# Patient Record
Sex: FEMALE | Race: WHITE | NOT HISPANIC OR LATINO | Employment: UNEMPLOYED | URBAN - METROPOLITAN AREA
[De-identification: names, ages, dates, MRNs, and addresses within clinical notes are randomized per-mention and may not be internally consistent; named-entity substitution may affect disease eponyms.]

---

## 2018-01-10 ENCOUNTER — OFFICE VISIT CONVERTED (OUTPATIENT)
Dept: FAMILY MEDICINE CLINIC | Facility: CLINIC | Age: 33
End: 2018-01-10
Attending: NURSE PRACTITIONER

## 2018-05-09 ENCOUNTER — OFFICE VISIT CONVERTED (OUTPATIENT)
Dept: FAMILY MEDICINE CLINIC | Facility: CLINIC | Age: 33
End: 2018-05-09
Attending: NURSE PRACTITIONER

## 2018-06-05 ENCOUNTER — CONVERSION ENCOUNTER (OUTPATIENT)
Dept: FAMILY MEDICINE CLINIC | Facility: CLINIC | Age: 33
End: 2018-06-05

## 2018-06-05 ENCOUNTER — OFFICE VISIT CONVERTED (OUTPATIENT)
Dept: FAMILY MEDICINE CLINIC | Facility: CLINIC | Age: 33
End: 2018-06-05
Attending: NURSE PRACTITIONER

## 2018-08-23 ENCOUNTER — OFFICE VISIT CONVERTED (OUTPATIENT)
Dept: PODIATRY | Facility: CLINIC | Age: 33
End: 2018-08-23
Attending: PODIATRIST

## 2018-09-19 ENCOUNTER — OFFICE VISIT CONVERTED (OUTPATIENT)
Dept: PODIATRY | Facility: CLINIC | Age: 33
End: 2018-09-19
Attending: PODIATRIST

## 2018-12-04 ENCOUNTER — OFFICE VISIT CONVERTED (OUTPATIENT)
Dept: FAMILY MEDICINE CLINIC | Facility: CLINIC | Age: 33
End: 2018-12-04
Attending: NURSE PRACTITIONER

## 2018-12-21 ENCOUNTER — OFFICE VISIT CONVERTED (OUTPATIENT)
Dept: NEUROSURGERY | Facility: CLINIC | Age: 33
End: 2018-12-21
Attending: PHYSICIAN ASSISTANT

## 2019-01-07 ENCOUNTER — HOSPITAL ENCOUNTER (OUTPATIENT)
Dept: OTHER | Facility: HOSPITAL | Age: 34
Discharge: HOME | End: 2019-01-07
Attending: INTERNAL MEDICINE

## 2019-01-10 ENCOUNTER — OFFICE VISIT CONVERTED (OUTPATIENT)
Dept: FAMILY MEDICINE CLINIC | Facility: CLINIC | Age: 34
End: 2019-01-10
Attending: FAMILY MEDICINE

## 2019-01-12 LAB
CONV HLA-B27 GENOTYPING (PCR): NEGATIVE
CONV HLA-B27 PCR SPECIMEN: NORMAL

## 2019-01-29 ENCOUNTER — OFFICE VISIT CONVERTED (OUTPATIENT)
Dept: NEUROSURGERY | Facility: CLINIC | Age: 34
End: 2019-01-29
Attending: PHYSICIAN ASSISTANT

## 2019-07-16 ENCOUNTER — OFFICE VISIT CONVERTED (OUTPATIENT)
Dept: FAMILY MEDICINE CLINIC | Facility: CLINIC | Age: 34
End: 2019-07-16
Attending: NURSE PRACTITIONER

## 2019-08-09 ENCOUNTER — OFFICE VISIT CONVERTED (OUTPATIENT)
Dept: FAMILY MEDICINE CLINIC | Facility: CLINIC | Age: 34
End: 2019-08-09
Attending: NURSE PRACTITIONER

## 2019-09-13 ENCOUNTER — HOSPITAL ENCOUNTER (OUTPATIENT)
Dept: OTHER | Facility: HOSPITAL | Age: 34
Discharge: HOME | End: 2019-09-13
Attending: INTERNAL MEDICINE

## 2019-09-13 LAB
ALBUMIN SERPL-MCNC: 4.3 G/DL (ref 3.5–5)
ALBUMIN/GLOB SERPL: 1.4 {RATIO} (ref 1.4–2.6)
ALP SERPL-CCNC: 98 U/L (ref 42–98)
ALT SERPL-CCNC: 15 U/L (ref 10–40)
ANION GAP SERPL CALC-SCNC: 24 MMOL/L (ref 8–19)
AST SERPL-CCNC: 16 U/L (ref 15–50)
BASOPHILS # BLD AUTO: 0.06 10*3/UL (ref 0–0.2)
BASOPHILS NFR BLD AUTO: 0.7 % (ref 0–3)
BILIRUB SERPL-MCNC: 0.41 MG/DL (ref 0.2–1.3)
BUN SERPL-MCNC: 8 MG/DL (ref 5–25)
BUN/CREAT SERPL: 12 {RATIO} (ref 6–20)
CALCIUM SERPL-MCNC: 9.5 MG/DL (ref 8.7–10.4)
CHLORIDE SERPL-SCNC: 101 MMOL/L (ref 99–111)
CONV ABS IMM GRAN: 0.01 10*3/UL (ref 0–0.2)
CONV CO2: 19 MMOL/L (ref 22–32)
CONV IMMATURE GRAN: 0.1 % (ref 0–1.8)
CONV TOTAL PROTEIN: 7.3 G/DL (ref 6.3–8.2)
CREAT UR-MCNC: 0.69 MG/DL (ref 0.5–0.9)
DEPRECATED RDW RBC AUTO: 47.1 FL (ref 36.4–46.3)
EOSINOPHIL # BLD AUTO: 0.22 10*3/UL (ref 0–0.7)
EOSINOPHIL # BLD AUTO: 2.4 % (ref 0–7)
ERYTHROCYTE [DISTWIDTH] IN BLOOD BY AUTOMATED COUNT: 14.1 % (ref 11.7–14.4)
ERYTHROCYTE [SEDIMENTATION RATE] IN BLOOD: 13 MM/H (ref 0–20)
GFR SERPLBLD BASED ON 1.73 SQ M-ARVRAT: >60 ML/MIN/{1.73_M2}
GLOBULIN UR ELPH-MCNC: 3 G/DL (ref 2–3.5)
GLUCOSE SERPL-MCNC: 98 MG/DL (ref 65–99)
HCT VFR BLD AUTO: 45.3 % (ref 37–47)
HGB BLD-MCNC: 14.8 G/DL (ref 12–16)
LYMPHOCYTES # BLD AUTO: 2.56 10*3/UL (ref 1–5)
LYMPHOCYTES NFR BLD AUTO: 27.8 % (ref 20–45)
MCH RBC QN AUTO: 29.8 PG (ref 27–31)
MCHC RBC AUTO-ENTMCNC: 32.7 G/DL (ref 33–37)
MCV RBC AUTO: 91.3 FL (ref 81–99)
MONOCYTES # BLD AUTO: 0.7 10*3/UL (ref 0.2–1.2)
MONOCYTES NFR BLD AUTO: 7.6 % (ref 3–10)
NEUTROPHILS # BLD AUTO: 5.67 10*3/UL (ref 2–8)
NEUTROPHILS NFR BLD AUTO: 61.4 % (ref 30–85)
NRBC CBCN: 0 % (ref 0–0.7)
OSMOLALITY SERPL CALC.SUM OF ELEC: 288 MOSM/KG (ref 273–304)
PLATELET # BLD AUTO: 285 10*3/UL (ref 130–400)
PMV BLD AUTO: 10.9 FL (ref 9.4–12.3)
POTASSIUM SERPL-SCNC: 4 MMOL/L (ref 3.5–5.3)
RBC # BLD AUTO: 4.96 10*6/UL (ref 4.2–5.4)
SODIUM SERPL-SCNC: 140 MMOL/L (ref 135–147)
WBC # BLD AUTO: 9.22 10*3/UL (ref 4.8–10.8)

## 2019-11-27 ENCOUNTER — OFFICE VISIT CONVERTED (OUTPATIENT)
Dept: FAMILY MEDICINE CLINIC | Facility: CLINIC | Age: 34
End: 2019-11-27
Attending: NURSE PRACTITIONER

## 2020-02-17 ENCOUNTER — OFFICE VISIT CONVERTED (OUTPATIENT)
Dept: FAMILY MEDICINE CLINIC | Facility: CLINIC | Age: 35
End: 2020-02-17
Attending: NURSE PRACTITIONER

## 2020-03-13 ENCOUNTER — HOSPITAL ENCOUNTER (OUTPATIENT)
Dept: OTHER | Facility: HOSPITAL | Age: 35
Discharge: HOME | End: 2020-03-13

## 2020-11-25 ENCOUNTER — CONVERSION ENCOUNTER (OUTPATIENT)
Dept: FAMILY MEDICINE CLINIC | Facility: CLINIC | Age: 35
End: 2020-11-25

## 2020-11-25 ENCOUNTER — OFFICE VISIT CONVERTED (OUTPATIENT)
Dept: FAMILY MEDICINE CLINIC | Facility: CLINIC | Age: 35
End: 2020-11-25
Attending: NURSE PRACTITIONER

## 2020-11-25 ENCOUNTER — HOSPITAL ENCOUNTER (OUTPATIENT)
Dept: FAMILY MEDICINE CLINIC | Facility: CLINIC | Age: 35
Discharge: HOME | End: 2020-11-25
Attending: NURSE PRACTITIONER

## 2020-11-29 LAB — SARS-COV-2 RNA SPEC QL NAA+PROBE: NOT DETECTED

## 2021-03-02 ENCOUNTER — OFFICE VISIT CONVERTED (OUTPATIENT)
Dept: FAMILY MEDICINE CLINIC | Facility: CLINIC | Age: 36
End: 2021-03-02
Attending: NURSE PRACTITIONER

## 2021-05-07 NOTE — PROGRESS NOTES
Progress Note      Patient Name: Crystal Mast   Patient ID: [id number]   Sex: Female   YOB: 1985        Visit Date: May 9, 2018    Provider: PHYLLIS Rodrigez   Location: [facility]   Location Address: [address] [place] [address]  [id number]   Location Phone: [phone]          Chief Complaint     frequent headaches, pain all over and progressivly getting worse       History Of Present Illness  Crystal Mast is a 33 year old /White female who presents for evaluation and treatment of:      frequent headaches last week with neck pain, shoulder pain, knee pain - pt states she hurts daily and the more active she is the more she hurts - has been taking ibuprofen with mild relief of some of the pains -   Headaches for the last few months - pain is worse to the back of the head and neck and will use a heating pad and will prop her neck up.  Some days feels like she cannot move.   Pt states hx of 18 surgeries in the past 10 years.  Hx of chronic back pain.   Breast surgery 10 years ago.   Swelling of the right ankle.     Denies hx of autoimmune disorder    states she does not sleep well - tosses and turns frequently because she is so achy    Surgeon has started her on Meloxicam 7.5mg once daily - thinks rotator problem and is starting therapy soon         Past Medical History  Disease Name Date Onset Notes   **No Past Medical History --  --          Past Surgical History  Procedure Name Date Notes   Breast reconstruction with latissimus dorsi myocutaneous flap --  --    Breast Reduction --  --    Ceasarian section --  3 or more   Tubal ligation --  --          Medication List  Name Date Started Instructions   sucralfate 1 gram oral tablet  take tablet 4 times daily         Allergy List  Allergen Name Date Reaction Notes   NO KNOWN DRUG ALLERGIES --  --  --          Family Medical History  Disease Name Relative/Age Notes   *No Significant Family Medical History /  "--    Hypertension / --          Social History  Finding Status Start/Stop Quantity Notes   Alcohol Never --/-- --  never drinks alcohol   Exercises regularly --  --/-- --  occasionally   Recreational Drug Use Never --/-- --  never used   Second hand smoke exposure Unknown --/-- --  no   Tobacco Current every day --/-- 0.5 PPD current every day smoker, smokes less than 1 pack per day, for 15 years, never uses other tobacco products   Uses seatbelts --  --/-- --  yes         Review of Systems  · Constitutional  o Admits  o : headache, body aches  o Denies  o : fever, chills  · Cardiovascular  o Admits  o : chest discomfort - hx of breast surgeries and reduction and right breast infection - will flare with frequent use of the right arm  o Denies  o : chest pain, palpitations  · Neurologic  o Admits  o : headaches  o Denies  o : dizziness  · Musculoskeletal  o * See HPI      Vitals  Date Time BP Position Site L\R Cuff Size HR RR TEMP(F) WT  HT  BMI kg/m2 BSA m2 O2 Sat        05/09/2018 09:34 /80 Sitting    77 - R  96.8 174lbs 0oz 5'  4\" 29.87 1.89 96 %           Physical Examination  · Constitutional  o Appearance  o : well developed, well-nourished, in no acute distress  · Eyes  o Conjunctivae  o : conjunctivae normal  o Pupils and Irises  o : pupils equal and round, pupils reactive to light bilaterally  · Neck  o Inspection/Palpation  o : supple  o Thyroid  o : no thyromegaly  · Respiratory  o Respiratory Effort  o : breathing unlabored  o Auscultation of Lungs  o : clear to ascultation  · Cardiovascular  o Heart  o :   § Auscultation of Heart  § : regular rate and rhythm  o Peripheral Vascular System  o :   § Extremities  § : no edema  · Lymphatic  o Neck  o : no lymphadenopathy present  · Musculoskeletal  o General  o :   § General Musculoskeletal  § : Muscle tone, strength, and development grossly normal. No joint swelling noted. increased muscle tension of bilateral trapezius muscles.  · Skin and " Subcutaneous Tissue  o General Inspection  o : NL tone  · Neurologic  o Gait and Station  o :   § Gait Screening  § : normal gait  · Psychiatric  o Mood and Affect  o : mood normal, affect appropriate              Assessment  · Headache     784.0/R51  · Polyarthralgia     719.49/M25.50      Plan  · Orders  o Sed rate (19419) - 719.49/M25.50 - 05/09/2018  o CRP (75001) - 719.49/M25.50 - 05/09/2018  o CBC with Auto Diff H (24098) - 719.49/M25.50 - 05/09/2018  o CMP [facility] (23715) - 719.49/M25.50 - 05/09/2018  o ACO-39: Current medications updated and reviewed () - - 05/09/2018  o Arthritis Panel (81422, 85215, 28310, 97356, 79617, 04364) - 719.49/M25.50 - 05/09/2018  o RHEUMATOLOGY CONSULTATION (RHEUM) - 719.49/M25.50 - 05/09/2018  · Medications  o metaxalone 800 mg oral tablet   SIG: take 1 tablet (800 mg) by oral route 3 times per day as needed for 15 days   DISP: (45) tablets with 0 refills  Prescribed on 05/09/2018     o meloxicam 15 mg oral tablet   SIG: take 1 tablet (15 mg) by oral route once daily for 30 days   DISP: (30) tablets with 2 refills  Prescribed on 05/09/2018     · Instructions  o Take all medications as prescribed/directed.  o Patient was educated/instructed on their diagnosis, treatment and medications prior to discharge from the clinic today.  o Increase Mobic to 15mg daily - start muscle relaxer tid prn - use caution, may cause drowsiness  o Follow up in 2 weeks with no improvement - will consider starting Cymbalta with no improvement in 2 weeks for the diffuse pain.  o Symptoms concerning for fibromyalgia - will r/o other causes.   · Disposition  o Call or Return if symptoms worsen or persist.            Electronically Signed by: PHYLLIS Rodrigez -Author on May 9, 2018 10:17:18 AM

## 2021-05-07 NOTE — PROGRESS NOTES
Progress Note      Patient Name: Crystal Mast   Patient ID: [id number]   Sex: Female   YOB: 1985    Referring Provider: Karin FERGUSON    Visit Date: November 27, 2019    Provider: PHYLLIS Rodrigez   Location: [facility]   Location Address: [address] [place] [address]   [place] Boss  [id number]   Location Phone: [phone]          Chief Complaint     wants to increase or change Prozac       History Of Present Illness  Crystal Mast is a 34 year old /White female who presents for evaluation and treatment of:      Depression: Prozac is not longer working as well - she has been crying regularly again and states she has no desire to get out and do anything over the past 1-2 months - getting easily agitated   Denies SI       Past Medical History  Disease Name Date Onset Notes   Anxiety and depression --  --    Breast infection --  --    Insomnia --  --          Past Surgical History  Procedure Name Date Notes   Breast reconstruction with latissimus dorsi myocutaneous flap --  --    Breast Reduction --  --    Ceasarian section --  3 or more   Tubal ligation --  --          Medication List  Name Date Started Instructions   amitriptyline 25 mg oral tablet 10/04/2019 TAKE ONE TABLET BY MOUTH EVERY NIGHT AT BEDTIME   Cymbalta 60 mg oral capsule,delayed release(/EC)  take 1 capsule (60 mg) by oral route once daily   fluoxetine 40 mg oral capsule 08/19/2019 take 1 capsule (40 mg) by oral route once daily in the evening for 30 days   meloxicam 7.5 mg oral tablet  take 1 tablet (7.5 mg) by oral route once daily         Allergy List  Allergen Name Date Reaction Notes   NO KNOWN DRUG ALLERGIES --  --  --          Family Medical History  Disease Name Relative/Age Notes   Hypertension  --    *No Significant Family Medical History  --          Social History  Finding Status Start/Stop Quantity Notes   Alcohol Never --/-- --  never drinks alcohol   Exercises regularly --  --/-- --   occasionally   Recreational Drug Use Never --/-- --  never used   Second hand smoke exposure Unknown --/-- --  no   Tobacco Current every day --/-- 0.5 PPD current every day smoker, smokes less than 1 pack per day, for 15 years, never uses other tobacco products   Uses seatbelts --  --/-- --  yes         Immunizations  NameDate Admin Mfg Trade Name Lot Number Route Inj VIS Given VIS Publication   Tdap09/01/2015 NE Not Entered  NE NE 07/29/2019    Comments:          Review of Systems  · Constitutional  o Denies  o : fever, chills  · Cardiovascular  o Denies  o : chest pain, palpitations  · Respiratory  o Denies  o : shortness of breath, wheezing, cough  · Psychiatric  o * See HPI      Vitals  Date Time BP Position Site L\R Cuff Size HR RR TEMP (F) WT  HT  BMI kg/m2 BSA m2 O2 Sat HC       11/27/2019 02:54 /80 Sitting    91 - R   173lbs 0oz    94 %          Physical Examination  · Constitutional  o Appearance  o : well developed, well-nourished, in no acute distress  · Eyes  o Conjunctivae  o : conjunctivae normal  o Pupils and Irises  o : pupils equal and round, pupils reactive to light bilaterally  · Neck  o Inspection/Palpation  o : supple  o Thyroid  o : no thyromegaly  · Respiratory  o Respiratory Effort  o : breathing unlabored  o Auscultation of Lungs  o : clear to ascultation  · Cardiovascular  o Heart  o :   § Auscultation of Heart  § : regular rate and rhythm  o Peripheral Vascular System  o :   § Extremities  § : no edema  · Lymphatic  o Neck  o : no lymphadenopathy present  · Musculoskeletal  o General  o :   § General Musculoskeletal  § : No joint swelling or deformity. Muscle tone, strength, and development grossly normal.  · Skin and Subcutaneous Tissue  o General Inspection  o : NL tone  · Neurologic  o Gait and Station  o :   § Gait Screening  § : normal gait  · Psychiatric  o Mood and Affect  o : tearful, mood normal, affect appropriate              Assessment  · Anxiety and  depression       Anxiety disorder, unspecified     300.00/F41.9  Major depressive disorder, single episode, unspecified     300.00/F32.9  · Insomnia     780.52/G47.00    Problems Reconciled  Plan  · Orders  o ACO-39: Current medications updated and reviewed () - - 11/27/2019  · Medications  o fluoxetine 60 mg oral tablet   SIG: take 1 tablet by oral route once a day (at bedtime) for 30 days   DISP: (30) tablets with 5 refills  Adjusted on 11/27/2019     o Medications have been Reconciled  o Transition of Care or Provider Policy  · Instructions  o Patient was educated/instructed on their diagnosis, treatment and medications prior to discharge from the clinic today.  o Will increase Prozac - pt to follow up in 2 weeks and if no improvement will change medication for depression  · Disposition  o Return Visit Request in/on 2 weeks +/- 2 days (5426).            Electronically Signed by: PHYLLIS Rodrigez -Author on November 27, 2019 03:16:06 PM
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
## 2021-05-07 NOTE — PROGRESS NOTES
Progress Note      Patient Name: Crystal Mast   Patient ID: [id number]   Sex: Female   YOB: 1985        Visit Date: January 10, 2018    Provider: PHYLLIS Rodrigez   Location: [facility]   Location Address: [address] [place] [address]  [id number]   Location Phone: [phone]          Chief Complaint     Patient here to discuss smoking issues.       History Of Present Illness  Crystal Mast is a 32 year old /White female who presents for evaluation and treatment of:      smoking cessation - pt has been smoking for about 13 years - she has not quit in the past other than a few days at a time - pt states her daughters have asked her to quit - she has tried to quit with patches and cold turkey - where places the patches will have aching -   Pt would like to try Chantix because she did well on it in the past but states she didn't have the right mindset at that time - states she now is ready and wants to quit. Denies any SE with the Chantix in the past. Pt states she did the flexible quit date in the past.       Past Medical History  Disease Name Date Onset Notes   **No Past Medical History --  --          Past Surgical History  Procedure Name Date Notes   Breast reconstruction with latissimus dorsi myocutaneous flap --  --    Breast Reduction --  --    Ceasarian section --  3 or more   Tubal ligation --  --          Medication List  Name Date Started Instructions   omeprazole 40 mg oral capsule,delayed release(DR/EC)  take 1 capsule daily   orphenadrine citrate 100 mg oral tablet extended release  take 1 tablet twice daily   sucralfate 1 gram oral tablet  take tablet 4 times daily         Allergy List  Allergen Name Date Reaction Notes   NO KNOWN DRUG ALLERGIES --  --  --          Family Medical History  Disease Name Relative/Age Notes   *No Significant Family Medical History / --    Hypertension / --          Social History  Finding Status Start/Stop Quantity  "Notes   Alcohol Never --/-- --  never drinks alcohol   Exercises regularly --  --/-- --  occasionally   Recreational Drug Use Never --/-- --  never used   Second hand smoke exposure Unknown --/-- --  no   Tobacco Current every day --/-- 0.5 PPD current every day smoker, smokes less than 1 pack per day, for 15 years, never uses other tobacco products   Uses seatbelts --  --/-- --  yes         Review of Systems  · Constitutional  o Denies  o : fatigue, fever, chills  · Cardiovascular  o Denies  o : chest pain, palpitations  · Gastrointestinal  o Denies  o : nausea, vomiting, diarrhea, constipation  · Neurologic  o Denies  o : seizures  · Psychiatric  o Denies  o : anxiety, depression      Vitals  Date Time BP Position Site L\R Cuff Size HR RR TEMP(F) WT  HT  BMI kg/m2 BSA m2 O2 Sat HC       01/10/2018 10:49 /72 Sitting    104 - R  97.2 169lbs 0oz 5'  6\" 27.28 1.89 98 %           Physical Examination  · Constitutional  o Appearance  o : well developed, well-nourished, in no acute distress  · Eyes  o Conjunctivae  o : conjunctivae normal  · Respiratory  o Respiratory Effort  o : breathing unlabored  o Auscultation of Lungs  o : clear to ascultation  · Cardiovascular  o Heart  o :   § Auscultation of Heart  § : regular rate and rhythm  · Neurologic  o Gait and Station  o :   § Gait Screening  § : normal gait  · Psychiatric  o Mood and Affect  o : mood normal, affect appropriate              Assessment  · Nicotine dependence     305.1/F17.200  · Tobacco abuse counseling       Tobacco abuse counseling     V65.42/Z71.6      Plan  · Orders  o Smoking cessation counseling, 3-10 minutes [facility] ([address]) - 305.1/F17.200 - 01/10/2018  o Smoking cessation counseling, 3-10 minutes [facility] ([address]) - V65.42/Z71.6 - 01/10/2018  o ACO-17: Screened for tobacco use AND received tobacco cessation intervention (4004F) - V65.42/Z71.6 - 01/10/2018  o ACO-39: Current medications updated and reviewed () - - " 01/10/2018  · Medications  o Chantix Starting Month Box 0.5 mg (11)- 1 mg (42) oral tablets,dose pack   SIG: take as directed   DISP: (1) 53 ct dose-pack with 0 refills  Prescribed on 01/10/2018     o Chantix Continuing Month Box 1 mg oral tablet   SIG: take 1 tablet (1 mg) with glass of water by oral route 2 times per day after meals   DISP: (1) Box with 1 refills  Prescribed on 01/10/2018     · Instructions  o *Form of nicotine being used: cigarettes  o Patient was strongly encouraged to discontinue use of any nicotine containing product or minimize the use of the product.  o Discussed smoking cessation and counseling with patient for over 3 minutes.  o Tobacco and smoking cessation counseling for more than 3 minutes was completed.  o Take all medications as prescribed/directed.  o Patient was educated/instructed on their diagnosis, treatment and medications prior to discharge from the clinic today.  o Start Chantix - pt given handout on proper use.   · Disposition  o Return Visit Request in/on 1 month +/- 2 days (7169).            Electronically Signed by: PHYLLIS Rodrigez -Author on January 10, 2018 11:19:16 AM
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
## 2021-05-07 NOTE — PROGRESS NOTES
Progress Note      Patient Name: Crystal Mast   Patient ID: [id number]   Sex: Female   YOB: 1985    Referring Provider: Karin FERGUSON    Visit Date: March 2, 2021    Provider: PHYLLIS Rodrigez   Location: [facility]   Location Address: [address] [place] [address]   [place]  [id number]   Location Phone: [phone]          Chief Complaint     wants to start Cymbalta       History Of Present Illness  Crystal Mast is a 35 year old /White female who presents for evaluation and treatment of:      here with depression and anxiety - pt states she was previously on Cymbalta and Prozac but was not following up with appointments during the pandemic as her children were home with her and hard for her to make appointments and when she went to Rheumatology and he would not restart Cymbalta - she was on Cymbalta for depression, anxiety, and pain related to Rheumatoid Arthritis - she has had a recent death of her sister and still not over the death of her mother and her  has been having a lot of anxiety so he recently started medication - her dog of 10 years passed away - her nephew is in the hospital - her dad is having problems with his diabetes - her mother's dog passed away also - she has been staying at home schooling her children and states she has been crying a lot recently - she has spoken to a counselor 3x, but they never called her - she feels like she is being unfair to her children and , secludes herself from her children but will snap at her  - denies SI       Past Medical History  Disease Name Date Onset Notes   Anxiety and depression --  --    Breast infection --  --    Insomnia --  --          Past Surgical History  Procedure Name Date Notes   Breast reconstruction with latissimus dorsi myocutaneous flap --  --    Breast Reduction --  --    Ceasarian section --  3 or more   Tubal ligation --  --          Medication List  Name Date Started  "Instructions   amitriptyline 25 mg oral tablet 02/17/2020 TAKE ONE TABLET BY MOUTH EVERY NIGHT AT BEDTIME   fluoxetine 20 mg oral capsule 03/02/2021 take 3 capsules (60 mg) by oral route once daily in the morning for 30 days   meloxicam 7.5 mg oral tablet  take 1 tablet (7.5 mg) by oral route once daily         Allergy List  Allergen Name Date Reaction Notes   NO KNOWN DRUG ALLERGIES --  --  --          Family Medical History  Disease Name Relative/Age Notes   Hypertension  --    *No Significant Family Medical History  --          Social History  Finding Status Start/Stop Quantity Notes   Alcohol Never --/-- --  never drinks alcohol   Exercises regularly --  --/-- --  occasionally   Recreational Drug Use Never --/-- --  never used   Second hand smoke exposure Unknown --/-- --  no   Tobacco Current every day --/-- 0.5 PPD current every day smoker, smokes less than 1 pack per day, for 15 years, never uses other tobacco products   Uses seatbelts --  --/-- --  yes         Immunizations  NameDate Admin Mfg Trade Name Lot Number Route Inj VIS Given VIS Publication   Tdap09/01/2015 NE Not Entered  NE NE 07/29/2019    Comments:          Review of Systems  · Musculoskeletal  o Admits  o : joint pain      Vitals  Date Time BP Position Site L\R Cuff Size HR RR TEMP (F) WT  HT  BMI kg/m2 BSA m2 O2 Sat FR L/min FiO2 HC       03/02/2021 09:53 /88 Sitting    79 - R  96.9 165lbs 6oz 5'  4\" 28.39 1.84 98 %  21%          Physical Examination  · Constitutional  o Appearance  o : well developed, well-nourished, in no acute distress  · Eyes  o Conjunctivae  o : conjunctivae normal  · Neck  o Inspection/Palpation  o : supple  o Thyroid  o : no thyromegaly  · Respiratory  o Respiratory Effort  o : breathing unlabored  o Auscultation of Lungs  o : clear to ascultation  · Cardiovascular  o Heart  o :   § Auscultation of Heart  § : regular rate and rhythm  o Peripheral Vascular System  o :   § Extremities  § : no " edema  · Lymphatic  o Neck  o : no lymphadenopathy present  · Musculoskeletal  o General  o :   § General Musculoskeletal  § : No joint swelling or deformity. Muscle tone, strength, and development grossly normal.  · Skin and Subcutaneous Tissue  o General Inspection  o : no lesions present, no areas of discoloration, skin turgor normal, texture normal  · Neurologic  o Gait and Station  o :   § Gait Screening  § : normal gait  · Psychiatric  o Mood and Affect  o : mood normal, affect appropriate          Assessment  · Anxiety disorder     300.00/F41.9  · Anxiety and depression       Anxiety disorder, unspecified     300.00/F41.9  Major depressive disorder, single episode, unspecified     300.00/F32.9  · Rheumatoid arthritis     714.0/M06.9      Plan  · Orders  o ACO-17: Screened for tobacco use AND received tobacco cessation intervention (4004F) - - 03/02/2021  o ACO-39: Current medications updated and reviewed (1159F, ) - - 03/02/2021  · Medications  o Cymbalta 30 mg oral capsule,delayed release(DR/EC)   SIG: take 1 capsule (30 mg) by oral route once daily for 30 days   DISP: (30) Capsule with 0 refills  Prescribed on 03/02/2021     o buspirone 5 mg oral tablet   SIG: take 1 tablet by oral route 3 times a day as needed for 30 days   DISP: (90) Tablet with 0 refills  Prescribed on 03/02/2021     o fluoxetine 20 mg oral capsule   SIG: take 3 capsules (60 mg) by oral route once daily in the morning for 30 days   DISP: (90) Capsule with 2 refills  Adjusted on 03/02/2021     o Medications have been Reconciled  o Transition of Care or Provider Policy  · Instructions  o Discussed the need for therapy, either with a certified counselor, psychologist, and/or family . If no improvement is noted or worsening of their condition, return to office or ER. But also discussed with patient that if they are non-responsive to the type of medication they may need to see a psychaitrist for further evaluation and management.    o Patient was given an SSRI/SSNRI medication and warned of possible side effects of the medication including potential for increase risk of sucicidal thoughts and feelings. Patient was instructed that if they begin to exhibit any of these effects they will discontinue the medication immediately and contact our office or the ER ASAP.   o Handouts were given to patient: Psychiatry numbers  o Take all medications as prescribed/directed.  o Patient was educated/instructed on their diagnosis, treatment and medications prior to discharge from the clinic today.  o Restart on Cymbalta - pt to follow up in 2-4 weeks - pt to call and schedule with Psychiatry.  o Electronically Identified Patient Education Materials Provided Electronically  · Disposition  o Return Visit Request in/on 2 weeks +/- 2 weeks (8225).            Electronically Signed by: PHYLLIS Rodrigez -Author on March 2, 2021 12:54:20 PM

## 2021-05-07 NOTE — PROGRESS NOTES
Progress Note      Patient Name: Crystal Mast   Patient ID: [id number]   Sex: Female   YOB: 1985    Referring Provider: Karin FERGUSON    Visit Date: December 4, 2018    Provider: PHYLLIS Rodrigez   Location: [facility]   Location Address: [address] [place] [address]  [id number]   Location Phone: [phone]          Chief Complaint     sore throat, nausea, headaches, and low grade fever, all 3 children have strep       History Of Present Illness  Crystal Mast is a 33 year old /White female who presents for evaluation and treatment of:      sore throat, nausea, headaches with low grade fever, upset stomach, SOB, chest congestion and upper back discomfort x 3 days ago - chills    her 3 children are positive for strep last week    Pt went to Rheumatology and her Cymbalta was increased in 60mg daily - he recommended that she be referred to pain management and to a back specialist, chronic low back pain with upper scoliosis - hx of breast reduction due to pain - uses a tens unit - hx of D&C and ablation due to bleeding and pain - prolonged sitting or standing exacerbates pain - pt states hx of x-ray in [facility] system       Past Medical History  Disease Name Date Onset Notes   **No Past Medical History --  --          Past Surgical History  Procedure Name Date Notes   Breast reconstruction with latissimus dorsi myocutaneous flap --  --    Breast Reduction --  --    Ceasarian section --  3 or more   Tubal ligation --  --          Medication List  Name Date Started Instructions   Cymbalta 60 mg oral capsule,delayed release(DR/EC)  take 1 capsule (60 mg) by oral route once daily   sucralfate 1 gram oral tablet  take tablet 4 times daily         Allergy List  Allergen Name Date Reaction Notes   NO KNOWN DRUG ALLERGIES --  --  --          Family Medical History  Disease Name Relative/Age Notes   *No Significant Family Medical History / --    Hypertension / --   "        Social History  Finding Status Start/Stop Quantity Notes   Alcohol Never --/-- --  never drinks alcohol   Exercises regularly --  --/-- --  occasionally   Recreational Drug Use Never --/-- --  never used   Second hand smoke exposure Unknown --/-- --  no   Tobacco Current every day --/-- 0.5 PPD current every day smoker, smokes less than 1 pack per day, for 15 years, never uses other tobacco products   Uses seatbelts --  --/-- --  yes         Review of Systems  · Constitutional  o Admits  o : fever, headache, chills, body aches  · HENT  o Denies  o : nasal congestion, nasal discharge  · Respiratory  o Admits  o : wheezing, cough  · Gastrointestinal  o Admits  o : nausea, abdominal pain  · Musculoskeletal  o * See HPI      Vitals  Date Time BP Position Site L\R Cuff Size HR RR TEMP(F) WT  HT  BMI kg/m2 BSA m2 O2 Sat HC       12/04/2018 05:10 /70 Sitting    76 - R  97 177lbs 8oz 5'  4\" 30.47 1.91 98 %           Physical Examination  · Constitutional  o Appearance  o : well developed, well-nourished, in no acute distress  · Eyes  o Conjunctivae  o : conjunctivae normal  o Pupils and Irises  o : pupils equal and round, pupils reactive to light bilaterally  · Neck  o Inspection/Palpation  o : supple  o Thyroid  o : no thyromegaly  · Respiratory  o Respiratory Effort  o : breathing unlabored  o Auscultation of Lungs  o : clear to ascultation  · Cardiovascular  o Heart  o :   § Auscultation of Heart  § : regular rate and rhythm  o Peripheral Vascular System  o :   § Extremities  § : no edema  · Lymphatic  o Neck  o : no lymphadenopathy present  · Musculoskeletal  o General  o :   § General Musculoskeletal  § : No joint swelling or deformity. Muscle tone, strength, and development grossly normal.  · Skin and Subcutaneous Tissue  o General Inspection  o : NL tone  · Neurologic  o Gait and Station  o :   § Gait Screening  § : normal gait  · Psychiatric  o Mood and Affect  o : mood normal, affect " appropriate              Assessment  · Upper respiratory infection     465.9/J06.9  · Low back pain     724.2/M54.5  · Exposure to Streptococcal pharyngitis     V01.89/Z20.818      Plan  · Orders  o IOP - Rapid Strep (24023) - - 12/04/2018   Negative  o ACO-17: Screened for tobacco use AND received tobacco cessation intervention (4004F) - - 12/04/2018  o ACO-39: Current medications updated and reviewed () - - 12/04/2018  o NEUROLOGY CONSULTATION. (NEURO) - 724.2/M54.5 - 12/04/2018   Chronic low back pain hx of fibromyalgia  · Medications  o amoxicillin 875 mg oral tablet   SIG: take 1 tablet (875 mg) by oral route every 12 hours for 10 days   DISP: (20) tablets with 0 refills  Prescribed on 12/04/2018     · Instructions  o Take all medications as prescribed/directed.  o Patient was educated/instructed on their diagnosis, treatment and medications prior to discharge from the clinic today.  o Chronic conditions reviewed and taken into consideration for today's treatment plan.  · Disposition  o Follow up as needed.            Electronically Signed by: PHYLLIS Rodrigez -Author on December 4, 2018 05:39:18 PM

## 2021-05-07 NOTE — PROGRESS NOTES
Progress Note      Patient Name: Crystal Mast   Patient ID: [id number]   Sex: Female   YOB: 1985    Referring Provider: Karin FERGUSON    Visit Date: August 9, 2019    Provider: PHYLLIS Rodrigez   Location: [facility]   Location Address: [address] [place] [address]   [place]  [id number]   Location Phone: [phone]          Chief Complaint     follow up to medications       History Of Present Illness  Crystal Mast is a 34 year old /White female who presents for evaluation and treatment of:      follow up on new start of medication - states her mood is more stable - she was able to cook dinner for a week and clean some - she has some weeks that are good and some that are bad - still cries a lot - she is having to get out of the house more and she was able to take her children to the fair this year    no improvement with the use of Trazodone with sleep - lies in bed awake and then wakes every couple hours and sometimes is able to fall back asleep and other times she cannot fall asleep - states she lies back down after  and kids leave for work and school - makes her cranky and irritable       Past Medical History  Disease Name Date Onset Notes   Anxiety and depression --  --    Breast infection --  --    Insomnia --  --          Past Surgical History  Procedure Name Date Notes   Breast reconstruction with latissimus dorsi myocutaneous flap --  --    Breast Reduction --  --    Ceasarian section --  3 or more   Tubal ligation --  --          Medication List  Name Date Started Instructions   Cymbalta 60 mg oral capsule,delayed release(DR/EC)  take 1 capsule (60 mg) by oral route once daily   meloxicam 7.5 mg oral tablet  take 1 tablet (7.5 mg) by oral route once daily         Allergy List  Allergen Name Date Reaction Notes   NO KNOWN DRUG ALLERGIES --  --  --          Family Medical History  Disease Name Relative/Age Notes   Hypertension  --    *No Significant  "Family Medical History  --          Social History  Finding Status Start/Stop Quantity Notes   Alcohol Never --/-- --  never drinks alcohol   Exercises regularly --  --/-- --  occasionally   Recreational Drug Use Never --/-- --  never used   Second hand smoke exposure Unknown --/-- --  no   Tobacco Current every day --/-- 0.5 PPD current every day smoker, smokes less than 1 pack per day, for 15 years, never uses other tobacco products   Uses seatbelts --  --/-- --  yes         Immunizations  NameDate Admin Mfg Trade Name Lot Number Route Inj VIS Given VIS Publication   Tdap09/01/2015 NE Not Entered  NE NE 07/29/2019    Comments:          Review of Systems  · Constitutional  o Admits  o : fatigue  · Cardiovascular  o Denies  o : chest pain, palpitations  · Respiratory  o Denies  o : shortness of breath  · Psychiatric  o * See HPI      Vitals  Date Time BP Position Site L\R Cuff Size HR RR TEMP (F) WT  HT  BMI kg/m2 BSA m2 O2 Sat        08/09/2019 11:11 /68 Sitting    93 - R  97.3 179lbs 4oz 5'  4\" 30.77 1.92 96 %          Physical Examination  · Constitutional  o Appearance  o : well developed, well-nourished, in no acute distress  · Eyes  o Conjunctivae  o : conjunctivae normal  o Pupils and Irises  o : pupils equal and round, pupils reactive to light bilaterally  · Neck  o Inspection/Palpation  o : supple  o Thyroid  o : no thyromegaly  · Respiratory  o Respiratory Effort  o : breathing unlabored  o Auscultation of Lungs  o : clear to ascultation  · Cardiovascular  o Heart  o :   § Auscultation of Heart  § : regular rate and rhythm  o Peripheral Vascular System  o :   § Extremities  § : no edema  · Lymphatic  o Neck  o : no lymphadenopathy present  · Musculoskeletal  o General  o :   § General Musculoskeletal  § : No joint swelling or deformity. Muscle tone, strength, and development grossly normal.  · Skin and Subcutaneous Tissue  o General Inspection  o : NL tone  · Neurologic  o Gait and Station  o : "   § Gait Screening  § : normal gait  · Psychiatric  o Mood and Affect  o : mood normal, affect appropriate              Assessment  · Insomnia, unspecified     780.52/G47.00  · Major depressive disorder     296.20/F32.2  · Anxiety and depression       Anxiety disorder, unspecified     300.00/F41.9  Major depressive disorder, single episode, unspecified     300.00/F32.9      Plan  · Orders  o ACO-17: Screened for tobacco use AND received tobacco cessation intervention (4004F) - - 08/09/2019  o ACO-39: Current medications updated and reviewed () - - 08/09/2019  · Medications  o amitriptyline 25 mg oral tablet   SIG: take 1 tablet (25 mg) by oral route once daily at bedtime for 30 days   DISP: (30) tablets with 1 refills  Prescribed on 08/09/2019     o fluoxetine 20 mg oral tablet   SIG: take 1.5 tablets by oral route daily for 30 days   DISP: (45) tablets with 2 refills  Prescribed on 08/09/2019     o Prozac 10 mg oral capsule   SIG: take 1 capsule by oral route QD for 7 days then increase to 2 capsules daily   DISP: (60) capsules with 0 refills  Discontinued on 08/09/2019     o trazodone 50 mg oral tablet   SIG: take 1 tablet (50 mg) by oral route once daily at bedtime for 30 days   DISP: (30) tablets with 0 refills  Discontinued on 08/09/2019     · Instructions  o The patient and I discussed the need for therapy, either with a certified counselor, psychologist, and/or family . If no improvement is noted or worsening of their condition, return to office or ER. But also discussed with patient that if they are non-responsive to the type of medication they may need to see a psychaitrist for further evaluation and management.   o Take all medications as prescribed/directed.  o Patient was educated/instructed on their diagnosis, treatment and medications prior to discharge from the clinic today.  · Disposition  o Follow up as needed.            Electronically Signed by: PHYLLIS Rodrigez -Author on August  13, 2019 09:31:07 PM

## 2021-05-07 NOTE — PROGRESS NOTES
Progress Note      Patient Name: Crystal Mast   Patient ID: [id number]   Sex: Female   YOB: 1985    Referring Provider: Karin FERGUSON    Visit Date: February 17, 2020    Provider: PHYLLIS Rodrigez   Location: [facility]   Location Address: [address] [place] [address]   [place]  [id number]   Location Phone: [phone]          Chief Complaint     follow up to Prozac and referral for Pain Management       History Of Present Illness  Crystal Mast is a 34 year old /White female who presents for evaluation and treatment of:      follow up on Anxiety and depression: she has been taking Prozac and she feels like she is doing better - her  has noticed an improvement in her mood and involvement with the family - she is cooking dinner again - when has down days will return to baseline quicker than previously - she is having more good days than bad days     Pain Management referral for back pain - she was seen by Spine in [facility] and they wanted to do spinal injections but pt does not want to do injections and would like to explore other options for pain management - pain is from the mid-lower back and will go down to the hips and buttocks and stays achy - pt has a hx of fibromyalgia and is established with Rheumatology       Past Medical History  Disease Name Date Onset Notes   Anxiety and depression --  --    Breast infection --  --    Insomnia --  --          Past Surgical History  Procedure Name Date Notes   Breast reconstruction with latissimus dorsi myocutaneous flap --  --    Breast Reduction --  --    Ceasarian section --  3 or more   Tubal ligation --  --          Medication List  Name Date Started Instructions   amitriptyline 25 mg oral tablet 01/27/2020 TAKE ONE TABLET BY MOUTH EVERY NIGHT AT BEDTIME   Cymbalta 60 mg oral capsule,delayed release(DR/EC)  take 1 capsule (60 mg) by oral route once daily   fluoxetine 20 mg oral capsule 12/11/2019 take 3  "capsules (60 mg) by oral route once daily in the morning for 30 days   meloxicam 7.5 mg oral tablet  take 1 tablet (7.5 mg) by oral route once daily         Allergy List  Allergen Name Date Reaction Notes   NO KNOWN DRUG ALLERGIES --  --  --          Family Medical History  Disease Name Relative/Age Notes   Hypertension  --    *No Significant Family Medical History  --          Social History  Finding Status Start/Stop Quantity Notes   Alcohol Never --/-- --  never drinks alcohol   Exercises regularly --  --/-- --  occasionally   Recreational Drug Use Never --/-- --  never used   Second hand smoke exposure Unknown --/-- --  no   Tobacco Current every day --/-- 0.5 PPD current every day smoker, smokes less than 1 pack per day, for 15 years, never uses other tobacco products   Uses seatbelts --  --/-- --  yes         Immunizations  NameDate Admin Mfg Trade Name Lot Number Route Inj VIS Given VIS Publication   Tdap09/01/2015 NE Not Entered  NE NE 07/29/2019    Comments:          Review of Systems  · Constitutional  o Denies  o : fever, chills  · Cardiovascular  o Denies  o : chest pain, palpitations  · Respiratory  o Denies  o : shortness of breath  · Psychiatric  o * See HPI      Vitals  Date Time BP Position Site L\R Cuff Size HR RR TEMP (F) WT  HT  BMI kg/m2 BSA m2 O2 Sat        02/17/2020 09:38 /80 Sitting    84 - R  97.2 172lbs 2oz 5'  4\" 29.54 1.88 96 %          Physical Examination  · Constitutional  o Appearance  o : well developed, well-nourished, in no acute distress  · Eyes  o Conjunctivae  o : conjunctivae normal  o Pupils and Irises  o : pupils equal and round, pupils reactive to light bilaterally  · Neck  o Inspection/Palpation  o : supple  o Thyroid  o : no thyromegaly  · Respiratory  o Respiratory Effort  o : breathing unlabored  o Auscultation of Lungs  o : clear to ascultation  · Cardiovascular  o Heart  o :   § Auscultation of Heart  § : regular rate and rhythm  o Peripheral Vascular " System  o :   § Extremities  § : no edema  · Lymphatic  o Neck  o : no lymphadenopathy present  · Musculoskeletal  o General  o :   § General Musculoskeletal  § : No joint swelling or deformity. Muscle tone, strength, and development grossly normal.  · Skin and Subcutaneous Tissue  o General Inspection  o : NL tone  · Neurologic  o Gait and Station  o :   § Gait Screening  § : normal gait  · Psychiatric  o Mood and Affect  o : mood normal, affect appropriate              Assessment  · Chronic low back pain       Low back pain     724.2/M54.5  Other chronic pain     724.2/G89.29  · Anxiety and depression       Anxiety disorder, unspecified     300.00/F41.9  Major depressive disorder, single episode, unspecified     300.00/F32.9  · Insomnia     780.52/G47.00    Problems Reconciled  Plan  · Orders  o ACO-14: Influenza immunization was not administered for reasons documented () - - 02/17/2020   Declines and out of stock  o ACO-17: Screened for tobacco use AND received tobacco cessation intervention (4004F) - - 02/17/2020  o ACO-39: Current medications updated and reviewed () - - 02/17/2020  o PAIN MANAGEMENT CONSULTATION (PAINM) - 724.2/M54.5, 724.2/G89.29 - 02/17/2020   [facility]   · Medications  o amitriptyline 25 mg oral tablet   SIG: TAKE ONE TABLET BY MOUTH EVERY NIGHT AT BEDTIME   DISP: (30) Tablet with 2 refills  Adjusted on 02/17/2020     o fluoxetine 20 mg oral capsule   SIG: take 3 capsules (60 mg) by oral route once daily in the morning for 30 days   DISP: (90) capsules with 5 refills  Adjusted on 02/17/2020     o Medications have been Reconciled  o Transition of Care or Provider Policy  · Instructions  o Take all medications as prescribed/directed.  o Patient was educated/instructed on their diagnosis, treatment and medications prior to discharge from the clinic today.  o Chronic conditions reviewed and taken into consideration for today's treatment  plan.  · Disposition  o Follow up as needed.            Electronically Signed by: PHYLLIS Rodrigez -Author on February 17, 2020 10:01:53 AM
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
## 2021-05-09 VITALS
OXYGEN SATURATION: 98 % | BODY MASS INDEX: 29.26 KG/M2 | HEART RATE: 84 BPM | WEIGHT: 171.37 LBS | TEMPERATURE: 97.2 F | SYSTOLIC BLOOD PRESSURE: 110 MMHG | HEIGHT: 64 IN | DIASTOLIC BLOOD PRESSURE: 70 MMHG

## 2021-05-09 VITALS
HEART RATE: 87 BPM | SYSTOLIC BLOOD PRESSURE: 136 MMHG | WEIGHT: 181.25 LBS | DIASTOLIC BLOOD PRESSURE: 86 MMHG | OXYGEN SATURATION: 97 % | BODY MASS INDEX: 30.94 KG/M2 | HEIGHT: 64 IN | TEMPERATURE: 96.8 F

## 2021-05-09 VITALS
DIASTOLIC BLOOD PRESSURE: 68 MMHG | OXYGEN SATURATION: 96 % | TEMPERATURE: 97.3 F | HEIGHT: 64 IN | HEART RATE: 93 BPM | WEIGHT: 179.25 LBS | SYSTOLIC BLOOD PRESSURE: 108 MMHG | BODY MASS INDEX: 30.6 KG/M2

## 2021-05-09 VITALS
DIASTOLIC BLOOD PRESSURE: 80 MMHG | HEIGHT: 64 IN | OXYGEN SATURATION: 96 % | TEMPERATURE: 96.8 F | WEIGHT: 174 LBS | SYSTOLIC BLOOD PRESSURE: 116 MMHG | BODY MASS INDEX: 29.71 KG/M2 | HEART RATE: 77 BPM

## 2021-05-09 VITALS
HEIGHT: 66 IN | BODY MASS INDEX: 27.16 KG/M2 | OXYGEN SATURATION: 98 % | DIASTOLIC BLOOD PRESSURE: 72 MMHG | WEIGHT: 169 LBS | SYSTOLIC BLOOD PRESSURE: 112 MMHG | TEMPERATURE: 97.2 F | HEART RATE: 104 BPM

## 2021-05-09 VITALS — OXYGEN SATURATION: 97 % | HEART RATE: 78 BPM | TEMPERATURE: 97.1 F

## 2021-05-09 VITALS
BODY MASS INDEX: 29.71 KG/M2 | TEMPERATURE: 97.3 F | DIASTOLIC BLOOD PRESSURE: 90 MMHG | OXYGEN SATURATION: 98 % | HEIGHT: 64 IN | SYSTOLIC BLOOD PRESSURE: 120 MMHG | WEIGHT: 174 LBS | HEART RATE: 129 BPM

## 2021-05-09 VITALS
OXYGEN SATURATION: 96 % | SYSTOLIC BLOOD PRESSURE: 116 MMHG | DIASTOLIC BLOOD PRESSURE: 80 MMHG | HEART RATE: 84 BPM | HEIGHT: 64 IN | WEIGHT: 172.12 LBS | TEMPERATURE: 97.2 F | BODY MASS INDEX: 29.38 KG/M2

## 2021-05-09 VITALS
OXYGEN SATURATION: 94 % | WEIGHT: 173 LBS | DIASTOLIC BLOOD PRESSURE: 80 MMHG | HEART RATE: 91 BPM | SYSTOLIC BLOOD PRESSURE: 120 MMHG

## 2021-05-09 VITALS
OXYGEN SATURATION: 98 % | WEIGHT: 177.5 LBS | HEART RATE: 76 BPM | BODY MASS INDEX: 30.3 KG/M2 | DIASTOLIC BLOOD PRESSURE: 70 MMHG | SYSTOLIC BLOOD PRESSURE: 110 MMHG | TEMPERATURE: 97 F | HEIGHT: 64 IN

## 2021-05-09 VITALS
OXYGEN SATURATION: 98 % | HEART RATE: 79 BPM | BODY MASS INDEX: 28.23 KG/M2 | WEIGHT: 165.37 LBS | HEIGHT: 64 IN | DIASTOLIC BLOOD PRESSURE: 88 MMHG | TEMPERATURE: 96.9 F | SYSTOLIC BLOOD PRESSURE: 120 MMHG

## 2021-05-16 VITALS — WEIGHT: 177 LBS | HEIGHT: 65 IN | BODY MASS INDEX: 29.49 KG/M2 | HEART RATE: 78 BPM

## 2021-05-16 VITALS
HEIGHT: 65 IN | DIASTOLIC BLOOD PRESSURE: 55 MMHG | WEIGHT: 179 LBS | OXYGEN SATURATION: 98 % | SYSTOLIC BLOOD PRESSURE: 125 MMHG | BODY MASS INDEX: 29.82 KG/M2 | HEART RATE: 62 BPM

## 2021-05-16 VITALS
BODY MASS INDEX: 29.99 KG/M2 | SYSTOLIC BLOOD PRESSURE: 115 MMHG | HEIGHT: 65 IN | HEART RATE: 65 BPM | DIASTOLIC BLOOD PRESSURE: 74 MMHG | WEIGHT: 180 LBS | OXYGEN SATURATION: 99 %

## 2021-05-16 VITALS — HEIGHT: 65 IN | OXYGEN SATURATION: 99 % | BODY MASS INDEX: 30.16 KG/M2 | WEIGHT: 181 LBS | HEART RATE: 63 BPM

## 2021-06-04 ENCOUNTER — OFFICE VISIT CONVERTED (OUTPATIENT)
Dept: FAMILY MEDICINE CLINIC | Facility: CLINIC | Age: 36
End: 2021-06-04
Attending: NURSE PRACTITIONER

## 2021-06-05 NOTE — PROGRESS NOTES
Progress Note      Patient Name: Crystal Mast   Patient ID: [id number]   Sex: Female   YOB: 1985    Referring Provider: Karin FERGUSON    Visit Date: June 4, 2021    Provider: PHYLLIS Rodrigez   Location: [facility] [place]   Location Address: [address] [place] [address] [place]  [address]   Location Phone: [phone]          Chief Complaint     wants to discuss anxiety medication and refills, b/p has been running high and pain management wanted her to tell her primary care       History Of Present Illness  Crystal Mast is a 36 year old /White female who presents for evaluation and treatment of:      Anxiety and depression: pt states she still grieves over her mother and now has lost her father, passed on 4/28/21 and passed away fairly suddenly, had 1 kidney due to cancer and then went hospital for kidney failure, aggressive lymphoma then on vent and then passed after about 1.5weeks and she barely was able to talk to him - she has been having a difficult time the loss of her dad and having nightmares and feels guilty     Elevated BP readings - Pain Management told her to discuss with PCP - elevated at Pain Management the past few days       Past Medical History  Disease Name Date Onset Notes   Anxiety and depression --  --    Breast infection --  --    Insomnia --  --          Past Surgical History  Procedure Name Date Notes   Breast reconstruction with latissimus dorsi myocutaneous flap --  --    Breast Reduction --  --    Ceasarian section --  3 or more   Tubal ligation --  --          Medication List  Name Date Started Instructions   buspirone 7.5 mg oral tablet 06/04/2021 take 1 tablet by oral route 3 times a day for 30 days   duloxetine 40 mg oral capsule,delayed release(/EC) 06/04/2021 take 1 capsule (40 mg) by oral route once daily for 90 days   fluoxetine 20 mg oral capsule 06/04/2021 take 3 capsules (60 mg) by oral route once daily in the morning for 30 days  "  gabapentin 100 mg oral capsule  take 2 capsules by oral route 2 times a day   hydrocodone-acetaminophen 5-325 mg oral tablet  take 1 tablet by oral route every 6 hours as needed for pain   meloxicam 7.5 mg oral tablet  take 1 tablet (7.5 mg) by oral route once daily         Allergy List  Allergen Name Date Reaction Notes   NO KNOWN DRUG ALLERGIES --  --  --          Family Medical History  Disease Name Relative/Age Notes   Hypertension  --    *No Significant Family Medical History  --          Social History  Finding Status Start/Stop Quantity Notes   Alcohol Never --/-- --  never drinks alcohol   Exercises regularly --  --/-- --  occasionally   Recreational Drug Use Never --/-- --  never used   Second hand smoke exposure Unknown --/-- --  no   Tobacco Current every day --/-- 0.5 PPD current every day smoker, smokes less than 1 pack per day, for 15 years, never uses other tobacco products   Uses seatbelts --  --/-- --  yes         Immunizations  NameDate Admin Mfg Trade Name Lot Number Route Inj VIS Given VIS Publication   Tdap09/01/2015 NE Not Entered  NE NE 07/29/2019    Comments:          Review of Systems  · Constitutional  o Admits  o : mild intermittent headache that resolves with ibuprofen usually  · Cardiovascular  o Denies  o : chest pain, palpitations  · Psychiatric  o Admits  o : difficulty sleeping  o Denies  o : suicidal ideation      Vitals  Date Time BP Position Site L\R Cuff Size HR RR TEMP (F) WT  HT  BMI kg/m2 BSA m2 O2 Sat FR L/min FiO2 HC       06/04/2021 01:15 /100 Sitting    89 - R  96.9 167lbs 8oz 5'  4\" 28.75 1.85 98 %  21%          Physical Examination  · Constitutional  o Appearance  o : well developed, well-nourished, in no acute distress  · Eyes  o Conjunctivae  o : conjunctivae normal  o Pupils and Irises  o : pupils equal and round, pupils reactive to light bilaterally  · Neck  o Inspection/Palpation  o : supple  o Thyroid  o : no thyromegaly  · Respiratory  o Respiratory " Effort  o : breathing unlabored  o Auscultation of Lungs  o : clear to ascultation  · Cardiovascular  o Heart  o :   § Auscultation of Heart  § : regular rate and rhythm  o Peripheral Vascular System  o :   § Extremities  § : no edema  · Lymphatic  o Neck  o : no lymphadenopathy present  · Musculoskeletal  o General  o :   § General Musculoskeletal  § : Muscle tone, strength, and development grossly normal.  · Skin and Subcutaneous Tissue  o General Inspection  o : NL tone  · Neurologic  o Gait and Station  o :   § Gait Screening  § : normal gait  · Psychiatric  o Mood and Affect  o : depressed, tearful              Assessment  · Anxiety disorder     300.00/F41.9  · Anxiety and depression       Anxiety disorder, unspecified     300.00/F41.9  Major depressive disorder, single episode, unspecified     300.00/F32.9  · Insomnia     780.52/G47.00  · Nightmares     307.47/F51.5      Plan  · Orders  o ACO-17: Screened for tobacco use AND received tobacco cessation intervention (4004F) - - 06/04/2021  o ACO-39: Current medications updated and reviewed (1159F, ) - - 06/04/2021  · Medications  o lisinopril 5 mg oral tablet   SIG: take 1 tablet (5 mg) by oral route once daily for 90 days   DISP: (90) Tablet with 0 refills  Prescribed on 06/04/2021     o prazosin 1 mg oral capsule   SIG: take 1 capsule by oral route once a day (at bedtime) for 30 days   DISP: (30) Capsule with 0 refills  Prescribed on 06/04/2021     o buspirone 7.5 mg oral tablet   SIG: take 1 tablet by oral route 3 times a day for 30 days   DISP: (90) Tablet with 2 refills  Adjusted on 06/04/2021     o duloxetine 40 mg oral capsule,delayed release(DR/EC)   SIG: take 1 capsule (40 mg) by oral route once daily for 90 days   DISP: (90) Capsule with 1 refills  Adjusted on 06/04/2021     o fluoxetine 20 mg oral capsule   SIG: take 3 capsules (60 mg) by oral route once daily in the morning for 30 days   DISP: (90) Capsule with 2 refills  Adjusted on 06/04/2021      o amitriptyline 25 mg oral tablet   SIG: TAKE ONE TABLET BY MOUTH EVERY NIGHT AT BEDTIME   DISP: (30) Tablet with 2 refills  Discontinued on 06/04/2021     o Medications have been Reconciled  o Transition of Care or Provider Policy  · Instructions  o Discussed the need for therapy, either with a certified counselor, psychologist, and/or family . If no improvement is noted or worsening of their condition, return to office or ER. But also discussed with patient that if they are non-responsive to the type of medication they may need to see a psychaitrist for further evaluation and management.   o Patient was educated/instructed on their diagnosis, treatment and medications prior to discharge from the clinic today.  · Disposition  o Return Visit Request in/on 2 weeks +/- 2 days (4816).     Increase duloxetine and Buspar - add prazosin for nightmares to help improve sleep - follow up in 2 weeks.  HTN: start lisinopril at least 3 days apart from new start on prazosin             Electronically Signed by: PHYLLIS Rodrigez -Author on June 4, 2021 01:54:02 PM

## 2021-07-15 VITALS
DIASTOLIC BLOOD PRESSURE: 100 MMHG | WEIGHT: 167.5 LBS | SYSTOLIC BLOOD PRESSURE: 142 MMHG | TEMPERATURE: 96.9 F | HEART RATE: 89 BPM | BODY MASS INDEX: 28.6 KG/M2 | OXYGEN SATURATION: 98 % | HEIGHT: 64 IN

## 2021-08-02 ENCOUNTER — OFFICE VISIT (OUTPATIENT)
Dept: FAMILY MEDICINE CLINIC | Facility: CLINIC | Age: 36
End: 2021-08-02

## 2021-08-02 VITALS
OXYGEN SATURATION: 99 % | WEIGHT: 168 LBS | HEART RATE: 75 BPM | SYSTOLIC BLOOD PRESSURE: 156 MMHG | TEMPERATURE: 98.1 F | BODY MASS INDEX: 28.84 KG/M2 | DIASTOLIC BLOOD PRESSURE: 80 MMHG

## 2021-08-02 DIAGNOSIS — I10 HYPERTENSION, UNSPECIFIED TYPE: ICD-10-CM

## 2021-08-02 DIAGNOSIS — R09.89 UPPER RESPIRATORY SYMPTOM: ICD-10-CM

## 2021-08-02 DIAGNOSIS — J40 BRONCHITIS: Primary | ICD-10-CM

## 2021-08-02 LAB
EXPIRATION DATE: NORMAL
INTERNAL CONTROL: NORMAL
Lab: NORMAL
SARS-COV-2 AG UPPER RESP QL IA.RAPID: NOT DETECTED

## 2021-08-02 PROCEDURE — 99214 OFFICE O/P EST MOD 30 MIN: CPT | Performed by: NURSE PRACTITIONER

## 2021-08-02 PROCEDURE — 87426 SARSCOV CORONAVIRUS AG IA: CPT | Performed by: NURSE PRACTITIONER

## 2021-08-02 RX ORDER — MELOXICAM 7.5 MG/1
1 TABLET ORAL DAILY
COMMUNITY
End: 2022-01-18

## 2021-08-02 RX ORDER — ALBUTEROL SULFATE 90 UG/1
2 AEROSOL, METERED RESPIRATORY (INHALATION) EVERY 4 HOURS PRN
Qty: 6.7 G | Refills: 0 | Status: SHIPPED | OUTPATIENT
Start: 2021-08-02 | End: 2022-01-18 | Stop reason: SDUPTHER

## 2021-08-02 RX ORDER — LISINOPRIL 5 MG/1
5 TABLET ORAL DAILY
COMMUNITY
End: 2021-09-03 | Stop reason: SDUPTHER

## 2021-08-02 RX ORDER — HYDROCODONE BITARTRATE AND ACETAMINOPHEN 5; 325 MG/1; MG/1
1 TABLET ORAL DAILY
COMMUNITY
End: 2022-08-26 | Stop reason: DRUGHIGH

## 2021-08-02 RX ORDER — BUSPIRONE HYDROCHLORIDE 5 MG/1
5 TABLET ORAL 3 TIMES DAILY
COMMUNITY
End: 2021-12-14 | Stop reason: SDUPTHER

## 2021-08-02 RX ORDER — DULOXETIN HYDROCHLORIDE 30 MG/1
1 CAPSULE, DELAYED RELEASE ORAL DAILY
COMMUNITY
End: 2022-01-18

## 2021-08-02 RX ORDER — PRAZOSIN HYDROCHLORIDE 5 MG/1
1 CAPSULE ORAL NIGHTLY
COMMUNITY
End: 2022-01-18

## 2021-08-02 RX ORDER — AMOXICILLIN 500 MG/1
1000 CAPSULE ORAL 2 TIMES DAILY
Qty: 40 CAPSULE | Refills: 0 | Status: SHIPPED | OUTPATIENT
Start: 2021-08-02 | End: 2021-10-04

## 2021-08-02 RX ORDER — GABAPENTIN 100 MG/1
2 CAPSULE ORAL 3 TIMES DAILY
COMMUNITY
End: 2023-01-11

## 2021-08-02 RX ORDER — BENZONATATE 100 MG/1
200 CAPSULE ORAL 3 TIMES DAILY PRN
Qty: 21 CAPSULE | Refills: 0 | Status: SHIPPED | OUTPATIENT
Start: 2021-08-02 | End: 2022-01-18

## 2021-08-02 RX ORDER — FLUOXETINE HYDROCHLORIDE 20 MG/1
60 CAPSULE ORAL DAILY
COMMUNITY
End: 2021-12-01

## 2021-08-02 NOTE — PROGRESS NOTES
Chief Complaint  Headache (Headache, congestion x 6 days.) and URI    Subjective          Crystal Mast presents to [facility]  Patient presents with complaint of upper respiratory symptoms and headache.  Symptoms started 5 to 6 days ago.    Symptoms started with a headache then progressed to major congestion in her chest.  Her throat hurts.  Head hurts when she is coughing.  Cough is productive with yellow sputum.  She is a smoker at less than 1 pack a day.  Is not interested in cessation.  She vomited the first night of the illness    Denies fever, sinus pressure, nausea or diarrhea.    She has used over-the-counter sinus medication with no significant improvement.  She has been drinking a moderate amount of water and tea.    Blood pressure is noted to be elevated today.  She states that she has a blood pressure cuff at home but does not routinely check her blood pressure.      She has not had a Covid vaccine           Past Medical History:   Diagnosis Date   • Anxiety and depression    • Breast infection    • Insomnia        No Known Allergies     Past Surgical History:   Procedure Laterality Date   • BILATERAL BREAST REDUCTION     • BREAST RECONSTRUCTION      WITH LATISSIMUS DORSI MYOCUTANEOUS FLAP   •  SECTION      3 OR MORE   • TUBAL ABDOMINAL LIGATION          Social History     Socioeconomic History   • Marital status:      Spouse name: Not on file   • Number of children: Not on file   • Years of education: Not on file   • Highest education level: Not on file   Tobacco Use   • Smoking status: Current Every Day Smoker     Packs/day: 0.50     Years: 15.00     Pack years: 7.50   • Smokeless tobacco: Never Used   • Tobacco comment: CURRENT EVERY DAY SMOKE, SMOKES LESS THAN 1  PACK PER DAY , FOR 15 YEARS , NEVER USES OTHER TOBACCO PRODUCTS    Vaping Use   • Vaping Use: Never used   Substance and Sexual Activity   • Alcohol use: Never   • Drug use: Never        Family History   Problem Relation Age of Onset   • Hypertension Other         Health Maintenance Due   Topic Date Due   • ANNUAL PHYSICAL  Never done   • Pneumococcal Vaccine 0-64 (1 of 2 - PPSV23) Never done   • COVID-19 Vaccine (1) Never done   • HEPATITIS C SCREENING  Never done   • PAP SMEAR  Never done        Last Completed Pap Smear     This patient has no relevant Health Maintenance data.          Last Completed Mammogram     This patient has no relevant Health Maintenance data.          Last Completed Colonoscopy     This patient has no relevant Health Maintenance data.          Current Outpatient Medications on File Prior to Visit   Medication Sig   • busPIRone (BUSPAR) 5 MG tablet Take 5 mg by mouth 3 (Three) Times a Day.   • DULoxetine (Cymbalta) 30 MG capsule Take 1 capsule by mouth Daily.   • FLUoxetine (PROzac) 20 MG capsule Take 60 mg by mouth Daily.   • gabapentin (NEURONTIN) 100 MG capsule Take 2 capsules by mouth 3 (Three) Times a Day.   • HYDROcodone-acetaminophen (NORCO) 5-325 MG per tablet Take 1 tablet by mouth Daily.   • lisinopril (PRINIVIL,ZESTRIL) 5 MG tablet Take 5 mg by mouth Daily.   • meloxicam (MOBIC) 7.5 MG tablet Take 1 tablet by mouth Daily.   • prazosin (MINIPRESS) 5 MG capsule Take 1 mg by mouth Every Night.     No current facility-administered medications on file prior to visit.       Immunization History   Administered Date(s) Administered   • Tdap 09/01/2015       [facility]  Review of Systems     Objective     /80   Pulse 75   Temp 98.1 °F (36.7 °C)   Wt 76.2 kg (168 lb)   SpO2 99%   BMI 28.84 kg/m²         Physical Exam  Vitals and nursing note reviewed.   Constitutional:       Appearance: Normal appearance.   HENT:      Right Ear: Tympanic membrane, ear canal and external ear normal.      Left Ear: Tympanic membrane, ear canal and external ear normal.      Mouth/Throat:      Mouth: Mucous membranes are moist.      Pharynx: Oropharynx is clear.    Cardiovascular:      Rate and Rhythm: Normal rate and regular rhythm.      Heart sounds: Normal heart sounds.   Pulmonary:      Effort: Pulmonary effort is normal.      Breath sounds: Wheezing and rhonchi present.   Abdominal:      Palpations: Abdomen is soft.   Musculoskeletal:      Cervical back: Normal range of motion.   Skin:     General: Skin is warm and dry.   Neurological:      Mental Status: She is alert.   Psychiatric:         Mood and Affect: Mood normal.           Result Review :                    POCT SARS-CoV-2 Antigen CASSANDRA (08/02/2021 15:25)         Assessment and Plan        Diagnoses and all orders for this visit:    1. Bronchitis (Primary)  -     amoxicillin (AMOXIL) 500 MG capsule; Take 2 capsules by mouth 2 (Two) Times a Day.  Dispense: 40 capsule; Refill: 0  -     benzonatate (Tessalon Perles) 100 MG capsule; Take 2 capsules by mouth 3 (Three) Times a Day As Needed for Cough.  Dispense: 21 capsule; Refill: 0  -     albuterol sulfate  (90 Base) MCG/ACT inhaler; Inhale 2 puffs Every 4 (Four) Hours As Needed for Wheezing.  Dispense: 6.7 g; Refill: 0    2. Upper respiratory symptom  -     POCT SARS-CoV-2 Antigen CASSANDRA    3. Hypertension, unspecified type              Follow Up   Advised to avoid over-the-counter medications for cold symptoms such as decongestants as blood pressure is elevated today.  Advised to check blood pressure at home.  If continues to be elevated, needs to follow-up with primary care provider.    Return if symptoms worsen or fail to improve.    Patient was given instructions and counseling regarding her condition or for health maintenance advice. Please see specific information pulled into the AVS if appropriate.

## 2021-09-03 RX ORDER — LISINOPRIL 5 MG/1
10 TABLET ORAL DAILY
Qty: 90 TABLET | Refills: 0 | Status: SHIPPED | OUTPATIENT
Start: 2021-09-03 | End: 2021-09-03 | Stop reason: SDUPTHER

## 2021-09-03 RX ORDER — LISINOPRIL 10 MG/1
10 TABLET ORAL DAILY
Qty: 90 TABLET | Refills: 0 | Status: SHIPPED | OUTPATIENT
Start: 2021-09-03 | End: 2022-01-18 | Stop reason: SDUPTHER

## 2021-09-03 NOTE — PROGRESS NOTES
Patient was in the office today with her , who had an appointment.  Patient states that while she was sick she was told to decrease her blood pressure medication but when she went to pain management her blood pressure was elevated at 160/90s.  Patient would like to increase her blood pressure medication back.    Lisinopril dose increased from 5 mg daily to 10 mg daily.

## 2021-10-04 ENCOUNTER — OFFICE VISIT (OUTPATIENT)
Dept: FAMILY MEDICINE CLINIC | Facility: CLINIC | Age: 36
End: 2021-10-04

## 2021-10-04 VITALS — TEMPERATURE: 97.5 F | OXYGEN SATURATION: 95 % | HEART RATE: 79 BPM

## 2021-10-04 DIAGNOSIS — R05.9 COUGH: ICD-10-CM

## 2021-10-04 DIAGNOSIS — Z20.828 EXPOSURE TO SARS VIRUS: Primary | ICD-10-CM

## 2021-10-04 DIAGNOSIS — R51.9 NONINTRACTABLE HEADACHE, UNSPECIFIED CHRONICITY PATTERN, UNSPECIFIED HEADACHE TYPE: ICD-10-CM

## 2021-10-04 DIAGNOSIS — J02.9 PHARYNGITIS, UNSPECIFIED ETIOLOGY: ICD-10-CM

## 2021-10-04 DIAGNOSIS — J18.9 PNEUMONIA OF LEFT LOWER LOBE DUE TO INFECTIOUS ORGANISM: ICD-10-CM

## 2021-10-04 LAB
EXPIRATION DATE: NORMAL
INTERNAL CONTROL: NORMAL
Lab: NORMAL
SARS-COV-2 AG UPPER RESP QL IA.RAPID: NOT DETECTED
SARS-COV-2 N GENE RESP QL NAA+PROBE: NOT DETECTED

## 2021-10-04 PROCEDURE — 99213 OFFICE O/P EST LOW 20 MIN: CPT | Performed by: FAMILY MEDICINE

## 2021-10-04 PROCEDURE — 87635 SARS-COV-2 COVID-19 AMP PRB: CPT | Performed by: FAMILY MEDICINE

## 2021-10-04 PROCEDURE — 87426 SARSCOV CORONAVIRUS AG IA: CPT | Performed by: FAMILY MEDICINE

## 2021-10-04 RX ORDER — SACCHAROMYCES BOULARDII 250 MG
250 CAPSULE ORAL 2 TIMES DAILY
Qty: 20 CAPSULE | Refills: 0 | Status: SHIPPED | OUTPATIENT
Start: 2021-10-04 | End: 2021-10-14

## 2021-10-04 RX ORDER — DEXAMETHASONE 6 MG/1
6 TABLET ORAL
Qty: 5 TABLET | Refills: 0 | Status: SHIPPED | OUTPATIENT
Start: 2021-10-04 | End: 2021-10-09

## 2021-10-04 RX ORDER — CEFDINIR 300 MG/1
300 CAPSULE ORAL 2 TIMES DAILY
Qty: 14 CAPSULE | Refills: 0 | Status: SHIPPED | OUTPATIENT
Start: 2021-10-04 | End: 2021-10-11

## 2021-10-04 RX ORDER — DULOXETIN HYDROCHLORIDE 20 MG/1
40 CAPSULE, DELAYED RELEASE ORAL DAILY
COMMUNITY
Start: 2021-08-30 | End: 2022-01-18 | Stop reason: SDUPTHER

## 2021-10-04 RX ORDER — MELOXICAM 15 MG/1
15 TABLET ORAL DAILY
COMMUNITY
Start: 2021-07-23 | End: 2022-01-18 | Stop reason: SDUPTHER

## 2021-10-04 NOTE — PROGRESS NOTES
Chief Complaint  Headache (duaghter +covid,headache,short of breath at times)    Subjective          Crystal Mast presents to [facility]  Pt is not vaccinated for covid.  Pt has been exposed to daughter, who has covid.    URI   This is a new problem. The current episode started yesterday. The problem has been gradually worsening. There has been no fever. Associated symptoms include congestion, coughing, headaches, a sore throat and wheezing. Pertinent negatives include no abdominal pain, chest pain, diarrhea, dysuria, ear pain, joint pain, joint swelling, nausea, neck pain, plugged ear sensation, rash, rhinorrhea, sinus pain, sneezing, swollen glands or vomiting. She has tried nothing for the symptoms.       Objective   No Known Allergies  Immunization History   Administered Date(s) Administered   • Tdap 09/01/2015       Vital Signs:   Vitals:    10/04/21 1016   Pulse: 79   Temp: 97.5 °F (36.4 °C)   TempSrc: Temporal   SpO2: 95%       Physical Exam  Vitals reviewed.   Constitutional:       Appearance: Normal appearance. She is well-developed.   HENT:      Head: Normocephalic and atraumatic.      Right Ear: Tympanic membrane, ear canal and external ear normal.      Left Ear: Tympanic membrane, ear canal and external ear normal.      Nose: Nose normal.      Mouth/Throat:      Mouth: Mucous membranes are moist.      Pharynx: Oropharynx is clear. Posterior oropharyngeal erythema present. No oropharyngeal exudate.   Eyes:      Conjunctiva/sclera: Conjunctivae normal.      Pupils: Pupils are equal, round, and reactive to light.   Cardiovascular:      Rate and Rhythm: Normal rate and regular rhythm.      Pulses: Normal pulses.      Heart sounds: Normal heart sounds. No murmur heard.   No friction rub. No gallop.    Pulmonary:      Effort: Pulmonary effort is normal.      Breath sounds: No wheezing or rhonchi.      Comments: Bilateral course sounds.  Abdominal:      General: Abdomen is  flat. Bowel sounds are normal. There is no distension.      Palpations: Abdomen is soft. There is no mass.      Tenderness: There is no abdominal tenderness. There is no guarding or rebound.      Hernia: No hernia is present.   Musculoskeletal:         General: Normal range of motion.      Cervical back: Normal range of motion and neck supple.   Lymphadenopathy:      Cervical: No cervical adenopathy.   Skin:     General: Skin is warm and dry.      Capillary Refill: Capillary refill takes less than 2 seconds.   Neurological:      Mental Status: She is alert and oriented to person, place, and time.      Cranial Nerves: No cranial nerve deficit.   Psychiatric:         Mood and Affect: Mood and affect normal.         Behavior: Behavior normal.         Thought Content: Thought content normal.         Judgment: Judgment normal.        Result Review :   The following data was reviewed by: Long Ross MD on 10/04/2021:    Data reviewed: Radiologic studies I viewed and interpreted 2 views chest x-rays: left lung base infiltrate.          Assessment and Plan    Diagnoses and all orders for this visit:    1. Exposure to SARS virus (Primary)  -     POCT SARS-CoV-2 Antigen CASSANDRA    2. Nonintractable headache, unspecified chronicity pattern, unspecified headache type  -     POCT SARS-CoV-2 Antigen CASSANDRA    3. Cough  -     XR Chest PA & Lateral (In Office)  -     COVID-19,CEPHEID/SENAIT/BDMAX,COR/ARACELI/PAD/JUDI IN-HOUSE(OR EMERGENT/ADD-ON),NP SWAB IN TRANSPORT MEDIA 3-4 HR TAT, RT-PCR - Swab, Nasopharynx    4. Pharyngitis, unspecified etiology  -     XR Chest PA & Lateral (In Office)  -     COVID-19,CEPHEID/SENAIT/BDMAX,COR/ARACELI/PAD/JUDI IN-HOUSE(OR EMERGENT/ADD-ON),NP SWAB IN TRANSPORT MEDIA 3-4 HR TAT, RT-PCR - Swab, Nasopharynx    5. Pneumonia of left lower lobe due to infectious organism  -     COVID-19,CEPHEID/SENAIT/BDMAX,COR/ARACELI/PAD/JUDI IN-HOUSE(OR EMERGENT/ADD-ON),NP SWAB IN TRANSPORT MEDIA 3-4 HR TAT, RT-PCR - Swab, Nasopharynx  -      cefdinir (OMNICEF) 300 MG capsule; Take 1 capsule by mouth 2 (Two) Times a Day for 7 days.  Dispense: 14 capsule; Refill: 0  -     saccharomyces boulardii (Florastor) 250 MG capsule; Take 1 capsule by mouth 2 (Two) Times a Day for 10 days.  Dispense: 20 capsule; Refill: 0  -     dexamethasone (DECADRON) 6 MG tablet; Take 1 tablet by mouth Daily With Breakfast for 5 days.  Dispense: 5 tablet; Refill: 0            Follow Up   Return in about 1 week (around 10/11/2021), or if symptoms worsen or fail to improve.  Patient was given instructions and counseling regarding her condition or for health maintenance advice. Please see specific information pulled into the AVS if appropriate.

## 2021-10-20 ENCOUNTER — OFFICE VISIT (OUTPATIENT)
Dept: FAMILY MEDICINE CLINIC | Facility: CLINIC | Age: 36
End: 2021-10-20

## 2021-10-20 VITALS
BODY MASS INDEX: 29.18 KG/M2 | HEART RATE: 79 BPM | WEIGHT: 170 LBS | DIASTOLIC BLOOD PRESSURE: 84 MMHG | SYSTOLIC BLOOD PRESSURE: 130 MMHG | TEMPERATURE: 97 F | OXYGEN SATURATION: 99 %

## 2021-10-20 DIAGNOSIS — J18.9 PNEUMONITIS: Primary | ICD-10-CM

## 2021-10-20 PROCEDURE — 99213 OFFICE O/P EST LOW 20 MIN: CPT | Performed by: FAMILY MEDICINE

## 2021-10-20 NOTE — PROGRESS NOTES
Chief Complaint    Pneumonia (follow up)    Subjective      Crystal Mast presents to [facility]     History of Present Illness    1.) F/U PNA : Patient presents for follow-up visit after a recent visit here to this office secondary to acute symptoms. Chest x-ray at that time revealed a mild linear opacity of her left lung base, which favored atelectasis or pneumonia. She reports, however that she was treated for pneumonia and finished her course.  She continues to experience a cough. She also reports that she has been experiencing pain of her lower to mid back area. Denies any fevers or chills. No night sweats. She is a smoker - smokes 1 PPD - has smoked x 15 years.    Objective      Vital Signs:     /84   Pulse 79   Temp 97 °F (36.1 °C)   Wt 77.1 kg (170 lb)   SpO2 99%   BMI 29.18 kg/m²       Physical Exam  Vitals reviewed.   Constitutional:       General: She is not in acute distress.     Appearance: Normal appearance. She is well-developed.   HENT:      Head: Normocephalic and atraumatic.      Right Ear: Hearing and external ear normal.      Left Ear: Hearing and external ear normal.      Nose: Nose normal.   Eyes:      General: Lids are normal.         Right eye: No discharge.         Left eye: No discharge.      Conjunctiva/sclera: Conjunctivae normal.   Cardiovascular:      Rate and Rhythm: Normal rate and regular rhythm.   Pulmonary:      Effort: Pulmonary effort is normal.      Breath sounds: Normal breath sounds.   Abdominal:      General: There is no distension.   Musculoskeletal:         General: No swelling.      Cervical back: Neck supple.   Skin:     Coloration: Skin is not jaundiced.      Findings: No erythema.   Neurological:      Mental Status: She is alert. Mental status is at baseline.   Psychiatric:         Mood and Affect: Mood and affect normal.         Thought Content: Thought content normal.     Assessment and Plan     Diagnoses and all orders  for this visit:    1. Pneumonitis (Primary)  Comments:  1.) Adequate fluids and rest. CXR as noted. Will await official reports. Additional recommendations at that time.  Orders:  -     XR Chest PA & Lateral (In Office)    Follow Up     Return as needed.     Patient was given instructions and counseling regarding her condition or for health maintenance advice. Please see specific information pulled into the AVS if appropriate.

## 2021-10-21 ENCOUNTER — TELEPHONE (OUTPATIENT)
Dept: FAMILY MEDICINE CLINIC | Facility: CLINIC | Age: 36
End: 2021-10-21

## 2021-10-21 NOTE — TELEPHONE ENCOUNTER
Caller: Crystal Mast    Relationship: Self    Best call back number: [phone]     Caller requesting test results: YES    What test was performed: XR ON CHEST    When was the test performed: 10/20    Additional notes: PATIENT STATED SHE HAD RECEIVED RESULTS ON MYCHART BUT WOULD LIKE TO HAVE CLINICAL FOLLOW UP WITH HER TO DISCUSS THE RESULTS AS SOON AS POSSIBLE.

## 2021-10-25 ENCOUNTER — OFFICE VISIT (OUTPATIENT)
Dept: FAMILY MEDICINE CLINIC | Facility: CLINIC | Age: 36
End: 2021-10-25

## 2021-10-25 VITALS
SYSTOLIC BLOOD PRESSURE: 140 MMHG | WEIGHT: 169.2 LBS | DIASTOLIC BLOOD PRESSURE: 92 MMHG | OXYGEN SATURATION: 98 % | TEMPERATURE: 97.5 F | HEART RATE: 81 BPM | HEIGHT: 64 IN | BODY MASS INDEX: 28.89 KG/M2

## 2021-10-25 DIAGNOSIS — Z28.21 INFLUENZA VACCINATION DECLINED: ICD-10-CM

## 2021-10-25 DIAGNOSIS — R06.02 SOB (SHORTNESS OF BREATH) ON EXERTION: Primary | ICD-10-CM

## 2021-10-25 DIAGNOSIS — J18.9 PNEUMONIA OF LEFT LOWER LOBE DUE TO INFECTIOUS ORGANISM: ICD-10-CM

## 2021-10-25 DIAGNOSIS — R05.9 COUGH: ICD-10-CM

## 2021-10-25 PROBLEM — F41.9 ANXIETY AND DEPRESSION: Status: ACTIVE | Noted: 2021-10-25

## 2021-10-25 PROBLEM — M54.50 LOW BACK PAIN: Status: ACTIVE | Noted: 2021-10-25

## 2021-10-25 PROBLEM — M51.369 DEGENERATION OF LUMBAR INTERVERTEBRAL DISC: Status: ACTIVE | Noted: 2019-08-13

## 2021-10-25 PROBLEM — M51.36 DEGENERATION OF LUMBAR INTERVERTEBRAL DISC: Status: ACTIVE | Noted: 2019-08-13

## 2021-10-25 PROBLEM — M47.816 LUMBAR SPONDYLOSIS: Status: ACTIVE | Noted: 2019-08-13

## 2021-10-25 PROBLEM — M51.26 DISPLACEMENT OF LUMBAR INTERVERTEBRAL DISC WITHOUT MYELOPATHY: Status: ACTIVE | Noted: 2019-08-13

## 2021-10-25 PROBLEM — G43.909 MIGRAINES: Status: ACTIVE | Noted: 2021-10-25

## 2021-10-25 PROBLEM — M19.90 ARTHRITIS: Status: ACTIVE | Noted: 2021-10-25

## 2021-10-25 PROBLEM — F32.A ANXIETY AND DEPRESSION: Status: ACTIVE | Noted: 2021-10-25

## 2021-10-25 PROCEDURE — 99214 OFFICE O/P EST MOD 30 MIN: CPT | Performed by: NURSE PRACTITIONER

## 2021-10-25 RX ORDER — IPRATROPIUM BROMIDE 17 UG/1
2 AEROSOL, METERED RESPIRATORY (INHALATION)
Qty: 1 EACH | Refills: 0 | Status: SHIPPED | OUTPATIENT
Start: 2021-10-25 | End: 2022-01-18 | Stop reason: SDUPTHER

## 2021-10-25 RX ORDER — GUAIFENESIN AND DEXTROMETHORPHAN HYDROBROMIDE 600; 30 MG/1; MG/1
2 TABLET, EXTENDED RELEASE ORAL 2 TIMES DAILY PRN
Qty: 30 TABLET | Refills: 0 | Status: SHIPPED | OUTPATIENT
Start: 2021-10-25 | End: 2022-01-18

## 2021-10-25 RX ORDER — ALBUTEROL SULFATE 1.25 MG/3ML
1 SOLUTION RESPIRATORY (INHALATION) EVERY 6 HOURS PRN
Qty: 90 EACH | Refills: 0 | Status: SHIPPED | OUTPATIENT
Start: 2021-10-25

## 2021-10-25 NOTE — PROGRESS NOTES
Chief Complaint  Pneumonia (follow up to chest xray post pneumonia)    Subjective        Past Medical History:   Diagnosis Date   • Anxiety and depression    • Breast infection    • Insomnia      Social History     Tobacco Use   • Smoking status: Current Every Day Smoker     Packs/day: 1.00     Years: 15.00     Pack years: 15.00     Start date: 10/25/2002   • Smokeless tobacco: Never Used   • Tobacco comment: CURRENT EVERY DAY SMOKE, SMOKES LESS THAN 1  PACK PER DAY , FOR 15 YEARS , NEVER USES OTHER TOBACCO PRODUCTS    Vaping Use   • Vaping Use: Never used   Substance Use Topics   • Alcohol use: Never   • Drug use: Never      Current Outpatient Medications on File Prior to Visit   Medication Sig   • albuterol sulfate  (90 Base) MCG/ACT inhaler Inhale 2 puffs Every 4 (Four) Hours As Needed for Wheezing.   • busPIRone (BUSPAR) 5 MG tablet Take 5 mg by mouth 3 (Three) Times a Day.   • DULoxetine (CYMBALTA) 20 MG capsule Take 40 mg by mouth Daily.   • FLUoxetine (PROzac) 20 MG capsule Take 60 mg by mouth Daily.   • gabapentin (NEURONTIN) 100 MG capsule Take 2 capsules by mouth 3 (Three) Times a Day.   • HYDROcodone-acetaminophen (NORCO) 5-325 MG per tablet Take 1 tablet by mouth Daily.   • lisinopril (PRINIVIL,ZESTRIL) 10 MG tablet Take 1 tablet by mouth Daily.   • meloxicam (MOBIC) 15 MG tablet Take 15 mg by mouth Daily.   • prazosin (MINIPRESS) 5 MG capsule Take 1 mg by mouth Every Night.   • benzonatate (Tessalon Perles) 100 MG capsule Take 2 capsules by mouth 3 (Three) Times a Day As Needed for Cough.   • DULoxetine (Cymbalta) 30 MG capsule Take 1 capsule by mouth Daily.   • meloxicam (MOBIC) 7.5 MG tablet Take 1 tablet by mouth Daily.     No current facility-administered medications on file prior to visit.      No Known Allergies   Health Maintenance Due   Topic Date Due   • ANNUAL PHYSICAL  Never done   • Pneumococcal Vaccine 0-64 (1 of 2 - PPSV23) Never done   • COVID-19 Vaccine (1) Never done   •  "INFLUENZA VACCINE  Never done   • HEPATITIS C SCREENING  Never done   • PAP SMEAR  Never done      Crystal Mast presents to [facility]  CXR on 10/4/21 and given steroid and probiotic, inhaler. Then follow up last week with Dr. Wiley and repeat CXR with airspace disease. Pt states she was having worsening of cough and SOB especially with activity. Denies fever, chills, or body aches. No OTC cough medication taken. Continues to have productive cough.       Objective   Vital Signs:   /92 (BP Location: Left arm)   Pulse 81   Temp 97.5 °F (36.4 °C)   Ht 162.6 cm (64\")   Wt 76.7 kg (169 lb 3.2 oz)   SpO2 98%   BMI 29.04 kg/m²     Review of Systems   Physical Exam  Vitals reviewed.   Constitutional:       General: She is not in acute distress.     Appearance: Normal appearance. She is well-developed.   Eyes:      Conjunctiva/sclera: Conjunctivae normal.      Pupils: Pupils are equal, round, and reactive to light.   Cardiovascular:      Rate and Rhythm: Normal rate and regular rhythm.      Heart sounds: Normal heart sounds.   Pulmonary:      Effort: Pulmonary effort is normal.      Breath sounds: Examination of the right-lower field reveals decreased breath sounds. Examination of the left-lower field reveals decreased breath sounds. Decreased breath sounds (on expiration) present. No wheezing or rhonchi.   Skin:     General: Skin is warm and dry.   Neurological:      Mental Status: She is alert and oriented to person, place, and time.   Psychiatric:         Mood and Affect: Mood and affect normal.         Behavior: Behavior normal.         Thought Content: Thought content normal.         Judgment: Judgment normal.        Result Review :   The following data was reviewed by: PHYLLIS Cool on 10/25/2021:    Data reviewed: Radiologic studies CXR from 10/4 & 10/20          Assessment and Plan    Diagnoses and all orders for this visit:    1. SOB (shortness of breath) on " exertion (Primary)  -     albuterol (ACCUNEB) 1.25 MG/3ML nebulizer solution; Take 3 mL by nebulization Every 6 (Six) Hours As Needed for Wheezing.  Dispense: 90 each; Refill: 0  -     ipratropium (Atrovent HFA) 17 MCG/ACT inhaler; Inhale 2 puffs 2 (Two) Times a Day.  Dispense: 1 each; Refill: 0    2. Pneumonia of left lower lobe due to infectious organism  -     guaifenesin-dextromethorphan (MUCINEX DM)  MG tablet sustained-release 12 hour tablet; Take 2 tablets by mouth 2 (Two) Times a Day As Needed (cough).  Dispense: 30 tablet; Refill: 0  -     albuterol (ACCUNEB) 1.25 MG/3ML nebulizer solution; Take 3 mL by nebulization Every 6 (Six) Hours As Needed for Wheezing.  Dispense: 90 each; Refill: 0  -     ipratropium (Atrovent HFA) 17 MCG/ACT inhaler; Inhale 2 puffs 2 (Two) Times a Day.  Dispense: 1 each; Refill: 0    3. Cough  -     guaifenesin-dextromethorphan (MUCINEX DM)  MG tablet sustained-release 12 hour tablet; Take 2 tablets by mouth 2 (Two) Times a Day As Needed (cough).  Dispense: 30 tablet; Refill: 0    4. Influenza vaccination declined        Follow Up   Return in about 2 weeks (around 11/8/2021) for Recheck.  Patient was given instructions and counseling regarding her condition or for health maintenance advice. Please see specific information pulled into the AVS if appropriate.

## 2021-11-29 ENCOUNTER — CLINICAL SUPPORT (OUTPATIENT)
Dept: FAMILY MEDICINE CLINIC | Facility: CLINIC | Age: 36
End: 2021-11-29

## 2021-11-29 DIAGNOSIS — Z20.822 EXPOSURE TO COVID-19 VIRUS: Primary | ICD-10-CM

## 2021-11-29 LAB — SARS-COV-2 N GENE RESP QL NAA+PROBE: NOT DETECTED

## 2021-11-29 PROCEDURE — 87635 SARS-COV-2 COVID-19 AMP PRB: CPT | Performed by: NURSE PRACTITIONER

## 2021-12-01 RX ORDER — FLUOXETINE HYDROCHLORIDE 20 MG/1
CAPSULE ORAL
Qty: 90 CAPSULE | Refills: 0 | Status: SHIPPED | OUTPATIENT
Start: 2021-12-01 | End: 2022-01-18 | Stop reason: SDUPTHER

## 2021-12-10 DIAGNOSIS — F32.9 MAJOR DEPRESSIVE DISORDER, SINGLE EPISODE, UNSPECIFIED: ICD-10-CM

## 2021-12-14 RX ORDER — BUSPIRONE HYDROCHLORIDE 7.5 MG/1
TABLET ORAL
Qty: 90 TABLET | Refills: 0 | Status: SHIPPED | OUTPATIENT
Start: 2021-12-14 | End: 2022-01-18 | Stop reason: SDUPTHER

## 2022-01-13 DIAGNOSIS — F32.9 MAJOR DEPRESSIVE DISORDER, SINGLE EPISODE, UNSPECIFIED: ICD-10-CM

## 2022-01-13 RX ORDER — BUSPIRONE HYDROCHLORIDE 7.5 MG/1
TABLET ORAL
Qty: 90 TABLET | Refills: 0 | OUTPATIENT
Start: 2022-01-13

## 2022-01-18 ENCOUNTER — OFFICE VISIT (OUTPATIENT)
Dept: FAMILY MEDICINE CLINIC | Facility: CLINIC | Age: 37
End: 2022-01-18

## 2022-01-18 VITALS
TEMPERATURE: 96.7 F | SYSTOLIC BLOOD PRESSURE: 132 MMHG | DIASTOLIC BLOOD PRESSURE: 80 MMHG | OXYGEN SATURATION: 97 % | HEIGHT: 64 IN | HEART RATE: 79 BPM | BODY MASS INDEX: 28.68 KG/M2 | WEIGHT: 168 LBS

## 2022-01-18 DIAGNOSIS — I10 HYPERTENSION, UNSPECIFIED TYPE: Primary | ICD-10-CM

## 2022-01-18 DIAGNOSIS — F32.9 MAJOR DEPRESSIVE DISORDER, SINGLE EPISODE, UNSPECIFIED: ICD-10-CM

## 2022-01-18 DIAGNOSIS — E55.9 VITAMIN D INSUFFICIENCY: ICD-10-CM

## 2022-01-18 DIAGNOSIS — M47.816 LUMBAR SPONDYLOSIS: ICD-10-CM

## 2022-01-18 DIAGNOSIS — E53.8 FOLATE DEFICIENCY: ICD-10-CM

## 2022-01-18 DIAGNOSIS — R53.83 FATIGUE, UNSPECIFIED TYPE: ICD-10-CM

## 2022-01-18 DIAGNOSIS — M79.10 MUSCLE SORENESS: ICD-10-CM

## 2022-01-18 DIAGNOSIS — R06.02 SOB (SHORTNESS OF BREATH) ON EXERTION: ICD-10-CM

## 2022-01-18 LAB
25(OH)D3 SERPL-MCNC: 20.6 NG/ML (ref 30–100)
ALBUMIN SERPL-MCNC: 4.3 G/DL (ref 3.5–5.2)
ALBUMIN/GLOB SERPL: 1.7 G/DL
ALP SERPL-CCNC: 91 U/L (ref 39–117)
ALT SERPL W P-5'-P-CCNC: 13 U/L (ref 1–33)
ANION GAP SERPL CALCULATED.3IONS-SCNC: 6.8 MMOL/L (ref 5–15)
AST SERPL-CCNC: 13 U/L (ref 1–32)
BASOPHILS # BLD AUTO: 0.06 10*3/MM3 (ref 0–0.2)
BASOPHILS NFR BLD AUTO: 0.7 % (ref 0–1.5)
BILIRUB SERPL-MCNC: 0.5 MG/DL (ref 0–1.2)
BUN SERPL-MCNC: 9 MG/DL (ref 6–20)
BUN/CREAT SERPL: 14.5 (ref 7–25)
CALCIUM SPEC-SCNC: 9.2 MG/DL (ref 8.6–10.5)
CHLORIDE SERPL-SCNC: 101 MMOL/L (ref 98–107)
CO2 SERPL-SCNC: 30.2 MMOL/L (ref 22–29)
CREAT SERPL-MCNC: 0.62 MG/DL (ref 0.57–1)
DEPRECATED RDW RBC AUTO: 47.4 FL (ref 37–54)
EOSINOPHIL # BLD AUTO: 0.1 10*3/MM3 (ref 0–0.4)
EOSINOPHIL NFR BLD AUTO: 1.1 % (ref 0.3–6.2)
ERYTHROCYTE [DISTWIDTH] IN BLOOD BY AUTOMATED COUNT: 14.1 % (ref 12.3–15.4)
FERRITIN SERPL-MCNC: 39.5 NG/ML (ref 13–150)
FOLATE SERPL-MCNC: 2.49 NG/ML (ref 4.78–24.2)
GFR SERPL CREATININE-BSD FRML MDRD: 110 ML/MIN/1.73
GLOBULIN UR ELPH-MCNC: 2.6 GM/DL
GLUCOSE SERPL-MCNC: 85 MG/DL (ref 65–99)
HCT VFR BLD AUTO: 41.3 % (ref 34–46.6)
HGB BLD-MCNC: 13.7 G/DL (ref 12–15.9)
IMM GRANULOCYTES # BLD AUTO: 0.02 10*3/MM3 (ref 0–0.05)
IMM GRANULOCYTES NFR BLD AUTO: 0.2 % (ref 0–0.5)
IRON 24H UR-MRATE: 92 MCG/DL (ref 37–145)
IRON SATN MFR SERPL: 21 % (ref 20–50)
LYMPHOCYTES # BLD AUTO: 3.43 10*3/MM3 (ref 0.7–3.1)
LYMPHOCYTES NFR BLD AUTO: 38.6 % (ref 19.6–45.3)
MAGNESIUM SERPL-MCNC: 2.2 MG/DL (ref 1.6–2.6)
MCH RBC QN AUTO: 30.6 PG (ref 26.6–33)
MCHC RBC AUTO-ENTMCNC: 33.2 G/DL (ref 31.5–35.7)
MCV RBC AUTO: 92.2 FL (ref 79–97)
MONOCYTES # BLD AUTO: 0.6 10*3/MM3 (ref 0.1–0.9)
MONOCYTES NFR BLD AUTO: 6.8 % (ref 5–12)
NEUTROPHILS NFR BLD AUTO: 4.67 10*3/MM3 (ref 1.7–7)
NEUTROPHILS NFR BLD AUTO: 52.6 % (ref 42.7–76)
NRBC BLD AUTO-RTO: 0 /100 WBC (ref 0–0.2)
PLATELET # BLD AUTO: 236 10*3/MM3 (ref 140–450)
PMV BLD AUTO: 10.9 FL (ref 6–12)
POTASSIUM SERPL-SCNC: 3.8 MMOL/L (ref 3.5–5.2)
PROT SERPL-MCNC: 6.9 G/DL (ref 6–8.5)
RBC # BLD AUTO: 4.48 10*6/MM3 (ref 3.77–5.28)
SODIUM SERPL-SCNC: 138 MMOL/L (ref 136–145)
T4 FREE SERPL-MCNC: 0.84 NG/DL (ref 0.93–1.7)
TIBC SERPL-MCNC: 441 MCG/DL (ref 298–536)
TRANSFERRIN SERPL-MCNC: 296 MG/DL (ref 200–360)
TSH SERPL DL<=0.05 MIU/L-ACNC: 1.58 UIU/ML (ref 0.27–4.2)
VIT B12 BLD-MCNC: 418 PG/ML (ref 211–946)
WBC NRBC COR # BLD: 8.88 10*3/MM3 (ref 3.4–10.8)

## 2022-01-18 PROCEDURE — 84439 ASSAY OF FREE THYROXINE: CPT | Performed by: NURSE PRACTITIONER

## 2022-01-18 PROCEDURE — 82607 VITAMIN B-12: CPT | Performed by: NURSE PRACTITIONER

## 2022-01-18 PROCEDURE — 82728 ASSAY OF FERRITIN: CPT | Performed by: NURSE PRACTITIONER

## 2022-01-18 PROCEDURE — 82746 ASSAY OF FOLIC ACID SERUM: CPT | Performed by: NURSE PRACTITIONER

## 2022-01-18 PROCEDURE — 80053 COMPREHEN METABOLIC PANEL: CPT | Performed by: NURSE PRACTITIONER

## 2022-01-18 PROCEDURE — 84443 ASSAY THYROID STIM HORMONE: CPT | Performed by: NURSE PRACTITIONER

## 2022-01-18 PROCEDURE — 84466 ASSAY OF TRANSFERRIN: CPT | Performed by: NURSE PRACTITIONER

## 2022-01-18 PROCEDURE — 99214 OFFICE O/P EST MOD 30 MIN: CPT | Performed by: NURSE PRACTITIONER

## 2022-01-18 PROCEDURE — 83540 ASSAY OF IRON: CPT | Performed by: NURSE PRACTITIONER

## 2022-01-18 PROCEDURE — 83735 ASSAY OF MAGNESIUM: CPT | Performed by: NURSE PRACTITIONER

## 2022-01-18 PROCEDURE — 82306 VITAMIN D 25 HYDROXY: CPT | Performed by: NURSE PRACTITIONER

## 2022-01-18 PROCEDURE — 85025 COMPLETE CBC W/AUTO DIFF WBC: CPT | Performed by: NURSE PRACTITIONER

## 2022-01-18 RX ORDER — MELOXICAM 15 MG/1
15 TABLET ORAL DAILY
Qty: 90 TABLET | Refills: 1 | Status: SHIPPED | OUTPATIENT
Start: 2022-01-18 | End: 2022-08-26 | Stop reason: SDUPTHER

## 2022-01-18 RX ORDER — LISINOPRIL 5 MG/1
10 TABLET ORAL DAILY
COMMUNITY
Start: 2021-11-17 | End: 2022-01-18 | Stop reason: SDUPTHER

## 2022-01-18 RX ORDER — LISINOPRIL 10 MG/1
10 TABLET ORAL DAILY
Qty: 90 TABLET | Refills: 1 | Status: SHIPPED | OUTPATIENT
Start: 2022-01-18 | End: 2022-08-26 | Stop reason: SDUPTHER

## 2022-01-18 RX ORDER — IPRATROPIUM BROMIDE 17 UG/1
2 AEROSOL, METERED RESPIRATORY (INHALATION)
Qty: 1 EACH | Refills: 0 | Status: SHIPPED | OUTPATIENT
Start: 2022-01-18

## 2022-01-18 RX ORDER — ALBUTEROL SULFATE 90 UG/1
2 AEROSOL, METERED RESPIRATORY (INHALATION) EVERY 4 HOURS PRN
Qty: 6.7 G | Refills: 0 | Status: SHIPPED | OUTPATIENT
Start: 2022-01-18

## 2022-01-18 RX ORDER — BUSPIRONE HYDROCHLORIDE 7.5 MG/1
7.5 TABLET ORAL 3 TIMES DAILY
Qty: 270 TABLET | Refills: 1 | Status: SHIPPED | OUTPATIENT
Start: 2022-01-18 | End: 2022-08-26 | Stop reason: SDUPTHER

## 2022-01-18 RX ORDER — DULOXETIN HYDROCHLORIDE 20 MG/1
40 CAPSULE, DELAYED RELEASE ORAL DAILY
Qty: 180 CAPSULE | Refills: 0 | Status: SHIPPED | OUTPATIENT
Start: 2022-01-18 | End: 2022-05-06

## 2022-01-18 RX ORDER — FLUOXETINE HYDROCHLORIDE 20 MG/1
60 CAPSULE ORAL DAILY
Qty: 270 CAPSULE | Refills: 0 | Status: SHIPPED | OUTPATIENT
Start: 2022-01-18 | End: 2022-05-06

## 2022-01-18 NOTE — PROGRESS NOTES
Chief Complaint  Med Refill (wants to see if you can do some lab work, like vitamins, she has been so palma lately)    Subjective        Past Medical History:   Diagnosis Date   • Anxiety and depression    • Breast infection    • Insomnia      Social History     Tobacco Use   • Smoking status: Current Every Day Smoker     Packs/day: 1.00     Years: 15.00     Pack years: 15.00     Start date: 10/25/2002   • Smokeless tobacco: Never Used   • Tobacco comment: CURRENT EVERY DAY SMOKE, SMOKES LESS THAN 1  PACK PER DAY , FOR 15 YEARS , NEVER USES OTHER TOBACCO PRODUCTS    Vaping Use   • Vaping Use: Never used   Substance Use Topics   • Alcohol use: Never   • Drug use: Never      Current Outpatient Medications on File Prior to Visit   Medication Sig   • albuterol (ACCUNEB) 1.25 MG/3ML nebulizer solution Take 3 mL by nebulization Every 6 (Six) Hours As Needed for Wheezing.   • gabapentin (NEURONTIN) 100 MG capsule Take 2 capsules by mouth 3 (Three) Times a Day.   • HYDROcodone-acetaminophen (NORCO) 5-325 MG per tablet Take 1 tablet by mouth Daily.   • [DISCONTINUED] albuterol sulfate  (90 Base) MCG/ACT inhaler Inhale 2 puffs Every 4 (Four) Hours As Needed for Wheezing.   • [DISCONTINUED] benzonatate (Tessalon Perles) 100 MG capsule Take 2 capsules by mouth 3 (Three) Times a Day As Needed for Cough.   • [DISCONTINUED] busPIRone (BUSPAR) 7.5 MG tablet TAKE 1 TABLET BY MOUTH THREE TIMES DAILY   • [DISCONTINUED] DULoxetine (CYMBALTA) 20 MG capsule Take 40 mg by mouth Daily.   • [DISCONTINUED] DULoxetine (Cymbalta) 30 MG capsule Take 1 capsule by mouth Daily.   • [DISCONTINUED] FLUoxetine (PROzac) 20 MG capsule TAKE THREE CAPSULES BY MOUTH ONCE EVERY MORNING FOR 30 DAYS.   • [DISCONTINUED] guaifenesin-dextromethorphan (MUCINEX DM)  MG tablet sustained-release 12 hour tablet Take 2 tablets by mouth 2 (Two) Times a Day As Needed (cough).   • [DISCONTINUED] ipratropium (Atrovent HFA) 17 MCG/ACT inhaler Inhale 2 puffs 2  "(Two) Times a Day.   • [DISCONTINUED] lisinopril (PRINIVIL,ZESTRIL) 10 MG tablet Take 1 tablet by mouth Daily.   • [DISCONTINUED] lisinopril (PRINIVIL,ZESTRIL) 5 MG tablet Take 10 mg by mouth Daily.   • [DISCONTINUED] meloxicam (MOBIC) 15 MG tablet Take 15 mg by mouth Daily.   • [DISCONTINUED] meloxicam (MOBIC) 7.5 MG tablet Take 1 tablet by mouth Daily.   • [DISCONTINUED] prazosin (MINIPRESS) 5 MG capsule Take 1 mg by mouth Every Night.     No current facility-administered medications on file prior to visit.      No Known Allergies   Health Maintenance Due   Topic Date Due   • ANNUAL PHYSICAL  Never done   • Pneumococcal Vaccine 0-64 (1 of 2 - PPSV23) Never done   • HEPATITIS C SCREENING  Never done   • PAP SMEAR  Never done      Crystal Mast presents to [facility]  Here to follow up on depression and would like to have labs drawn.    Depression: Pt states she is trying to make it, the holidays are very difficult and states nearly 3 years since mother passed, 1 year anniversary of sister's death. Close motherly figure passed away just before [place]. Pt states her children have noted she being alone a lot. Pt states she feels more palma recently and prefers to be alone.     Pt states she is tired all the time. Pt states she will have diffuse pain and feels sore all the time and fatigued.     Hypertension: Blood pressure is well controlled current dose of lisinopril.  Patient denies side effects of medication.      Objective   Vital Signs:   /80 (BP Location: Left arm, Patient Position: Sitting, Cuff Size: Adult)   Pulse 79   Temp 96.7 °F (35.9 °C) (Temporal)   Ht 162.6 cm (64\")   Wt 76.2 kg (168 lb)   SpO2 97%   BMI 28.84 kg/m²     Review of Systems   Physical Exam  Vitals reviewed.   Constitutional:       General: She is not in acute distress.     Appearance: Normal appearance. She is well-developed.   HENT:      Head: Normocephalic and atraumatic.      Right Ear: " External ear normal.      Left Ear: External ear normal.   Eyes:      Conjunctiva/sclera: Conjunctivae normal.      Pupils: Pupils are equal, round, and reactive to light.   Cardiovascular:      Rate and Rhythm: Normal rate and regular rhythm.      Heart sounds: Normal heart sounds.   Pulmonary:      Effort: Pulmonary effort is normal.      Breath sounds: Normal breath sounds.   Musculoskeletal:      Cervical back: Neck supple.      Right lower leg: No edema.      Left lower leg: No edema.   Skin:     General: Skin is warm and dry.   Neurological:      Mental Status: She is alert and oriented to person, place, and time.      Cranial Nerves: No cranial nerve deficit.   Psychiatric:         Mood and Affect: Mood normal. Affect is tearful.         Behavior: Behavior normal.         Thought Content: Thought content normal.         Judgment: Judgment normal.        Result Review :                 Assessment and Plan    Diagnoses and all orders for this visit:    1. Hypertension, unspecified type (Primary)  -     lisinopril (PRINIVIL,ZESTRIL) 10 MG tablet; Take 1 tablet by mouth Daily.  Dispense: 90 tablet; Refill: 1    2. Major depressive disorder, single episode, unspecified  -     DULoxetine (CYMBALTA) 20 MG capsule; Take 2 capsules by mouth Daily.  Dispense: 180 capsule; Refill: 0  -     FLUoxetine (PROzac) 20 MG capsule; Take 3 capsules by mouth Daily.  Dispense: 270 capsule; Refill: 0  -     busPIRone (BUSPAR) 7.5 MG tablet; Take 1 tablet by mouth 3 (Three) Times a Day.  Dispense: 270 tablet; Refill: 1    3. Fatigue, unspecified type  -     CBC & Differential  -     Comprehensive Metabolic Panel  -     Ferritin  -     Vitamin B12 & Folate  -     Vitamin D 25 Hydroxy  -     Iron Profile  -     TSH+Free T4    4. Muscle soreness  -     Magnesium    5. SOB (shortness of breath) on exertion  -     ipratropium (Atrovent HFA) 17 MCG/ACT inhaler; Inhale 2 puffs 2 (Two) Times a Day.  Dispense: 1 each; Refill: 0  -      albuterol sulfate  (90 Base) MCG/ACT inhaler; Inhale 2 puffs Every 4 (Four) Hours As Needed for Wheezing.  Dispense: 6.7 g; Refill: 0    6. Lumbar spondylosis  -     meloxicam (MOBIC) 15 MG tablet; Take 1 tablet by mouth Daily.  Dispense: 90 tablet; Refill: 1        Follow Up   Return in about 3 months (around 4/18/2022), or if symptoms worsen or fail to improve.  Patient was given instructions and counseling regarding her condition or for health maintenance advice. Please see specific information pulled into the AVS if appropriate.

## 2022-01-18 NOTE — PROGRESS NOTES
Venipuncture Blood Specimen Collection  Venipuncture performed in left arm by Zakia Lang with good hemostasis. Patient tolerated the procedure well without complications.   01/18/22   Zakia Lang

## 2022-01-19 RX ORDER — FOLIC ACID 1 MG/1
1 TABLET ORAL DAILY
Qty: 90 TABLET | Refills: 1 | Status: SHIPPED | OUTPATIENT
Start: 2022-01-19 | End: 2022-08-26 | Stop reason: SDUPTHER

## 2022-01-19 RX ORDER — ERGOCALCIFEROL 1.25 MG/1
50000 CAPSULE ORAL WEEKLY
Qty: 12 CAPSULE | Refills: 0 | Status: SHIPPED | OUTPATIENT
Start: 2022-01-19 | End: 2022-08-30 | Stop reason: SDUPTHER

## 2022-01-27 ENCOUNTER — CLINICAL SUPPORT (OUTPATIENT)
Dept: FAMILY MEDICINE CLINIC | Facility: CLINIC | Age: 37
End: 2022-01-27

## 2022-01-27 DIAGNOSIS — Z20.828 EXPOSURE TO SARS-ASSOCIATED CORONAVIRUS: Primary | ICD-10-CM

## 2022-01-27 PROCEDURE — 99211 OFF/OP EST MAY X REQ PHY/QHP: CPT | Performed by: FAMILY MEDICINE

## 2022-01-27 PROCEDURE — U0004 COV-19 TEST NON-CDC HGH THRU: HCPCS | Performed by: FAMILY MEDICINE

## 2022-01-28 LAB — SARS-COV-2 RNA PNL SPEC NAA+PROBE: DETECTED

## 2022-05-06 DIAGNOSIS — F32.9 MAJOR DEPRESSIVE DISORDER, SINGLE EPISODE, UNSPECIFIED: ICD-10-CM

## 2022-05-06 RX ORDER — FLUOXETINE HYDROCHLORIDE 20 MG/1
CAPSULE ORAL
Qty: 90 CAPSULE | Refills: 0 | Status: SHIPPED | OUTPATIENT
Start: 2022-05-06 | End: 2022-08-26 | Stop reason: SDUPTHER

## 2022-05-06 RX ORDER — DULOXETIN HYDROCHLORIDE 20 MG/1
CAPSULE, DELAYED RELEASE ORAL
Qty: 60 CAPSULE | Refills: 0 | Status: SHIPPED | OUTPATIENT
Start: 2022-05-06 | End: 2022-08-26 | Stop reason: SDUPTHER

## 2022-07-06 DIAGNOSIS — F32.9 MAJOR DEPRESSIVE DISORDER, SINGLE EPISODE, UNSPECIFIED: ICD-10-CM

## 2022-07-06 RX ORDER — DULOXETIN HYDROCHLORIDE 20 MG/1
CAPSULE, DELAYED RELEASE ORAL
Qty: 60 CAPSULE | Refills: 0 | OUTPATIENT
Start: 2022-07-06

## 2022-08-04 DIAGNOSIS — F32.9 MAJOR DEPRESSIVE DISORDER, SINGLE EPISODE, UNSPECIFIED: ICD-10-CM

## 2022-08-04 RX ORDER — FLUOXETINE HYDROCHLORIDE 20 MG/1
CAPSULE ORAL
Qty: 90 CAPSULE | Refills: 0 | OUTPATIENT
Start: 2022-08-04

## 2022-08-26 ENCOUNTER — OFFICE VISIT (OUTPATIENT)
Dept: FAMILY MEDICINE CLINIC | Facility: CLINIC | Age: 37
End: 2022-08-26

## 2022-08-26 VITALS
DIASTOLIC BLOOD PRESSURE: 84 MMHG | WEIGHT: 161 LBS | SYSTOLIC BLOOD PRESSURE: 120 MMHG | HEIGHT: 64 IN | BODY MASS INDEX: 27.49 KG/M2 | OXYGEN SATURATION: 98 % | HEART RATE: 71 BPM | TEMPERATURE: 97.3 F

## 2022-08-26 DIAGNOSIS — I10 HYPERTENSION, UNSPECIFIED TYPE: ICD-10-CM

## 2022-08-26 DIAGNOSIS — F32.9 MAJOR DEPRESSIVE DISORDER, SINGLE EPISODE, UNSPECIFIED: ICD-10-CM

## 2022-08-26 DIAGNOSIS — E55.9 VITAMIN D INSUFFICIENCY: ICD-10-CM

## 2022-08-26 DIAGNOSIS — M47.816 LUMBAR SPONDYLOSIS: ICD-10-CM

## 2022-08-26 DIAGNOSIS — Z87.898 HISTORY OF NECK SWELLING: Primary | ICD-10-CM

## 2022-08-26 DIAGNOSIS — E53.8 FOLATE DEFICIENCY: ICD-10-CM

## 2022-08-26 DIAGNOSIS — Z11.59 NEED FOR HEPATITIS C SCREENING TEST: ICD-10-CM

## 2022-08-26 LAB
25(OH)D3 SERPL-MCNC: 24.3 NG/ML (ref 30–100)
ALBUMIN SERPL-MCNC: 4.5 G/DL (ref 3.5–5.2)
ALBUMIN/GLOB SERPL: 1.9 G/DL
ALP SERPL-CCNC: 85 U/L (ref 39–117)
ALT SERPL W P-5'-P-CCNC: 16 U/L (ref 1–33)
ANION GAP SERPL CALCULATED.3IONS-SCNC: 11 MMOL/L (ref 5–15)
AST SERPL-CCNC: 19 U/L (ref 1–32)
BASOPHILS # BLD AUTO: 0.05 10*3/MM3 (ref 0–0.2)
BASOPHILS NFR BLD AUTO: 0.4 % (ref 0–1.5)
BILIRUB SERPL-MCNC: 0.3 MG/DL (ref 0–1.2)
BUN SERPL-MCNC: 9 MG/DL (ref 6–20)
BUN/CREAT SERPL: 14.8 (ref 7–25)
CALCIUM SPEC-SCNC: 9.5 MG/DL (ref 8.6–10.5)
CHLORIDE SERPL-SCNC: 101 MMOL/L (ref 98–107)
CHOLEST SERPL-MCNC: 175 MG/DL (ref 0–200)
CO2 SERPL-SCNC: 24 MMOL/L (ref 22–29)
CREAT SERPL-MCNC: 0.61 MG/DL (ref 0.57–1)
DEPRECATED RDW RBC AUTO: 41.8 FL (ref 37–54)
EGFRCR SERPLBLD CKD-EPI 2021: 119 ML/MIN/1.73
EOSINOPHIL # BLD AUTO: 0.29 10*3/MM3 (ref 0–0.4)
EOSINOPHIL NFR BLD AUTO: 2.4 % (ref 0.3–6.2)
ERYTHROCYTE [DISTWIDTH] IN BLOOD BY AUTOMATED COUNT: 12.9 % (ref 12.3–15.4)
FOLATE SERPL-MCNC: 10.8 NG/ML (ref 4.78–24.2)
GLOBULIN UR ELPH-MCNC: 2.4 GM/DL
GLUCOSE SERPL-MCNC: 96 MG/DL (ref 65–99)
HCT VFR BLD AUTO: 42.1 % (ref 34–46.6)
HCV AB SER DONR QL: NORMAL
HDLC SERPL-MCNC: 30 MG/DL (ref 40–60)
HGB BLD-MCNC: 13.9 G/DL (ref 12–15.9)
IMM GRANULOCYTES # BLD AUTO: 0.04 10*3/MM3 (ref 0–0.05)
IMM GRANULOCYTES NFR BLD AUTO: 0.3 % (ref 0–0.5)
LDLC SERPL CALC-MCNC: 106 MG/DL (ref 0–100)
LDLC/HDLC SERPL: 3.35 {RATIO}
LYMPHOCYTES # BLD AUTO: 3.86 10*3/MM3 (ref 0.7–3.1)
LYMPHOCYTES NFR BLD AUTO: 31.8 % (ref 19.6–45.3)
MCH RBC QN AUTO: 29.9 PG (ref 26.6–33)
MCHC RBC AUTO-ENTMCNC: 33 G/DL (ref 31.5–35.7)
MCV RBC AUTO: 90.5 FL (ref 79–97)
MONOCYTES # BLD AUTO: 0.59 10*3/MM3 (ref 0.1–0.9)
MONOCYTES NFR BLD AUTO: 4.9 % (ref 5–12)
NEUTROPHILS NFR BLD AUTO: 60.2 % (ref 42.7–76)
NEUTROPHILS NFR BLD AUTO: 7.29 10*3/MM3 (ref 1.7–7)
NRBC BLD AUTO-RTO: 0 /100 WBC (ref 0–0.2)
PLATELET # BLD AUTO: 226 10*3/MM3 (ref 140–450)
PMV BLD AUTO: 10.7 FL (ref 6–12)
POTASSIUM SERPL-SCNC: 3.7 MMOL/L (ref 3.5–5.2)
PROT SERPL-MCNC: 6.9 G/DL (ref 6–8.5)
RBC # BLD AUTO: 4.65 10*6/MM3 (ref 3.77–5.28)
SODIUM SERPL-SCNC: 136 MMOL/L (ref 136–145)
TRIGL SERPL-MCNC: 223 MG/DL (ref 0–150)
TSH SERPL DL<=0.05 MIU/L-ACNC: 2.22 UIU/ML (ref 0.27–4.2)
VIT B12 BLD-MCNC: 471 PG/ML (ref 211–946)
VLDLC SERPL-MCNC: 39 MG/DL (ref 5–40)
WBC NRBC COR # BLD: 12.12 10*3/MM3 (ref 3.4–10.8)

## 2022-08-26 PROCEDURE — 86376 MICROSOMAL ANTIBODY EACH: CPT | Performed by: NURSE PRACTITIONER

## 2022-08-26 PROCEDURE — 82746 ASSAY OF FOLIC ACID SERUM: CPT | Performed by: NURSE PRACTITIONER

## 2022-08-26 PROCEDURE — 80061 LIPID PANEL: CPT | Performed by: NURSE PRACTITIONER

## 2022-08-26 PROCEDURE — 80053 COMPREHEN METABOLIC PANEL: CPT | Performed by: NURSE PRACTITIONER

## 2022-08-26 PROCEDURE — 85025 COMPLETE CBC W/AUTO DIFF WBC: CPT | Performed by: NURSE PRACTITIONER

## 2022-08-26 PROCEDURE — 86803 HEPATITIS C AB TEST: CPT | Performed by: NURSE PRACTITIONER

## 2022-08-26 PROCEDURE — 99214 OFFICE O/P EST MOD 30 MIN: CPT | Performed by: NURSE PRACTITIONER

## 2022-08-26 PROCEDURE — 82607 VITAMIN B-12: CPT | Performed by: NURSE PRACTITIONER

## 2022-08-26 PROCEDURE — 86800 THYROGLOBULIN ANTIBODY: CPT | Performed by: NURSE PRACTITIONER

## 2022-08-26 PROCEDURE — 82306 VITAMIN D 25 HYDROXY: CPT | Performed by: NURSE PRACTITIONER

## 2022-08-26 PROCEDURE — 84443 ASSAY THYROID STIM HORMONE: CPT | Performed by: NURSE PRACTITIONER

## 2022-08-26 RX ORDER — MELOXICAM 15 MG/1
15 TABLET ORAL DAILY
Qty: 90 TABLET | Refills: 1 | Status: SHIPPED | OUTPATIENT
Start: 2022-08-26

## 2022-08-26 RX ORDER — DULOXETIN HYDROCHLORIDE 20 MG/1
40 CAPSULE, DELAYED RELEASE ORAL DAILY
Qty: 60 CAPSULE | Refills: 5 | Status: SHIPPED | OUTPATIENT
Start: 2022-08-26

## 2022-08-26 RX ORDER — FOLIC ACID 1 MG/1
1 TABLET ORAL DAILY
Qty: 90 TABLET | Refills: 1 | Status: SHIPPED | OUTPATIENT
Start: 2022-08-26

## 2022-08-26 RX ORDER — BUSPIRONE HYDROCHLORIDE 7.5 MG/1
7.5 TABLET ORAL 3 TIMES DAILY
Qty: 270 TABLET | Refills: 1 | Status: SHIPPED | OUTPATIENT
Start: 2022-08-26

## 2022-08-26 RX ORDER — PREDNISONE 5 MG/1
TABLET ORAL
Qty: 1 EACH | Refills: 0 | Status: SHIPPED | OUTPATIENT
Start: 2022-08-26 | End: 2023-01-11

## 2022-08-26 RX ORDER — HYDROCODONE BITARTRATE AND ACETAMINOPHEN 7.5; 325 MG/1; MG/1
1 TABLET ORAL EVERY 8 HOURS PRN
COMMUNITY
Start: 2022-08-10

## 2022-08-26 RX ORDER — LISINOPRIL 10 MG/1
10 TABLET ORAL DAILY
Qty: 90 TABLET | Refills: 1 | Status: SHIPPED | OUTPATIENT
Start: 2022-08-26

## 2022-08-26 RX ORDER — GABAPENTIN 300 MG/1
300 CAPSULE ORAL 3 TIMES DAILY
COMMUNITY
Start: 2022-08-10

## 2022-08-26 RX ORDER — FLUOXETINE HYDROCHLORIDE 20 MG/1
60 CAPSULE ORAL DAILY
Qty: 90 CAPSULE | Refills: 5 | Status: SHIPPED | OUTPATIENT
Start: 2022-08-26

## 2022-08-26 NOTE — PROGRESS NOTES
Venipuncture Blood Specimen Collection  Venipuncture performed in left arm by Zakia Lang with good hemostasis. Patient tolerated the procedure well without complications.   08/26/22   Zakia Lang

## 2022-08-26 NOTE — PROGRESS NOTES
Chief Complaint  Neck Pain (Neck swollen just below ears around to the front, started Tuesday) and Depression (refills)    Subjective        Past Medical History:   Diagnosis Date   • Anxiety and depression    • Breast infection    • Insomnia      Social History     Tobacco Use   • Smoking status: Current Every Day Smoker     Packs/day: 1.00     Years: 15.00     Pack years: 15.00     Start date: 10/25/2002   • Smokeless tobacco: Never Used   • Tobacco comment: CURRENT EVERY DAY SMOKE, SMOKES LESS THAN 1  PACK PER DAY , FOR 15 YEARS , NEVER USES OTHER TOBACCO PRODUCTS    Vaping Use   • Vaping Use: Never used   Substance Use Topics   • Alcohol use: Never   • Drug use: Never      Current Outpatient Medications on File Prior to Visit   Medication Sig   • albuterol (ACCUNEB) 1.25 MG/3ML nebulizer solution Take 3 mL by nebulization Every 6 (Six) Hours As Needed for Wheezing.   • albuterol sulfate  (90 Base) MCG/ACT inhaler Inhale 2 puffs Every 4 (Four) Hours As Needed for Wheezing.   • gabapentin (NEURONTIN) 300 MG capsule Take 300 mg by mouth 3 (Three) Times a Day.   • HYDROcodone-acetaminophen (NORCO) 7.5-325 MG per tablet Take 1 tablet by mouth Every 8 (Eight) Hours As Needed. for pain   • ipratropium (Atrovent HFA) 17 MCG/ACT inhaler Inhale 2 puffs 2 (Two) Times a Day.   • [DISCONTINUED] busPIRone (BUSPAR) 7.5 MG tablet Take 1 tablet by mouth 3 (Three) Times a Day.   • [DISCONTINUED] DULoxetine (CYMBALTA) 20 MG capsule TAKE TWO CAPSULES BY MOUTH EVERY DAY   • [DISCONTINUED] FLUoxetine (PROzac) 20 MG capsule TAKE 3 CAPSULES BY MOUTH EVERY DAY   • [DISCONTINUED] folic acid (FOLVITE) 1 MG tablet Take 1 tablet by mouth Daily.   • [DISCONTINUED] lisinopril (PRINIVIL,ZESTRIL) 10 MG tablet Take 1 tablet by mouth Daily.   • [DISCONTINUED] meloxicam (MOBIC) 15 MG tablet Take 1 tablet by mouth Daily.   • gabapentin (NEURONTIN) 100 MG capsule Take 2 capsules by mouth 3 (Three) Times a Day.   • vitamin D (ERGOCALCIFEROL)  "1.25 MG (75030 UT) capsule capsule Take 1 capsule by mouth 1 (One) Time Per Week.   • [DISCONTINUED] HYDROcodone-acetaminophen (NORCO) 5-325 MG per tablet Take 1 tablet by mouth Daily.     No current facility-administered medications on file prior to visit.      No Known Allergies   Health Maintenance Due   Topic Date Due   • COVID-19 Vaccine (1) Never done   • ANNUAL PHYSICAL  Never done   • Pneumococcal Vaccine 0-64 (1 - PCV) Never done   • HEPATITIS C SCREENING  Never done   • PAP SMEAR  Never done      Crystal Mast presents to [facility]  Here with swelling of the neck; feels like she has a sour piece of candy in her mouth when eating; neck feels swollen and tight. Denies sinus drainage or congestion. Denies fever, cough, chills. Denies nausea, vomiting or diarrhea. Pt does note some ear pain when swollen. Swelling is behind bilateral ears and down into the neck. Does not notice any swollen lymph nodes.     Depression: Pt states she was doing well on the medication and symptoms were improving until she ran out of medication. 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 Pt states she passes her mother's cross going to work and on her way home and will find herself crying on the way home.     Hypertension: Stable on lisinopril.    Chronic back pain uses meloxicam and is established with pain management.    Vitamin D insufficiency: Patient states she has finished a round of high-dose vitamin D.      Objective   Vital Signs:   /84 (BP Location: Left arm)   Pulse 71   Temp 97.3 °F (36.3 °C)   Ht 162.6 cm (64\")   Wt 73 kg (161 lb)   SpO2 98%   BMI 27.64 kg/m²     Review of Systems   Physical Exam  Vitals reviewed.   Constitutional:       General: She is not in acute distress.     Appearance: Normal appearance. She is well-developed.   HENT:      Head: Normocephalic and atraumatic.      Right Ear: Tympanic membrane, ear canal and " external ear normal.      Left Ear: Tympanic membrane, ear canal and external ear normal.      Mouth/Throat:      Mouth: Mucous membranes are moist.      Dentition: Dental caries present. No dental abscesses.      Pharynx: No oropharyngeal exudate or posterior oropharyngeal erythema.   Eyes:      Conjunctiva/sclera: Conjunctivae normal.      Pupils: Pupils are equal, round, and reactive to light.   Cardiovascular:      Rate and Rhythm: Normal rate and regular rhythm.      Heart sounds: Normal heart sounds.   Pulmonary:      Effort: Pulmonary effort is normal.      Breath sounds: Normal breath sounds.   Musculoskeletal:      Cervical back: Neck supple. Tenderness present.      Right lower leg: No edema.      Left lower leg: No edema.   Skin:     General: Skin is warm and dry.   Neurological:      Mental Status: She is alert and oriented to person, place, and time.      Cranial Nerves: No cranial nerve deficit.   Psychiatric:         Mood and Affect: Mood and affect normal.         Behavior: Behavior normal.         Thought Content: Thought content normal.         Judgment: Judgment normal.        Result Review :                 Assessment and Plan    Diagnoses and all orders for this visit:    1. History of neck swelling (Primary)  -     CBC Auto Differential  -     Comprehensive Metabolic Panel  -     TSH Rfx On Abnormal To Free T4  -     Thyroid Antibodies  -     US Head Neck Soft Tissue; Future  -     predniSONE 5 MG (21) tablet therapy pack dose pack; Take as directed on package instructions.  Dispense: 1 each; Refill: 0    2. Major depressive disorder, single episode, unspecified  -     DULoxetine (CYMBALTA) 20 MG capsule; Take 2 capsules by mouth Daily.  Dispense: 60 capsule; Refill: 5  -     FLUoxetine (PROzac) 20 MG capsule; Take 3 capsules by mouth Daily.  Dispense: 90 capsule; Refill: 5  -     busPIRone (BUSPAR) 7.5 MG tablet; Take 1 tablet by mouth 3 (Three) Times a Day.  Dispense: 270 tablet; Refill:  1    3. Hypertension, unspecified type  -     lisinopril (PRINIVIL,ZESTRIL) 10 MG tablet; Take 1 tablet by mouth Daily.  Dispense: 90 tablet; Refill: 1  -     Lipid Panel    4. Lumbar spondylosis  -     meloxicam (MOBIC) 15 MG tablet; Take 1 tablet by mouth Daily.  Dispense: 90 tablet; Refill: 1    5. Folate deficiency  -     folic acid (FOLVITE) 1 MG tablet; Take 1 tablet by mouth Daily.  Dispense: 90 tablet; Refill: 1  -     Vitamin B12 & Folate    6. Need for hepatitis C screening test  -     Hepatitis C Antibody    7. Vitamin D insufficiency  -     Vitamin D 25 Hydroxy        Follow Up   Return in about 6 months (around 2/26/2023), or if symptoms worsen or fail to improve, for Refills and fasting labs.  Patient was given instructions and counseling regarding her condition or for health maintenance advice. Please see specific information pulled into the AVS if appropriate.

## 2022-08-29 LAB
THYROGLOB AB SERPL-ACNC: <1 IU/ML (ref 0–0.9)
THYROPEROXIDASE AB SERPL-ACNC: <8 IU/ML (ref 0–34)

## 2022-08-30 DIAGNOSIS — E55.9 VITAMIN D INSUFFICIENCY: ICD-10-CM

## 2022-08-30 RX ORDER — ERGOCALCIFEROL 1.25 MG/1
50000 CAPSULE ORAL WEEKLY
Qty: 12 CAPSULE | Refills: 0 | Status: SHIPPED | OUTPATIENT
Start: 2022-08-30

## 2022-09-06 ENCOUNTER — APPOINTMENT (OUTPATIENT)
Dept: ULTRASOUND IMAGING | Facility: HOSPITAL | Age: 37
End: 2022-09-06

## 2023-01-11 ENCOUNTER — OFFICE VISIT (OUTPATIENT)
Dept: FAMILY MEDICINE CLINIC | Facility: CLINIC | Age: 38
End: 2023-01-11
Payer: COMMERCIAL

## 2023-01-11 VITALS
TEMPERATURE: 97.3 F | OXYGEN SATURATION: 98 % | DIASTOLIC BLOOD PRESSURE: 70 MMHG | HEART RATE: 80 BPM | SYSTOLIC BLOOD PRESSURE: 124 MMHG | WEIGHT: 159 LBS | BODY MASS INDEX: 27.14 KG/M2 | HEIGHT: 64 IN

## 2023-01-11 DIAGNOSIS — L98.9 SKIN LESION: Primary | ICD-10-CM

## 2023-01-11 PROBLEM — G47.00 INSOMNIA: Status: ACTIVE | Noted: 2023-01-11

## 2023-01-11 PROBLEM — M72.2 PLANTAR FASCIAL FIBROMATOSIS: Status: ACTIVE | Noted: 2018-09-19

## 2023-01-11 PROBLEM — M06.9 RHEUMATOID ARTHRITIS, UNSPECIFIED (HCC): Status: ACTIVE | Noted: 2021-03-02

## 2023-01-11 PROCEDURE — 99213 OFFICE O/P EST LOW 20 MIN: CPT | Performed by: FAMILY MEDICINE

## 2023-01-11 RX ORDER — TRIAMCINOLONE ACETONIDE 5 MG/G
1 CREAM TOPICAL 2 TIMES DAILY
Qty: 15 G | Refills: 0 | Status: SHIPPED | OUTPATIENT
Start: 2023-01-11 | End: 2023-01-16

## 2023-01-11 RX ORDER — CLOTRIMAZOLE 1 %
1 CREAM (GRAM) TOPICAL 2 TIMES DAILY
Qty: 28 G | Refills: 0 | Status: SHIPPED | OUTPATIENT
Start: 2023-01-11 | End: 2023-01-16

## 2023-01-11 NOTE — PROGRESS NOTES
"Chief Complaint    Ringworm (Possible ringworm on right side of neck)    Subjective      Crystal Mast presents to [facility]    History of Present Illness    1.) SKIN LESION : Onset - > 24 hours. Location - lesion located on medial right UE - onset was several days ago - patient reports a circular lesion - reports concern for ring worm. Lesion located on right lateral aspect of cervical region - burning - onset over 24 hour period - erythematous.     Objective     Vital Signs:     /70 (BP Location: Left arm, Patient Position: Sitting, Cuff Size: Adult)   Pulse 80   Temp 97.3 °F (36.3 °C) (Temporal)   Ht 162.6 cm (64\")   Wt 72.1 kg (159 lb)   SpO2 98%   BMI 27.29 kg/m²       Physical Exam  Vitals reviewed.   Constitutional:       General: She is not in acute distress.     Appearance: Normal appearance. She is well-developed.   HENT:      Head: Normocephalic and atraumatic.      Right Ear: Hearing and external ear normal.      Left Ear: Hearing and external ear normal.      Nose: Nose normal.   Eyes:      General: Lids are normal.         Right eye: No discharge.         Left eye: No discharge.      Conjunctiva/sclera: Conjunctivae normal.   Pulmonary:      Effort: Pulmonary effort is normal.   Abdominal:      General: There is no distension.   Musculoskeletal:         General: No swelling.      Cervical back: Neck supple.   Skin:     Coloration: Skin is not jaundiced.      Findings: No erythema.             Comments: Area of erythema - (+) papules.    Neurological:      Mental Status: She is alert. Mental status is at baseline.   Psychiatric:         Mood and Affect: Mood and affect normal.         Thought Content: Thought content normal.       Assessment and Plan     Diagnoses and all orders for this visit:    1. Skin lesion (Primary)  Comments:  Suspect exzema-like lesion of cervical lesion. Will treat with topical steroid as noted. Will treat lower ext lesion with " topical anti-fungal.       Follow Up : As needed.     Patient was given instructions and counseling regarding her condition or for health maintenance advice. Please see specific information pulled into the AVS if appropriate.

## 2023-07-31 ENCOUNTER — TRANSCRIBE ORDERS (OUTPATIENT)
Dept: ADMINISTRATIVE | Facility: HOSPITAL | Age: 38
End: 2023-07-31
Payer: COMMERCIAL

## 2023-07-31 ENCOUNTER — HOSPITAL ENCOUNTER (OUTPATIENT)
Dept: GENERAL RADIOLOGY | Facility: HOSPITAL | Age: 38
Discharge: HOME | End: 2023-07-31
Admitting: INTERNAL MEDICINE
Payer: COMMERCIAL

## 2023-07-31 DIAGNOSIS — M25.561 CHRONIC PAIN OF RIGHT KNEE: ICD-10-CM

## 2023-07-31 DIAGNOSIS — M25.561 CHRONIC PAIN OF RIGHT KNEE: Primary | ICD-10-CM

## 2023-07-31 DIAGNOSIS — G89.29 CHRONIC PAIN OF RIGHT KNEE: ICD-10-CM

## 2023-07-31 DIAGNOSIS — G89.29 CHRONIC PAIN OF RIGHT KNEE: Primary | ICD-10-CM

## 2023-07-31 PROCEDURE — 73560 X-RAY EXAM OF KNEE 1 OR 2: CPT

## 2023-10-09 ENCOUNTER — TELEPHONE (OUTPATIENT)
Dept: FAMILY MEDICINE CLINIC | Facility: CLINIC | Age: 38
End: 2023-10-09

## 2023-10-09 NOTE — TELEPHONE ENCOUNTER
This encounter is being sent as an FYI.   Patient was scheduled for annual wellness visit with an APC who is not the patient’s PCP.   Please review and follow up with the patient in the event patient should be worked in with the PCP.    VISIT CURRENTLY SCHEDULED WITH: PHYLLIS MARKS  DATE OF SCHEDULED VISIT: 10.19.2023    PATIENT REQUESTED THAT SHE SEE PHYLLIS MARKS FOR HER APPOINTMENT

## 2023-10-25 ENCOUNTER — OFFICE VISIT (OUTPATIENT)
Dept: FAMILY MEDICINE CLINIC | Facility: CLINIC | Age: 38
End: 2023-10-25
Payer: COMMERCIAL

## 2023-10-25 VITALS
HEART RATE: 84 BPM | DIASTOLIC BLOOD PRESSURE: 70 MMHG | WEIGHT: 164 LBS | SYSTOLIC BLOOD PRESSURE: 124 MMHG | OXYGEN SATURATION: 98 % | BODY MASS INDEX: 28 KG/M2 | HEIGHT: 64 IN | TEMPERATURE: 96.9 F

## 2023-10-25 DIAGNOSIS — E55.9 VITAMIN D DEFICIENCY: ICD-10-CM

## 2023-10-25 DIAGNOSIS — R10.2 RIGHT ADNEXAL TENDERNESS: ICD-10-CM

## 2023-10-25 DIAGNOSIS — F32.9 MAJOR DEPRESSIVE DISORDER, SINGLE EPISODE, UNSPECIFIED: ICD-10-CM

## 2023-10-25 DIAGNOSIS — R10.13 EPIGASTRIC PAIN: ICD-10-CM

## 2023-10-25 DIAGNOSIS — F32.A ANXIETY AND DEPRESSION: ICD-10-CM

## 2023-10-25 DIAGNOSIS — M79.7 FIBROMYALGIA: ICD-10-CM

## 2023-10-25 DIAGNOSIS — R11.0 NAUSEA: ICD-10-CM

## 2023-10-25 DIAGNOSIS — Z71.85 VACCINE COUNSELING: ICD-10-CM

## 2023-10-25 DIAGNOSIS — F41.9 ANXIETY AND DEPRESSION: ICD-10-CM

## 2023-10-25 DIAGNOSIS — E53.8 FOLIC ACID DEFICIENCY: ICD-10-CM

## 2023-10-25 DIAGNOSIS — R31.9 HEMATURIA, UNSPECIFIED TYPE: ICD-10-CM

## 2023-10-25 DIAGNOSIS — Z01.419 WELL FEMALE EXAM WITH ROUTINE GYNECOLOGICAL EXAM: Primary | ICD-10-CM

## 2023-10-25 PROBLEM — F33.0 MILD EPISODE OF RECURRENT MAJOR DEPRESSIVE DISORDER: Chronic | Status: ACTIVE | Noted: 2023-03-01

## 2023-10-25 PROBLEM — J45.909 ASTHMA: Status: ACTIVE | Noted: 2023-03-01

## 2023-10-25 PROBLEM — I10 ESSENTIAL HYPERTENSION: Status: ACTIVE | Noted: 2023-03-01

## 2023-10-25 LAB
BASOPHILS # BLD AUTO: 0.05 10*3/MM3 (ref 0–0.2)
BASOPHILS NFR BLD AUTO: 0.5 % (ref 0–1.5)
BILIRUB BLD-MCNC: NEGATIVE MG/DL
CLARITY, POC: CLEAR
COLOR UR: ABNORMAL
DEPRECATED RDW RBC AUTO: 43.7 FL (ref 37–54)
EOSINOPHIL # BLD AUTO: 0.22 10*3/MM3 (ref 0–0.4)
EOSINOPHIL NFR BLD AUTO: 2.1 % (ref 0.3–6.2)
ERYTHROCYTE [DISTWIDTH] IN BLOOD BY AUTOMATED COUNT: 13.3 % (ref 12.3–15.4)
EXPIRATION DATE: ABNORMAL
GLUCOSE UR STRIP-MCNC: NEGATIVE MG/DL
HAV IGM SERPL QL IA: NORMAL
HBV CORE IGM SERPL QL IA: NORMAL
HBV SURFACE AG SERPL QL IA: NORMAL
HCT VFR BLD AUTO: 44.9 % (ref 34–46.6)
HCV AB SER DONR QL: NORMAL
HGB BLD-MCNC: 15.1 G/DL (ref 12–15.9)
IMM GRANULOCYTES # BLD AUTO: 0.04 10*3/MM3 (ref 0–0.05)
IMM GRANULOCYTES NFR BLD AUTO: 0.4 % (ref 0–0.5)
KETONES UR QL: NEGATIVE
LEUKOCYTE EST, POC: NEGATIVE
LYMPHOCYTES # BLD AUTO: 3.59 10*3/MM3 (ref 0.7–3.1)
LYMPHOCYTES NFR BLD AUTO: 34.7 % (ref 19.6–45.3)
Lab: ABNORMAL
MCH RBC QN AUTO: 30.1 PG (ref 26.6–33)
MCHC RBC AUTO-ENTMCNC: 33.6 G/DL (ref 31.5–35.7)
MCV RBC AUTO: 89.6 FL (ref 79–97)
MONOCYTES # BLD AUTO: 0.65 10*3/MM3 (ref 0.1–0.9)
MONOCYTES NFR BLD AUTO: 6.3 % (ref 5–12)
NEUTROPHILS NFR BLD AUTO: 5.8 10*3/MM3 (ref 1.7–7)
NEUTROPHILS NFR BLD AUTO: 56 % (ref 42.7–76)
NITRITE UR-MCNC: NEGATIVE MG/ML
NRBC BLD AUTO-RTO: 0 /100 WBC (ref 0–0.2)
PH UR: 5.5 [PH] (ref 5–8)
PLATELET # BLD AUTO: 287 10*3/MM3 (ref 140–450)
PMV BLD AUTO: 10.3 FL (ref 6–12)
PROT UR STRIP-MCNC: NEGATIVE MG/DL
RBC # BLD AUTO: 5.01 10*6/MM3 (ref 3.77–5.28)
RBC # UR STRIP: ABNORMAL /UL
SP GR UR: 1.02 (ref 1–1.03)
UROBILINOGEN UR QL: ABNORMAL
WBC NRBC COR # BLD: 10.35 10*3/MM3 (ref 3.4–10.8)

## 2023-10-25 PROCEDURE — 80074 ACUTE HEPATITIS PANEL: CPT | Performed by: NURSE PRACTITIONER

## 2023-10-25 PROCEDURE — 80053 COMPREHEN METABOLIC PANEL: CPT | Performed by: NURSE PRACTITIONER

## 2023-10-25 PROCEDURE — 87624 HPV HI-RISK TYP POOLED RSLT: CPT | Performed by: NURSE PRACTITIONER

## 2023-10-25 PROCEDURE — 86695 HERPES SIMPLEX TYPE 1 TEST: CPT | Performed by: NURSE PRACTITIONER

## 2023-10-25 PROCEDURE — 82306 VITAMIN D 25 HYDROXY: CPT | Performed by: NURSE PRACTITIONER

## 2023-10-25 PROCEDURE — 82746 ASSAY OF FOLIC ACID SERUM: CPT | Performed by: NURSE PRACTITIONER

## 2023-10-25 PROCEDURE — 87591 N.GONORRHOEAE DNA AMP PROB: CPT | Performed by: NURSE PRACTITIONER

## 2023-10-25 PROCEDURE — 85025 COMPLETE CBC W/AUTO DIFF WBC: CPT | Performed by: NURSE PRACTITIONER

## 2023-10-25 PROCEDURE — G0123 SCREEN CERV/VAG THIN LAYER: HCPCS | Performed by: NURSE PRACTITIONER

## 2023-10-25 PROCEDURE — 86696 HERPES SIMPLEX TYPE 2 TEST: CPT | Performed by: NURSE PRACTITIONER

## 2023-10-25 PROCEDURE — 82607 VITAMIN B-12: CPT | Performed by: NURSE PRACTITIONER

## 2023-10-25 PROCEDURE — G0432 EIA HIV-1/HIV-2 SCREEN: HCPCS | Performed by: NURSE PRACTITIONER

## 2023-10-25 PROCEDURE — 84443 ASSAY THYROID STIM HORMONE: CPT | Performed by: NURSE PRACTITIONER

## 2023-10-25 PROCEDURE — 87086 URINE CULTURE/COLONY COUNT: CPT | Performed by: NURSE PRACTITIONER

## 2023-10-25 PROCEDURE — 87491 CHLMYD TRACH DNA AMP PROBE: CPT | Performed by: NURSE PRACTITIONER

## 2023-10-25 PROCEDURE — 86592 SYPHILIS TEST NON-TREP QUAL: CPT | Performed by: NURSE PRACTITIONER

## 2023-10-25 RX ORDER — DULOXETIN HYDROCHLORIDE 30 MG/1
30 CAPSULE, DELAYED RELEASE ORAL DAILY
Qty: 30 CAPSULE | Refills: 2 | Status: SHIPPED | OUTPATIENT
Start: 2023-10-25

## 2023-10-25 RX ORDER — BUSPIRONE HYDROCHLORIDE 5 MG/1
5 TABLET ORAL 3 TIMES DAILY PRN
Qty: 90 TABLET | Refills: 2 | Status: SHIPPED | OUTPATIENT
Start: 2023-10-25

## 2023-10-25 NOTE — PROGRESS NOTES
..  Venipuncture Blood Specimen Collection  Venipuncture performed in left arm by bharti barron with good hemostasis. Patient tolerated the procedure well without complications.   10/25/23   Bharti Barron

## 2023-10-25 NOTE — PROGRESS NOTES
Chief Complaint  Gynecologic Exam    Subjective            Crystal Mast presents to [facility]  History of Present Illness  Patient is here today for her annual well woman gynocologic/Pap smear exam    Denies having any changes in her breasts and she had prior surgeries where she has had breast reduction    Also is requesting STD testing    Then also does not take vaccines    No periods since ablation 8 yrs ago    Is a smoker and right now under the added stress with her  she is unable to think about or consider quitting smoking at this time    And also pt reports she and her  have been having issues    Then stopped all medications she was on and ould like to restart the buspar for anxiety and the cymbalta for the fibro pain and depression and anxiety and reports otherwise doing very well on the Cymbalta and the buspirone with no SI/HI issues now or then while on the medication and benefited greatly when she was on these medications          PHQ-2 Total Score: 10  PHQ-9 Total Score: 10    Past Medical History:   Diagnosis Date    Anxiety and depression     Breast infection     Insomnia        No Known Allergies     Past Surgical History:   Procedure Laterality Date    BILATERAL BREAST REDUCTION      BREAST RECONSTRUCTION      WITH LATISSIMUS DORSI MYOCUTANEOUS FLAP     SECTION      3 OR MORE    TUBAL ABDOMINAL LIGATION          Social History     Tobacco Use    Smoking status: Every Day     Packs/day: 1.00     Years: 15.00     Additional pack years: 0.00     Total pack years: 15.00     Types: Cigarettes     Start date: 10/25/2002    Smokeless tobacco: Never   Vaping Use    Vaping Use: Never used   Substance Use Topics    Alcohol use: Never    Drug use: Never       Family History   Problem Relation Age of Onset    Hypertension Other     Hypertension Mother     Heart disease Mother     Hypertension Father     Diabetes Father     Kidney disease Father          Health Maintenance Due   Topic Date Due    ANNUAL PHYSICAL  Never done    PAP SMEAR  Never done    BMI FOLLOWUP  10/04/2022        Current Outpatient Medications on File Prior to Visit   Medication Sig    albuterol (ACCUNEB) 1.25 MG/3ML nebulizer solution Take 3 mL by nebulization Every 6 (Six) Hours As Needed for Wheezing.    albuterol sulfate  (90 Base) MCG/ACT inhaler Inhale 2 puffs Every 4 (Four) Hours As Needed for Wheezing.    folic acid (FOLVITE) 1 MG tablet Take 1 tablet by mouth Daily.    gabapentin (NEURONTIN) 300 MG capsule Take 1 capsule by mouth 3 (Three) Times a Day.    HYDROcodone-acetaminophen (NORCO) 7.5-325 MG per tablet Take 1 tablet by mouth Every 8 (Eight) Hours As Needed. for pain    ipratropium (Atrovent HFA) 17 MCG/ACT inhaler Inhale 2 puffs 2 (Two) Times a Day.    meloxicam (MOBIC) 15 MG tablet Take 1 tablet by mouth Daily.    vitamin D (ERGOCALCIFEROL) 1.25 MG (42316 UT) capsule capsule Take 1 capsule by mouth 1 (One) Time Per Week.    [DISCONTINUED] busPIRone (BUSPAR) 7.5 MG tablet Take 1 tablet by mouth 3 (Three) Times a Day.    [DISCONTINUED] DULoxetine (CYMBALTA) 20 MG capsule Take 2 capsules by mouth Daily.    [DISCONTINUED] FLUoxetine (PROzac) 20 MG capsule Take 3 capsules by mouth Daily.    [DISCONTINUED] lisinopril (PRINIVIL,ZESTRIL) 10 MG tablet Take 1 tablet by mouth Daily.     No current facility-administered medications on file prior to visit.       Immunization History   Administered Date(s) Administered    Tdap 09/01/2015       Review of Systems   Constitutional:  Positive for fatigue. Negative for unexpected weight gain and unexpected weight loss.   HENT:  Negative for trouble swallowing.    Eyes:  Negative for blurred vision and double vision.   Respiratory:  Negative for choking and shortness of breath.    Cardiovascular:  Negative for chest pain.   Gastrointestinal:  Positive for abdominal pain. Negative for blood in stool.        Post-prandially --will  "experience upper abd pain and nausea and vomiting at times     Endocrine: Negative for polydipsia, polyphagia and polyuria.   Genitourinary:  Negative for breast discharge, breast lump, breast pain, dysuria, frequency, menstrual problem, urgency, vaginal bleeding and vaginal discharge.        No period x 8 yrs since uterine ablation    Musculoskeletal:  Positive for back pain.   Neurological:  Negative for dizziness, seizures, syncope and light-headedness.   Hematological:  Does not bruise/bleed easily.   Psychiatric/Behavioral:  Positive for depressed mood. Negative for self-injury and suicidal ideas. The patient is nervous/anxious.         Objective     /70 (BP Location: Left arm, Patient Position: Sitting, Cuff Size: Adult)   Pulse 84   Temp 96.9 °F (36.1 °C) (Temporal)   Ht 161.3 cm (63.5\")   Wt 74.4 kg (164 lb)   SpO2 98%   BMI 28.60 kg/m²       Physical Exam  Vitals and nursing note reviewed.   Constitutional:       Appearance: Normal appearance.      Comments: BMI 28.60   HENT:      Head: Normocephalic.      Right Ear: Tympanic membrane, ear canal and external ear normal.      Left Ear: Tympanic membrane, ear canal and external ear normal.      Nose: Nose normal.      Mouth/Throat:      Mouth: Mucous membranes are moist.      Comments: Some dental caries noted  Eyes:      Pupils: Pupils are equal, round, and reactive to light.   Cardiovascular:      Rate and Rhythm: Normal rate and regular rhythm.      Heart sounds: Normal heart sounds.   Pulmonary:      Effort: Pulmonary effort is normal.      Breath sounds: Normal breath sounds.   Chest:   Breasts:     Perfecto Score is 5.      Right: No swelling, bleeding, inverted nipple, nipple discharge, skin change or tenderness.      Left: No swelling, bleeding, inverted nipple, nipple discharge, skin change or tenderness.          Comments: Scars noted  Abdominal:      Palpations: Abdomen is soft.      Tenderness: There is abdominal tenderness in the right " upper quadrant and epigastric area. There is no guarding.      Hernia: There is no hernia in the left inguinal area or right inguinal area.   Genitourinary:     General: Normal vulva.      Exam position: Lithotomy position.      Perfecto stage (genital): 5.      Labia:         Right: No rash, tenderness, lesion or injury.         Left: No rash, tenderness, lesion or injury.       Urethra: No prolapse, urethral pain, urethral swelling or urethral lesion.      Vagina: No signs of injury and foreign body. No vaginal discharge, erythema, tenderness, bleeding, lesions or prolapsed vaginal walls.      Cervix: Discharge present. No cervical motion tenderness, friability, lesion, erythema, cervical bleeding or eversion.      Uterus: Not deviated, not enlarged, not fixed, not tender and no uterine prolapse.       Adnexa:         Right: Tenderness and fullness present.         Left: No mass, tenderness or fullness.        Comments: Mild clearish white discharge and positive right adnexal tenderness and fullness and obtained Pap smear and patient tolerated well  Musculoskeletal:         General: Normal range of motion.      Cervical back: Normal range of motion and neck supple.   Lymphadenopathy:      Upper Body:      Right upper body: No supraclavicular or axillary adenopathy.      Left upper body: No supraclavicular or axillary adenopathy.      Lower Body: No right inguinal adenopathy. No left inguinal adenopathy.   Skin:     General: Skin is warm and dry.   Neurological:      Mental Status: She is alert and oriented to person, place, and time.   Psychiatric:         Mood and Affect: Mood normal.         Behavior: Behavior normal.         Thought Content: Thought content normal.         Judgment: Judgment normal.         Result Review :                      POCT urinalysis dipstick, automated (10/25/2023 13:27)      Assessment and Plan      Diagnoses and all orders for this visit:    1. Well female exam with routine  gynecological exam (Primary)  -     Cancel: IgP, Aptima HPV  -     POCT urinalysis dipstick, automated  -     Hepatitis panel, acute  -     HIV-1/O/2 ANTIGEN/ANTIBODY, 4TH GENERATION  -     HSV 1 and 2-Specific Ab, IgG  -     RPR  -     IGP,CtNg,AptimaHPV,rfx16 / 18,45  -     Vitamin B12 & Folate  -     CBC & Differential  -     Comprehensive Metabolic Panel  -     TSH  -     US Non-ob Transvaginal; Future    2. Epigastric pain  -     US Gallbladder; Future    3. Nausea  -     US Gallbladder; Future    4. Hematuria, unspecified type  -     Hepatitis panel, acute  -     HIV-1/O/2 ANTIGEN/ANTIBODY, 4TH GENERATION  -     HSV 1 and 2-Specific Ab, IgG  -     RPR  -     Urine Culture - Urine, Urine, Clean Catch    5. Major depressive disorder, single episode, unspecified  -     busPIRone (BUSPAR) 5 MG tablet; Take 1 tablet by mouth 3 (Three) Times a Day As Needed (anxiety).  Dispense: 90 tablet; Refill: 2  -     DULoxetine (CYMBALTA) 30 MG capsule; Take 1 capsule by mouth Daily.  Dispense: 30 capsule; Refill: 2  -     CBC & Differential  -     Comprehensive Metabolic Panel  -     TSH    6. Vitamin D deficiency  -     Vitamin D,25-Hydroxy    7. Folic acid deficiency  -     Vitamin B12 & Folate  -     CBC & Differential    8. Anxiety and depression  -     busPIRone (BUSPAR) 5 MG tablet; Take 1 tablet by mouth 3 (Three) Times a Day As Needed (anxiety).  Dispense: 90 tablet; Refill: 2    9. Fibromyalgia  -     busPIRone (BUSPAR) 5 MG tablet; Take 1 tablet by mouth 3 (Three) Times a Day As Needed (anxiety).  Dispense: 90 tablet; Refill: 2  -     DULoxetine (CYMBALTA) 30 MG capsule; Take 1 capsule by mouth Daily.  Dispense: 30 capsule; Refill: 2    10. Vaccine counseling  Comments:  declines all immunizations    11. Right adnexal tenderness  -     US Non-ob Transvaginal; Future      PT ONLY WANTS MYCHART MESSAGE AND NOT A LETTER     Discussed with the patient if persistent hematuria with no infection we will proceed with  referral to urology    Restarting her buspirone and Cymbalta as requested at slightly lower doses as in the past she was on 7.5 mg 3 times daily of the buspirone and a total of 40 mg on the Cymbalta in the past      Follow Up     Return in about 6 weeks (around 12/6/2023), or if symptoms worsen or fail to improve, for Recheck.    Patient was given instructions and counseling regarding her condition or for health maintenance advice. Please see specific information pulled into the AVS if appropriate.     BMI is >= 25 and <30. (Overweight) The following options were offered after discussion;: exercise counseling/recommendations and nutrition counseling/recommendations      Crystal Mast  reports that she has been smoking cigarettes. She started smoking about 21 years ago. She has a 15.00 pack-year smoking history. She has never used smokeless tobacco.. I have educated her on the risk of diseases from using tobacco products such as cancer, COPD, and heart disease.

## 2023-10-26 DIAGNOSIS — E55.9 VITAMIN D DEFICIENCY: Primary | ICD-10-CM

## 2023-10-26 LAB
25(OH)D3 SERPL-MCNC: 20.9 NG/ML (ref 30–100)
ALBUMIN SERPL-MCNC: 4.5 G/DL (ref 3.5–5.2)
ALBUMIN/GLOB SERPL: 1.8 G/DL
ALP SERPL-CCNC: 82 U/L (ref 39–117)
ALT SERPL W P-5'-P-CCNC: 15 U/L (ref 1–33)
ANION GAP SERPL CALCULATED.3IONS-SCNC: 10 MMOL/L (ref 5–15)
AST SERPL-CCNC: 15 U/L (ref 1–32)
BACTERIA SPEC AEROBE CULT: NO GROWTH
BILIRUB SERPL-MCNC: 0.6 MG/DL (ref 0–1.2)
BUN SERPL-MCNC: 15 MG/DL (ref 6–20)
BUN/CREAT SERPL: 22.4 (ref 7–25)
CALCIUM SPEC-SCNC: 9.7 MG/DL (ref 8.6–10.5)
CHLORIDE SERPL-SCNC: 105 MMOL/L (ref 98–107)
CO2 SERPL-SCNC: 25 MMOL/L (ref 22–29)
CREAT SERPL-MCNC: 0.67 MG/DL (ref 0.57–1)
EGFRCR SERPLBLD CKD-EPI 2021: 115.6 ML/MIN/1.73
FOLATE SERPL-MCNC: 5.15 NG/ML (ref 4.78–24.2)
GLOBULIN UR ELPH-MCNC: 2.5 GM/DL
GLUCOSE SERPL-MCNC: 95 MG/DL (ref 65–99)
HIV 1+2 AB+HIV1 P24 AG SERPL QL IA: NORMAL
POTASSIUM SERPL-SCNC: 3.8 MMOL/L (ref 3.5–5.2)
PROT SERPL-MCNC: 7 G/DL (ref 6–8.5)
RPR SER QL: NORMAL
SODIUM SERPL-SCNC: 140 MMOL/L (ref 136–145)
TSH SERPL DL<=0.05 MIU/L-ACNC: 2.23 UIU/ML (ref 0.27–4.2)
VIT B12 BLD-MCNC: 565 PG/ML (ref 211–946)

## 2023-10-26 RX ORDER — ERGOCALCIFEROL 1.25 MG/1
50000 CAPSULE ORAL WEEKLY
Qty: 5 CAPSULE | Refills: 2 | Status: SHIPPED | OUTPATIENT
Start: 2023-10-26

## 2023-10-26 NOTE — PROGRESS NOTES
Patient only wants [facility] messaging and I will send everything through there  My my dictation:    Crystal, thus far it does show your vitamin D was in the low deficient range and I will send in the once weekly high-dose vitamin D2 50,000 IUs x 3 months and then after that we will need you to stop by and get rechecked    Blood counts all normal range and comprehensive panel shows normal glucose and normal kidney and liver function test and normal electrolytes; thyroid levels normal range and vitamin B12 and folic acid levels normal range;     Regarding the STD testing-the RPR for syphilis was negative and the hepatitis panel was negative; and I am still awaiting the herpes simplex virus 1 and 2 and the Pap smear that also was checking for the chlamydia/gonorrhea as well

## 2023-10-26 NOTE — PROGRESS NOTES
Patient prefers MyChart messaging and no letters sent to the home    Crystal thus far the urine culture shows no bacterial growth and if anything changes we will let you know

## 2023-10-27 LAB
C TRACH RRNA CVX QL NAA+PROBE: NEGATIVE
CYTOLOGIST CVX/VAG CYTO: NORMAL
CYTOLOGY CVX/VAG DOC CYTO: NORMAL
CYTOLOGY CVX/VAG DOC THIN PREP: NORMAL
DX ICD CODE: NORMAL
HIV 1 & 2 AB SER-IMP: NORMAL
HPV GENOTYPE REFLEX: NORMAL
HPV I/H RISK 4 DNA CVX QL PROBE+SIG AMP: NEGATIVE
HSV1 IGG SER IA-ACNC: <0.91 INDEX (ref 0–0.9)
HSV2 IGG SER IA-ACNC: 6.03 INDEX (ref 0–0.9)
N GONORRHOEA RRNA CVX QL NAA+PROBE: NEGATIVE
OTHER STN SPEC: NORMAL
STAT OF ADQ CVX/VAG CYTO-IMP: NORMAL

## 2023-10-29 NOTE — PROGRESS NOTES
Patient only wants to receive Tailored rosario Rojas the herpes screen still shows negative for fever blisters and positive for type II herpes which is genital herpes-and we can place you on Valtrex 500 mg daily for suppression and if you have had any rashes or exacerbations at all then we could actually place you on 1 g which is 1000 mg daily for suppression-and if you have any questions please let me know--also please let me know if you would like for me to start you on the Valtrex for suppression; and your Pap smear was negative for any intraepithelial lesion or malignancy of the cervix and also negative for HPV; also negative for chlamydia and gonorrhea;

## 2023-11-16 ENCOUNTER — HOSPITAL ENCOUNTER (OUTPATIENT)
Dept: ULTRASOUND IMAGING | Facility: HOSPITAL | Age: 38
Discharge: HOME | End: 2023-11-16
Admitting: NURSE PRACTITIONER
Payer: COMMERCIAL

## 2023-11-16 ENCOUNTER — HOSPITAL ENCOUNTER (OUTPATIENT)
Dept: ULTRASOUND IMAGING | Facility: HOSPITAL | Age: 38
Discharge: HOME | End: 2023-11-16
Payer: COMMERCIAL

## 2023-11-16 DIAGNOSIS — R11.0 NAUSEA: ICD-10-CM

## 2023-11-16 DIAGNOSIS — Z01.419 WELL FEMALE EXAM WITH ROUTINE GYNECOLOGICAL EXAM: ICD-10-CM

## 2023-11-16 DIAGNOSIS — R10.2 RIGHT ADNEXAL TENDERNESS: ICD-10-CM

## 2023-11-16 DIAGNOSIS — R10.13 EPIGASTRIC PAIN: ICD-10-CM

## 2023-11-16 PROCEDURE — 76830 TRANSVAGINAL US NON-OB: CPT

## 2023-11-16 PROCEDURE — 76705 ECHO EXAM OF ABDOMEN: CPT

## 2023-11-17 ENCOUNTER — TELEPHONE (OUTPATIENT)
Dept: FAMILY MEDICINE CLINIC | Facility: CLINIC | Age: 38
End: 2023-11-17

## 2023-11-17 DIAGNOSIS — K80.20 GALL STONES: Primary | ICD-10-CM

## 2023-11-17 DIAGNOSIS — B00.9 HERPES: ICD-10-CM

## 2023-11-17 NOTE — PROGRESS NOTES
Calista message per patient request    Crystal the gallbladder shows numerous stones within the gallbladder please let me know if you are having persistent symptoms and we can get you evaluated to the or by the general surgeon-then there is also a prominence in the common bile duct that is nonspecific but that if your liver function tests are elevated and bilirubin levels elevated then we could always consider doing an MRCP--and the last labs that we did all of the liver function tests were normal

## 2023-11-17 NOTE — TELEPHONE ENCOUNTER
Caller: Crystal Mast    Relationship: Self    Best call back number: [phone]     What medication are you requesting: VALTREX    If a prescription is needed, what is your preferred pharmacy and phone number: [facility] - [phone] [facility] [phone] [facility]

## 2023-11-17 NOTE — TELEPHONE ENCOUNTER
Caller: Crystal Mast    Relationship: Self    Best call back number: [phone]     What is the medical concern/diagnosis: SURGERY TO REMOVE GALL BLADDER DUE TO GALL STONES    What specialty or service is being requested: SURGERY    What is the office location: [place]

## 2023-11-17 NOTE — PROGRESS NOTES
Sending my chart message per patient request    Crystal the transvaginal ultrasound shows ovaries grossly unremarkable and there aperients and the right ovary has an involuting cyst which is expected when you are ovulating and the endometrial changes they feel as though are likely related to the underlying ablation that you had historically but nothing of concern was seen--any issues persist please follow-up in the office

## 2023-11-19 PROBLEM — K80.20 GALL STONES: Status: ACTIVE | Noted: 2023-11-19

## 2023-11-19 PROBLEM — B00.9 HERPES: Status: ACTIVE | Noted: 2023-11-19

## 2023-11-19 RX ORDER — VALACYCLOVIR HYDROCHLORIDE 1 G/1
1000 TABLET, FILM COATED ORAL DAILY
Qty: 30 TABLET | Refills: 11 | Status: SHIPPED | OUTPATIENT
Start: 2023-11-19

## 2023-11-27 ENCOUNTER — OFFICE VISIT (OUTPATIENT)
Dept: SURGERY | Facility: CLINIC | Age: 38
End: 2023-11-27
Payer: COMMERCIAL

## 2023-11-27 VITALS
DIASTOLIC BLOOD PRESSURE: 81 MMHG | HEIGHT: 64 IN | WEIGHT: 164 LBS | SYSTOLIC BLOOD PRESSURE: 126 MMHG | BODY MASS INDEX: 28 KG/M2

## 2023-11-27 DIAGNOSIS — K21.9 GASTROESOPHAGEAL REFLUX DISEASE, UNSPECIFIED WHETHER ESOPHAGITIS PRESENT: ICD-10-CM

## 2023-11-27 DIAGNOSIS — K80.20 CALCULUS OF GALLBLADDER WITHOUT CHOLECYSTITIS WITHOUT OBSTRUCTION: Primary | ICD-10-CM

## 2023-11-27 PROCEDURE — 1160F RVW MEDS BY RX/DR IN RCRD: CPT | Performed by: STUDENT IN AN ORGANIZED HEALTH CARE EDUCATION/TRAINING PROGRAM

## 2023-11-27 PROCEDURE — 1159F MED LIST DOCD IN RCRD: CPT | Performed by: STUDENT IN AN ORGANIZED HEALTH CARE EDUCATION/TRAINING PROGRAM

## 2023-11-27 PROCEDURE — 3074F SYST BP LT 130 MM HG: CPT | Performed by: STUDENT IN AN ORGANIZED HEALTH CARE EDUCATION/TRAINING PROGRAM

## 2023-11-27 PROCEDURE — 99203 OFFICE O/P NEW LOW 30 MIN: CPT | Performed by: STUDENT IN AN ORGANIZED HEALTH CARE EDUCATION/TRAINING PROGRAM

## 2023-11-27 PROCEDURE — 3079F DIAST BP 80-89 MM HG: CPT | Performed by: STUDENT IN AN ORGANIZED HEALTH CARE EDUCATION/TRAINING PROGRAM

## 2024-01-23 NOTE — PRE-PROCEDURE INSTRUCTIONS
Gave patient arrival time 0900 informed patient npo after midnight instructed patient to take all meds beside mobic the day of procedure with sip of water. Informed patient will need a  over 18 years of age to take them home and to come to entrance c. Pt verbalized understanding.

## 2024-02-01 ENCOUNTER — ANESTHESIA EVENT (OUTPATIENT)
Dept: GASTROENTEROLOGY | Facility: HOSPITAL | Age: 39
End: 2024-02-01
Payer: COMMERCIAL

## 2024-02-01 NOTE — ANESTHESIA PREPROCEDURE EVALUATION
Anesthesia Evaluation     NPO Solid Status: > 8 hours  NPO Liquid Status: > 2 hours           Airway   Mallampati: II  TM distance: >3 FB  No difficulty expected  Dental    (+) poor dentition        Pulmonary - normal exam   (+) a smoker Current, Smoked day of surgery, asthma (mild, inhaler use approx once/week, breathing at baseline, easy, unlabored),  Cardiovascular - normal exam    (+) hypertension      Neuro/Psych  (+) headaches, psychiatric history Anxiety and Depression  GI/Hepatic/Renal/Endo    (+) GERD    Musculoskeletal     Abdominal    Substance History      OB/GYN          Other   arthritis,                     Anesthesia Plan    ASA 2     general   total IV anesthesia    Anesthetic plan, risks, benefits, and alternatives have been provided, discussed and informed consent has been obtained with: patient.  Pre-procedure education provided  Plan discussed with CRNA.        CODE STATUS:

## 2024-02-02 ENCOUNTER — HOSPITAL ENCOUNTER (OUTPATIENT)
Facility: HOSPITAL | Age: 39
Setting detail: HOSPITAL OUTPATIENT SURGERY
Discharge: HOME | End: 2024-02-02
Attending: STUDENT IN AN ORGANIZED HEALTH CARE EDUCATION/TRAINING PROGRAM | Admitting: STUDENT IN AN ORGANIZED HEALTH CARE EDUCATION/TRAINING PROGRAM
Payer: COMMERCIAL

## 2024-02-02 ENCOUNTER — ANESTHESIA (OUTPATIENT)
Dept: GASTROENTEROLOGY | Facility: HOSPITAL | Age: 39
End: 2024-02-02
Payer: COMMERCIAL

## 2024-02-02 VITALS
SYSTOLIC BLOOD PRESSURE: 110 MMHG | HEART RATE: 63 BPM | WEIGHT: 167.55 LBS | BODY MASS INDEX: 29.21 KG/M2 | TEMPERATURE: 98.7 F | DIASTOLIC BLOOD PRESSURE: 87 MMHG | OXYGEN SATURATION: 96 % | RESPIRATION RATE: 15 BRPM

## 2024-02-02 DIAGNOSIS — K21.9 GASTROESOPHAGEAL REFLUX DISEASE, UNSPECIFIED WHETHER ESOPHAGITIS PRESENT: ICD-10-CM

## 2024-02-02 PROCEDURE — 88305 TISSUE EXAM BY PATHOLOGIST: CPT | Performed by: STUDENT IN AN ORGANIZED HEALTH CARE EDUCATION/TRAINING PROGRAM

## 2024-02-02 PROCEDURE — 25010000002 ONDANSETRON PER 1 MG

## 2024-02-02 PROCEDURE — 25010000002 PROPOFOL 10 MG/ML EMULSION: Performed by: NURSE ANESTHETIST, CERTIFIED REGISTERED

## 2024-02-02 PROCEDURE — 25810000003 LACTATED RINGERS PER 1000 ML: Performed by: ANESTHESIOLOGY

## 2024-02-02 RX ORDER — ONDANSETRON 4 MG/1
4 TABLET, ORALLY DISINTEGRATING ORAL ONCE AS NEEDED
Status: DISCONTINUED | OUTPATIENT
Start: 2024-02-02 | End: 2024-02-02 | Stop reason: HOSPADM

## 2024-02-02 RX ORDER — ONDANSETRON 2 MG/ML
4 INJECTION INTRAMUSCULAR; INTRAVENOUS ONCE
Status: COMPLETED | OUTPATIENT
Start: 2024-02-02 | End: 2024-02-02

## 2024-02-02 RX ORDER — LIDOCAINE HYDROCHLORIDE 20 MG/ML
INJECTION, SOLUTION EPIDURAL; INFILTRATION; INTRACAUDAL; PERINEURAL AS NEEDED
Status: DISCONTINUED | OUTPATIENT
Start: 2024-02-02 | End: 2024-02-02 | Stop reason: SURG

## 2024-02-02 RX ORDER — PROPOFOL 10 MG/ML
VIAL (ML) INTRAVENOUS AS NEEDED
Status: DISCONTINUED | OUTPATIENT
Start: 2024-02-02 | End: 2024-02-02 | Stop reason: SURG

## 2024-02-02 RX ORDER — SODIUM CHLORIDE, SODIUM LACTATE, POTASSIUM CHLORIDE, CALCIUM CHLORIDE 600; 310; 30; 20 MG/100ML; MG/100ML; MG/100ML; MG/100ML
30 INJECTION, SOLUTION INTRAVENOUS CONTINUOUS
Status: DISCONTINUED | OUTPATIENT
Start: 2024-02-02 | End: 2024-02-02 | Stop reason: HOSPADM

## 2024-02-02 RX ORDER — ONDANSETRON 2 MG/ML
INJECTION INTRAMUSCULAR; INTRAVENOUS
Status: COMPLETED
Start: 2024-02-02 | End: 2024-02-02

## 2024-02-02 RX ADMIN — SODIUM CHLORIDE, POTASSIUM CHLORIDE, SODIUM LACTATE AND CALCIUM CHLORIDE 30 ML/HR: 600; 310; 30; 20 INJECTION, SOLUTION INTRAVENOUS at 06:31

## 2024-02-02 RX ADMIN — ONDANSETRON 4 MG: 2 INJECTION INTRAMUSCULAR; INTRAVENOUS at 08:16

## 2024-02-02 RX ADMIN — LIDOCAINE HYDROCHLORIDE 100 MG: 20 INJECTION, SOLUTION EPIDURAL; INFILTRATION; INTRACAUDAL; PERINEURAL at 07:34

## 2024-02-02 RX ADMIN — PROPOFOL 50 MG: 10 INJECTION, EMULSION INTRAVENOUS at 07:34

## 2024-02-02 RX ADMIN — PROPOFOL 250 MCG/KG/MIN: 10 INJECTION, EMULSION INTRAVENOUS at 07:34

## 2024-02-02 NOTE — ANESTHESIA POSTPROCEDURE EVALUATION
Patient: Crystal Mast    Procedure Summary       Date: 02/02/24 Room / Location: [facility] ENDOSCOPY 3 / [facility] ENDOSCOPY    Anesthesia Start: 0733 Anesthesia Stop: 0754    Procedure: ESOPHAGOGASTRODUODENOSCOPY WITH BIOPSIES Diagnosis:       Gastroesophageal reflux disease, unspecified whether esophagitis present      (Gastroesophageal reflux disease, unspecified whether esophagitis present [K21.9])    Surgeons: Vaughn Myers MD Provider: Carly Polanco CRNA    Anesthesia Type: general ASA Status: 2            Anesthesia Type: general    Vitals  Vitals Value Taken Time   /87 02/02/24 0810   Temp 37.1 °C (98.7 °F) 02/02/24 0810   Pulse 57 02/02/24 0819   Resp 15 02/02/24 0810   SpO2 97 % 02/02/24 0819   Vitals shown include unfiled device data.        Post Anesthesia Care and Evaluation    Patient location during evaluation: bedside  Patient participation: complete - patient participated  Level of consciousness: awake  Pain management: adequate    Airway patency: patent  Anesthetic complications: No anesthetic complications  PONV Status: controlled  Cardiovascular status: acceptable and stable  Respiratory status: acceptable

## 2024-02-02 NOTE — H&P
[facility]   GENERAL SURGERY  HISTORY AND PHYSICAL    Patient Name: Crystal Mast  : 1985  MRN: [id number]  Primary Care Physician:  Karin Wong APRN  Date of admission: 2024    Subjective     Chief Complaint:  EGD    HPI:  Crystal Mast is a 38 y.o. female who presents today as a new referral for cholelithiasis.  Patient states that for the past 10 years she has had chronic abdominal pain.  She notes that her pain is in the epigastric region and is exacerbated with any oral intake.  She denies any right upper quadrant or right shoulder pain.  Her episodes of pain typically take around 1 day to resolve and have made her somewhat nervous to eat.  She has some associated nausea but denies any fevers or chills.  She says she has been told in the past that she has gastroesophageal reflux disease and she has tried multiple medications have not improved her symptoms.  She is never undergone evaluation with upper endoscopy in the past.  Her abdominal surgical history includes multiple C-sections.     Personal History   Allergies:  No Known Allergies    Home Medications:  Prior to Admission medications    Medication Sig Start Date End Date Taking? Authorizing Provider   albuterol sulfate  (90 Base) MCG/ACT inhaler Inhale 2 puffs Every 4 (Four) Hours As Needed for Wheezing. 22  Yes Karin Wong APRN   busPIRone (BUSPAR) 5 MG tablet Take 1 tablet by mouth 3 (Three) Times a Day As Needed (anxiety). 10/25/23  Yes Camilla Foy APRN   DULoxetine (CYMBALTA) 30 MG capsule Take 1 capsule by mouth Daily. 10/25/23  Yes Camilla Foy APRN   folic acid (FOLVITE) 1 MG tablet Take 1 tablet by mouth Daily. 22  Yes Karin Wong APRN   gabapentin (NEURONTIN) 300 MG capsule Take 1 capsule by mouth 3 (Three) Times a Day. 8/10/22  Yes Provider, MD [place]   HYDROcodone-acetaminophen (NORCO) 7.5-325 MG per tablet Take 1 tablet by mouth Every 8 (Eight) Hours As Needed. for  pain 8/10/22  Yes Provider, MD Alma   meloxicam (MOBIC) 15 MG tablet Take 1 tablet by mouth Daily. 22  Yes Karin Wong APRN   valACYclovir (Valtrex) 1000 MG tablet Take 1 tablet by mouth Daily. 23  Yes Camilla Foy APRN   vitamin D (ERGOCALCIFEROL) 1.25 MG (89047 UT) capsule capsule Take 1 capsule by mouth 1 (One) Time Per Week. 10/26/23  Yes Camilla Foy APRN   albuterol (ACCUNEB) 1.25 MG/3ML nebulizer solution Take 3 mL by nebulization Every 6 (Six) Hours As Needed for Wheezing. 10/25/21   Karin Wong APRN   ipratropium (Atrovent HFA) 17 MCG/ACT inhaler Inhale 2 puffs 2 (Two) Times a Day.  Patient not taking: Reported on 2023  Karin Wong APRN       Past Medical History:   Diagnosis Date    Anxiety and depression     Breast infection     Cholelithiasis     Hypertension     Insomnia     Spinal headache     WITH EPIDURAL  X 3       Past Surgical History:   Procedure Laterality Date    BILATERAL BREAST REDUCTION      BREAST BIOPSY      Both sides    BREAST CYST ASPIRATION      BREAST RECONSTRUCTION      WITH LATISSIMUS DORSI MYOCUTANEOUS FLAP     SECTION      3 OR MORE    REDUCTION MAMMAPLASTY  2012    TUBAL ABDOMINAL LIGATION         Social History:  reports that she has been smoking cigarettes. She started smoking about 21 years ago. She has a 15.00 pack-year smoking history. She has never used smokeless tobacco. She reports that she does not drink alcohol and does not use drugs.    Family History: family history includes Arthritis in her father and mother; Diabetes in her father; Heart disease in her mother; Hypertension in her father, mother, and another family member; Kidney disease in her father. Otherwise pertinent FHx was reviewed and not pertinent to current issue.    Review of Systems: Review of systems is noncontributory besides as mentioned in above HPI section.   All systems were reviewed and negative  "except for: none    Objective   Vitals:   Temp:  [97.3 °F (36.3 °C)] 97.3 °F (36.3 °C)  Heart Rate:  [66] 66  Resp:  [18] 18  BP: (133)/(98) 133/98  Physical Exam   Constitutional: healthy appearing, alert, no acute distress, reliable historian  HENT:  NCAT, no visible deformities or lesions  Eyes:  sclerae clear, conjunctivae clear, EOMI  Neck:  normal appearance, no masses, trachea midline  Respiratory:  breathing not labored, respiratory effort appears normal  Cardiovascular:  heart regular rate and rhythm  Abdomen:  soft, nontender, nondistended    Skin and subcutaneous tissue:  no visible concerning rashes or lesions, no jaundice  Musculoskeletal: moving all extremities symmetrically and purposefully  Neurologic:  no obvious motor or sensory deficits, cerebellar function without any obvious abnormalities, alert & oriented x 3, speech clear  Psychiatric:  judgment and insight intact, mood normal, affect appropriate, cooperative    CBC          10/25/2023    14:04   CBC   WBC 10.35    RBC 5.01    Hemoglobin 15.1    Hematocrit 44.9    MCV 89.6    MCH 30.1    MCHC 33.6    RDW 13.3    Platelets 287      CMP          10/25/2023    14:04   CMP   Glucose 95    BUN 15    Creatinine 0.67    EGFR 115.6    Sodium 140    Potassium 3.8    Chloride 105    Calcium 9.7    Total Protein 7.0    Albumin 4.5    Globulin 2.5    Total Bilirubin 0.6    Alkaline Phosphatase 82    AST (SGOT) 15    ALT (SGPT) 15    Albumin/Globulin Ratio 1.8    BUN/Creatinine Ratio 22.4    Anion Gap 10.0                    Invalid input(s): \"PROT\"      No results found for: \"LIPASE\"  No results found for: \"INR\"  No results found for: \"LACTATE\"    Radiology:  No orders to display          LABS:  Lab Results (last 48 hours)       ** No results found for the last 48 hours. **            IMAGING:  Imaging Results (Last 48 Hours)       ** No results found for the last 48 hours. **            Result Review:  I have personally reviewed the following checkmarked " results from the time of this admission to 2/2/2024 07:27 EST:  [x]  Laboratory  []  Microbiology  []  Radiology  []  EKG/Telemetry   []  Cardiology/Vascular   []  Pathology  []  Old records  []  Other:        Assessment & Plan   Assessment / Plan     Assessment:  Active Hospital Problems:  Active Hospital Problems    Diagnosis     **Gastroesophageal reflux disease        Plan:   1. Calculus of gallbladder without cholecystitis without obstruction (Primary)     2. Gastroesophageal reflux disease, unspecified whether esophagitis present    38-year-old female with chronic epigastric abdominal pain.  Found to have cholelithiasis on right upper quadrant ultrasound.  She does not have any classic biliary type symptoms and for this reason would recommend evaluation first with upper endoscopy with possible biopsy.  Risks/benefits/alternatives of the procedure were explained to the patient she was agreeable to proceed.  Based on intraoperative findings of EGD, will discuss possible elective cholecystectomy in the future.     Electronically signed by Vaughn Myers MD, 02/02/24, 7:27 AM EST.

## 2024-02-05 LAB
CYTO UR: NORMAL
LAB AP CASE REPORT: NORMAL
LAB AP CLINICAL INFORMATION: NORMAL
PATH REPORT.FINAL DX SPEC: NORMAL
PATH REPORT.GROSS SPEC: NORMAL

## 2024-02-07 DIAGNOSIS — K80.20 SYMPTOMATIC CHOLELITHIASIS: Primary | ICD-10-CM

## 2024-02-07 RX ORDER — ONDANSETRON 2 MG/ML
4 INJECTION INTRAMUSCULAR; INTRAVENOUS EVERY 6 HOURS PRN
OUTPATIENT
Start: 2024-02-07

## 2024-02-07 RX ORDER — SODIUM CHLORIDE 0.9 % (FLUSH) 0.9 %
10 SYRINGE (ML) INJECTION EVERY 12 HOURS SCHEDULED
OUTPATIENT
Start: 2024-02-07

## 2024-02-07 RX ORDER — SODIUM CHLORIDE 9 MG/ML
40 INJECTION, SOLUTION INTRAVENOUS AS NEEDED
OUTPATIENT
Start: 2024-02-07

## 2024-02-07 RX ORDER — SODIUM CHLORIDE, SODIUM LACTATE, POTASSIUM CHLORIDE, CALCIUM CHLORIDE 600; 310; 30; 20 MG/100ML; MG/100ML; MG/100ML; MG/100ML
70 INJECTION, SOLUTION INTRAVENOUS CONTINUOUS
OUTPATIENT
Start: 2024-02-07

## 2024-02-07 RX ORDER — SODIUM CHLORIDE 0.9 % (FLUSH) 0.9 %
10 SYRINGE (ML) INJECTION AS NEEDED
OUTPATIENT
Start: 2024-02-07

## 2024-02-09 ENCOUNTER — TELEPHONE (OUTPATIENT)
Dept: SURGERY | Facility: CLINIC | Age: 39
End: 2024-02-09
Payer: COMMERCIAL

## 2024-02-09 NOTE — TELEPHONE ENCOUNTER
Attempted to call patient to schedule her procedure. She did not answer. Left a message requesting a call back to get her scheduled.   ----- Message from Vaughn Myers MD sent at 2/7/2024  2:28 PM EST -----  EGD was okay and patient is still having symptoms. Will plan for cholecystectomy.   ----- Message -----  From: Lab, Background User  Sent: 2/5/2024   8:56 AM EST  To: Vaughn Myers MD

## 2024-02-12 NOTE — TELEPHONE ENCOUNTER
2nd attempt to call the patient to get her scheduled. Left detailed message requesting a call back.

## 2024-02-13 PROBLEM — K80.20 SYMPTOMATIC CHOLELITHIASIS: Status: ACTIVE | Noted: 2024-02-07

## 2024-03-01 ENCOUNTER — CLINICAL SUPPORT (OUTPATIENT)
Dept: FAMILY MEDICINE CLINIC | Facility: CLINIC | Age: 39
End: 2024-03-01
Payer: COMMERCIAL

## 2024-03-01 DIAGNOSIS — E55.9 VITAMIN D DEFICIENCY: ICD-10-CM

## 2024-03-01 LAB — 25(OH)D3 SERPL-MCNC: 27.1 NG/ML (ref 30–100)

## 2024-03-01 PROCEDURE — 82306 VITAMIN D 25 HYDROXY: CPT | Performed by: NURSE PRACTITIONER

## 2024-03-01 PROCEDURE — 36415 COLL VENOUS BLD VENIPUNCTURE: CPT | Performed by: NURSE PRACTITIONER

## 2024-03-01 NOTE — PROGRESS NOTES
..  Venipuncture Blood Specimen Collection  Venipuncture performed in LT arm by Bridget Boone MA with good hemostasis. Patient tolerated the procedure well without complications.   03/01/24   Bridget Boone MA

## 2024-03-04 DIAGNOSIS — E55.9 VITAMIN D INSUFFICIENCY: Primary | ICD-10-CM

## 2024-03-04 NOTE — PROGRESS NOTES
Patient only wants to receive [email or web address] messages and nothing by mail    Crystal the vitamin D level was still a little low at 27.1 and I will send in the Rx for vitamin D3 5000 IUs daily and then approximately at 3 months we probably need to recheck those levels

## 2024-03-14 ENCOUNTER — ANESTHESIA EVENT (OUTPATIENT)
Dept: PERIOP | Facility: HOSPITAL | Age: 39
End: 2024-03-14
Payer: COMMERCIAL

## 2024-03-14 ENCOUNTER — ANESTHESIA (OUTPATIENT)
Dept: PERIOP | Facility: HOSPITAL | Age: 39
End: 2024-03-14
Payer: COMMERCIAL

## 2024-03-14 ENCOUNTER — TELEPHONE (OUTPATIENT)
Dept: SURGERY | Facility: CLINIC | Age: 39
End: 2024-03-14

## 2024-03-14 ENCOUNTER — HOSPITAL ENCOUNTER (OUTPATIENT)
Facility: HOSPITAL | Age: 39
Setting detail: HOSPITAL OUTPATIENT SURGERY
Discharge: HOME | End: 2024-03-14
Attending: STUDENT IN AN ORGANIZED HEALTH CARE EDUCATION/TRAINING PROGRAM | Admitting: STUDENT IN AN ORGANIZED HEALTH CARE EDUCATION/TRAINING PROGRAM
Payer: COMMERCIAL

## 2024-03-14 VITALS
TEMPERATURE: 97 F | HEART RATE: 60 BPM | DIASTOLIC BLOOD PRESSURE: 70 MMHG | RESPIRATION RATE: 16 BRPM | OXYGEN SATURATION: 94 % | SYSTOLIC BLOOD PRESSURE: 111 MMHG

## 2024-03-14 DIAGNOSIS — K80.20 SYMPTOMATIC CHOLELITHIASIS: ICD-10-CM

## 2024-03-14 DIAGNOSIS — K80.20 SYMPTOMATIC CHOLELITHIASIS: Primary | ICD-10-CM

## 2024-03-14 PROCEDURE — 25010000002 MIDAZOLAM PER 1MG: Performed by: ANESTHESIOLOGY

## 2024-03-14 PROCEDURE — 25010000002 SUGAMMADEX 200 MG/2ML SOLUTION

## 2024-03-14 PROCEDURE — 25010000002 HYDROMORPHONE 1 MG/ML SOLUTION

## 2024-03-14 PROCEDURE — 25010000002 MEPERIDINE PER 100 MG

## 2024-03-14 PROCEDURE — 47562 LAPAROSCOPIC CHOLECYSTECTOMY: CPT | Performed by: STUDENT IN AN ORGANIZED HEALTH CARE EDUCATION/TRAINING PROGRAM

## 2024-03-14 PROCEDURE — 25010000002 PROPOFOL 10 MG/ML EMULSION

## 2024-03-14 PROCEDURE — 25010000002 ONDANSETRON PER 1 MG

## 2024-03-14 PROCEDURE — 25810000003 LACTATED RINGERS PER 1000 ML: Performed by: ANESTHESIOLOGY

## 2024-03-14 PROCEDURE — 25010000002 DEXAMETHASONE PER 1 MG

## 2024-03-14 PROCEDURE — 88304 TISSUE EXAM BY PATHOLOGIST: CPT | Performed by: STUDENT IN AN ORGANIZED HEALTH CARE EDUCATION/TRAINING PROGRAM

## 2024-03-14 PROCEDURE — 0 CEFAZOLIN SODIUM 2 G RECONSTITUTED SOLUTION: Performed by: STUDENT IN AN ORGANIZED HEALTH CARE EDUCATION/TRAINING PROGRAM

## 2024-03-14 DEVICE — LIGACLIP 10-M/L, 10MM ENDOSCOPIC ROTATING MULTIPLE CLIP APPLIERS
Type: IMPLANTABLE DEVICE | Site: ABDOMEN | Status: FUNCTIONAL
Brand: LIGACLIP

## 2024-03-14 RX ORDER — SODIUM CHLORIDE, SODIUM LACTATE, POTASSIUM CHLORIDE, CALCIUM CHLORIDE 600; 310; 30; 20 MG/100ML; MG/100ML; MG/100ML; MG/100ML
70 INJECTION, SOLUTION INTRAVENOUS CONTINUOUS
Status: DISCONTINUED | OUTPATIENT
Start: 2024-03-14 | End: 2024-03-14 | Stop reason: HOSPADM

## 2024-03-14 RX ORDER — ONDANSETRON 2 MG/ML
4 INJECTION INTRAMUSCULAR; INTRAVENOUS EVERY 6 HOURS PRN
Status: DISCONTINUED | OUTPATIENT
Start: 2024-03-14 | End: 2024-03-14 | Stop reason: HOSPADM

## 2024-03-14 RX ORDER — DEXAMETHASONE SODIUM PHOSPHATE 4 MG/ML
INJECTION, SOLUTION INTRA-ARTICULAR; INTRALESIONAL; INTRAMUSCULAR; INTRAVENOUS; SOFT TISSUE AS NEEDED
Status: DISCONTINUED | OUTPATIENT
Start: 2024-03-14 | End: 2024-03-14 | Stop reason: SURG

## 2024-03-14 RX ORDER — HYDROCODONE BITARTRATE AND ACETAMINOPHEN 7.5; 325 MG/1; MG/1
1 TABLET ORAL EVERY 6 HOURS PRN
Qty: 15 TABLET | Refills: 0 | Status: SHIPPED | OUTPATIENT
Start: 2024-03-14

## 2024-03-14 RX ORDER — SODIUM CHLORIDE, SODIUM LACTATE, POTASSIUM CHLORIDE, CALCIUM CHLORIDE 600; 310; 30; 20 MG/100ML; MG/100ML; MG/100ML; MG/100ML
9 INJECTION, SOLUTION INTRAVENOUS CONTINUOUS PRN
Status: DISCONTINUED | OUTPATIENT
Start: 2024-03-14 | End: 2024-03-14 | Stop reason: HOSPADM

## 2024-03-14 RX ORDER — MIDAZOLAM HYDROCHLORIDE 2 MG/2ML
2 INJECTION, SOLUTION INTRAMUSCULAR; INTRAVENOUS ONCE
Status: COMPLETED | OUTPATIENT
Start: 2024-03-14 | End: 2024-03-14

## 2024-03-14 RX ORDER — ROCURONIUM BROMIDE 10 MG/ML
INJECTION, SOLUTION INTRAVENOUS AS NEEDED
Status: DISCONTINUED | OUTPATIENT
Start: 2024-03-14 | End: 2024-03-14 | Stop reason: SURG

## 2024-03-14 RX ORDER — BUPIVACAINE HCL/0.9 % NACL/PF 0.1 %
2000 PLASTIC BAG, INJECTION (ML) EPIDURAL ONCE
Status: COMPLETED | OUTPATIENT
Start: 2024-03-14 | End: 2024-03-14

## 2024-03-14 RX ORDER — ONDANSETRON 2 MG/ML
4 INJECTION INTRAMUSCULAR; INTRAVENOUS ONCE AS NEEDED
Status: DISCONTINUED | OUTPATIENT
Start: 2024-03-14 | End: 2024-03-14 | Stop reason: HOSPADM

## 2024-03-14 RX ORDER — SODIUM CHLORIDE 9 MG/ML
40 INJECTION, SOLUTION INTRAVENOUS AS NEEDED
Status: DISCONTINUED | OUTPATIENT
Start: 2024-03-14 | End: 2024-03-14 | Stop reason: HOSPADM

## 2024-03-14 RX ORDER — PROMETHAZINE HYDROCHLORIDE 12.5 MG/1
25 TABLET ORAL ONCE AS NEEDED
Status: DISCONTINUED | OUTPATIENT
Start: 2024-03-14 | End: 2024-03-14 | Stop reason: HOSPADM

## 2024-03-14 RX ORDER — KETAMINE HCL IN NACL, ISO-OSM 100MG/10ML
SYRINGE (ML) INJECTION AS NEEDED
Status: DISCONTINUED | OUTPATIENT
Start: 2024-03-14 | End: 2024-03-14 | Stop reason: SURG

## 2024-03-14 RX ORDER — LIDOCAINE HYDROCHLORIDE AND EPINEPHRINE 10; 10 MG/ML; UG/ML
INJECTION, SOLUTION INFILTRATION; PERINEURAL AS NEEDED
Status: DISCONTINUED | OUTPATIENT
Start: 2024-03-14 | End: 2024-03-14 | Stop reason: HOSPADM

## 2024-03-14 RX ORDER — PROMETHAZINE HYDROCHLORIDE 25 MG/1
25 SUPPOSITORY RECTAL ONCE AS NEEDED
Status: DISCONTINUED | OUTPATIENT
Start: 2024-03-14 | End: 2024-03-14 | Stop reason: HOSPADM

## 2024-03-14 RX ORDER — SODIUM CHLORIDE 0.9 % (FLUSH) 0.9 %
10 SYRINGE (ML) INJECTION AS NEEDED
Status: DISCONTINUED | OUTPATIENT
Start: 2024-03-14 | End: 2024-03-14 | Stop reason: HOSPADM

## 2024-03-14 RX ORDER — LIDOCAINE HYDROCHLORIDE 20 MG/ML
INJECTION, SOLUTION EPIDURAL; INFILTRATION; INTRACAUDAL; PERINEURAL AS NEEDED
Status: DISCONTINUED | OUTPATIENT
Start: 2024-03-14 | End: 2024-03-14 | Stop reason: SURG

## 2024-03-14 RX ORDER — OXYCODONE HYDROCHLORIDE 5 MG/1
5 TABLET ORAL
Status: COMPLETED | OUTPATIENT
Start: 2024-03-14 | End: 2024-03-14

## 2024-03-14 RX ORDER — MEPERIDINE HYDROCHLORIDE 25 MG/ML
12.5 INJECTION INTRAMUSCULAR; INTRAVENOUS; SUBCUTANEOUS
Status: DISCONTINUED | OUTPATIENT
Start: 2024-03-14 | End: 2024-03-14 | Stop reason: HOSPADM

## 2024-03-14 RX ORDER — ACETAMINOPHEN 500 MG
1000 TABLET ORAL ONCE
Status: COMPLETED | OUTPATIENT
Start: 2024-03-14 | End: 2024-03-14

## 2024-03-14 RX ORDER — MAGNESIUM HYDROXIDE 1200 MG/15ML
LIQUID ORAL AS NEEDED
Status: DISCONTINUED | OUTPATIENT
Start: 2024-03-14 | End: 2024-03-14 | Stop reason: HOSPADM

## 2024-03-14 RX ORDER — PROPOFOL 10 MG/ML
VIAL (ML) INTRAVENOUS AS NEEDED
Status: DISCONTINUED | OUTPATIENT
Start: 2024-03-14 | End: 2024-03-14 | Stop reason: SURG

## 2024-03-14 RX ORDER — SODIUM CHLORIDE 0.9 % (FLUSH) 0.9 %
10 SYRINGE (ML) INJECTION EVERY 12 HOURS SCHEDULED
Status: DISCONTINUED | OUTPATIENT
Start: 2024-03-14 | End: 2024-03-14 | Stop reason: HOSPADM

## 2024-03-14 RX ORDER — ONDANSETRON 2 MG/ML
INJECTION INTRAMUSCULAR; INTRAVENOUS AS NEEDED
Status: DISCONTINUED | OUTPATIENT
Start: 2024-03-14 | End: 2024-03-14 | Stop reason: SURG

## 2024-03-14 RX ORDER — SCOLOPAMINE TRANSDERMAL SYSTEM 1 MG/1
1 PATCH, EXTENDED RELEASE TRANSDERMAL ONCE
Status: DISCONTINUED | OUTPATIENT
Start: 2024-03-14 | End: 2024-03-14 | Stop reason: HOSPADM

## 2024-03-14 RX ORDER — HYDROCODONE BITARTRATE AND ACETAMINOPHEN 7.5; 325 MG/1; MG/1
1 TABLET ORAL EVERY 6 HOURS PRN
Qty: 15 TABLET | Refills: 0 | Status: SHIPPED | OUTPATIENT
Start: 2024-03-14 | End: 2024-03-14

## 2024-03-14 RX ORDER — ONDANSETRON 4 MG/1
4 TABLET, ORALLY DISINTEGRATING ORAL ONCE AS NEEDED
Status: DISCONTINUED | OUTPATIENT
Start: 2024-03-14 | End: 2024-03-14 | Stop reason: HOSPADM

## 2024-03-14 RX ADMIN — SCOPALAMINE 1 PATCH: 1 PATCH, EXTENDED RELEASE TRANSDERMAL at 08:29

## 2024-03-14 RX ADMIN — OXYCODONE 5 MG: 5 TABLET ORAL at 10:43

## 2024-03-14 RX ADMIN — MEPERIDINE HYDROCHLORIDE 12.5 MG: 25 INJECTION INTRAMUSCULAR; INTRAVENOUS; SUBCUTANEOUS at 10:15

## 2024-03-14 RX ADMIN — MIDAZOLAM HYDROCHLORIDE 2 MG: 1 INJECTION, SOLUTION INTRAMUSCULAR; INTRAVENOUS at 08:30

## 2024-03-14 RX ADMIN — HYDROMORPHONE HYDROCHLORIDE 0.25 MG: 1 INJECTION, SOLUTION INTRAMUSCULAR; INTRAVENOUS; SUBCUTANEOUS at 10:26

## 2024-03-14 RX ADMIN — ROCURONIUM BROMIDE 70 MG: 10 INJECTION, SOLUTION INTRAVENOUS at 08:53

## 2024-03-14 RX ADMIN — OXYCODONE 5 MG: 5 TABLET ORAL at 11:08

## 2024-03-14 RX ADMIN — HYDROMORPHONE HYDROCHLORIDE 0.5 MG: 1 INJECTION, SOLUTION INTRAMUSCULAR; INTRAVENOUS; SUBCUTANEOUS at 10:02

## 2024-03-14 RX ADMIN — ONDANSETRON HYDROCHLORIDE 4 MG: 2 SOLUTION INTRAMUSCULAR; INTRAVENOUS at 08:50

## 2024-03-14 RX ADMIN — SODIUM CHLORIDE, POTASSIUM CHLORIDE, SODIUM LACTATE AND CALCIUM CHLORIDE 9 ML/HR: 600; 310; 30; 20 INJECTION, SOLUTION INTRAVENOUS at 08:24

## 2024-03-14 RX ADMIN — SUGAMMADEX 200 MG: 100 INJECTION, SOLUTION INTRAVENOUS at 09:52

## 2024-03-14 RX ADMIN — DEXAMETHASONE SODIUM PHOSPHATE 8 MG: 4 INJECTION, SOLUTION INTRAMUSCULAR; INTRAVENOUS at 09:04

## 2024-03-14 RX ADMIN — ACETAMINOPHEN 1000 MG: 500 TABLET ORAL at 08:27

## 2024-03-14 RX ADMIN — HYDROMORPHONE HYDROCHLORIDE 0.5 MG: 1 INJECTION, SOLUTION INTRAMUSCULAR; INTRAVENOUS; SUBCUTANEOUS at 10:12

## 2024-03-14 RX ADMIN — HYDROMORPHONE HYDROCHLORIDE 0.5 MG: 1 INJECTION, SOLUTION INTRAMUSCULAR; INTRAVENOUS; SUBCUTANEOUS at 08:50

## 2024-03-14 RX ADMIN — PROPOFOL 200 MG: 10 INJECTION, EMULSION INTRAVENOUS at 08:53

## 2024-03-14 RX ADMIN — Medication 2000 MG: at 08:57

## 2024-03-14 RX ADMIN — Medication 50 MG: at 08:53

## 2024-03-14 RX ADMIN — LIDOCAINE HYDROCHLORIDE 50 MG: 20 INJECTION, SOLUTION INTRAVENOUS at 08:53

## 2024-03-14 NOTE — H&P
[facility]   GENERAL SURGERY  HISTORY AND PHYSICAL    Patient Name: Crystal Mast  : 1985  MRN: [id number]  Primary Care Physician:  Karin Wong APRN  Date of admission: 3/14/2024    Subjective     Chief Complaint:  symptomatic cholelithiasis    HPI:  Crystal Mast is a 38 y.o. female who presents today  for cholelithiasis. Patient states that for the past 10 years she has had chronic abdominal pain. She notes that her pain is in the epigastric region and is exacerbated with any oral intake. She denies any right upper quadrant or right shoulder pain. Her episodes of pain typically take around 1 day to resolve and have made her somewhat nervous to eat. She has some associated nausea but denies any fevers or chills. She says she has been told in the past that she has gastroesophageal reflux disease and she has tried multiple medications have not improved her symptoms. She underwent EGD last month without any significant findings.    Personal History   Allergies:  No Known Allergies    Home Medications:  Prior to Admission medications    Medication Sig Start Date End Date Taking? Authorizing Provider   busPIRone (BUSPAR) 5 MG tablet Take 1 tablet by mouth 3 (Three) Times a Day As Needed (anxiety). 10/25/23  Yes Camilla Foy APRN   Cholecalciferol (Vitamin D-3) 125 MCG (5000 UT) tablet Take 1 tablet by mouth Daily. 3/4/24  Yes Camilla Foy APRN   DULoxetine (CYMBALTA) 30 MG capsule Take 1 capsule by mouth Daily. 10/25/23  Yes Camilla Foy APRN   gabapentin (NEURONTIN) 300 MG capsule Take 1 capsule by mouth 3 (Three) Times a Day. 8/10/22  Yes Alma Chapman MD   HYDROcodone-acetaminophen (NORCO) 7.5-325 MG per tablet Take 1 tablet by mouth Every 8 (Eight) Hours As Needed. for pain 8/10/22  Yes Alma Chapman MD   meloxicam (MOBIC) 15 MG tablet Take 1 tablet by mouth Daily. 22  Yes Karin Wong APRN   valACYclovir (Valtrex) 1000 MG tablet Take 1  tablet by mouth Daily. 23  Yes FoyCamilla APRN   albuterol (ACCUNEB) 1.25 MG/3ML nebulizer solution Take 3 mL by nebulization Every 6 (Six) Hours As Needed for Wheezing. 10/25/21   Karin Wong APRN   albuterol sulfate  (90 Base) MCG/ACT inhaler Inhale 2 puffs Every 4 (Four) Hours As Needed for Wheezing. 22   Karin Wong APRN   folic acid (FOLVITE) 1 MG tablet Take 1 tablet by mouth Daily. 22   Karin Wong APRN       Past Medical History:   Diagnosis Date    Anxiety and depression     Breast infection     Cholelithiasis     Hypertension     Insomnia     Spinal headache     WITH EPIDURAL  X 3       Past Surgical History:   Procedure Laterality Date    BILATERAL BREAST REDUCTION      BREAST BIOPSY      Both sides    BREAST CYST ASPIRATION      BREAST RECONSTRUCTION      WITH LATISSIMUS DORSI MYOCUTANEOUS FLAP     SECTION      3 OR MORE    ENDOSCOPY N/A 2024    Procedure: ESOPHAGOGASTRODUODENOSCOPY WITH BIOPSIES;  Surgeon: Vaughn Myers MD;  Location: [facility] ENDOSCOPY;  Service: General;  Laterality: N/A;  GASTRITIS    REDUCTION MAMMAPLASTY      TUBAL ABDOMINAL LIGATION         Social History:  reports that she has been smoking cigarettes. She started smoking about 21 years ago. She has a 21.4 pack-year smoking history. She has never used smokeless tobacco. She reports that she does not drink alcohol and does not use drugs.    Family History: family history includes Arthritis in her father and mother; Diabetes in her father; Heart disease in her mother; Hypertension in her father, mother, and another family member; Kidney disease in her father. Otherwise pertinent FHx was reviewed and not pertinent to current issue.    Review of Systems: Review of systems is noncontributory besides as mentioned in above HPI section.   All systems were reviewed and negative except for: none    Objective   Vitals:   Temp:  [96.4 °F (35.8 °C)] 96.4  "°F (35.8 °C)  Heart Rate:  [58] 58  Resp:  [18] 18  BP: (144)/(76) 144/76  Physical Exam   Constitutional: healthy appearing, alert, no acute distress, reliable historian  HENT:  NCAT, no visible deformities or lesions  Eyes:  sclerae clear, conjunctivae clear, EOMI  Neck:  normal appearance, no masses, trachea midline  Respiratory:  breathing not labored, respiratory effort appears normal  Cardiovascular:  heart regular rate and rhythm  Abdomen:  soft, nontender, nondistended    Skin and subcutaneous tissue:  no visible concerning rashes or lesions, no jaundice  Musculoskeletal: moving all extremities symmetrically and purposefully  Neurologic:  no obvious motor or sensory deficits, cerebellar function without any obvious abnormalities, alert & oriented x 3, speech clear  Psychiatric:  judgment and insight intact, mood normal, affect appropriate, cooperative    CBC          10/25/2023    14:04   CBC   WBC 10.35    RBC 5.01    Hemoglobin 15.1    Hematocrit 44.9    MCV 89.6    MCH 30.1    MCHC 33.6    RDW 13.3    Platelets 287      CMP          10/25/2023    14:04   CMP   Glucose 95    BUN 15    Creatinine 0.67    EGFR 115.6    Sodium 140    Potassium 3.8    Chloride 105    Calcium 9.7    Total Protein 7.0    Albumin 4.5    Globulin 2.5    Total Bilirubin 0.6    Alkaline Phosphatase 82    AST (SGOT) 15    ALT (SGPT) 15    Albumin/Globulin Ratio 1.8    BUN/Creatinine Ratio 22.4    Anion Gap 10.0                    Invalid input(s): \"PROT\"      No results found for: \"LIPASE\"  No results found for: \"INR\"  No results found for: \"LACTATE\"    Radiology:  No orders to display          LABS:  Lab Results (last 48 hours)       ** No results found for the last 48 hours. **            IMAGING:  Imaging Results (Last 48 Hours)       ** No results found for the last 48 hours. **            Result Review:  I have personally reviewed the following checkmarked results from the time of this admission to 3/14/2024 08:29 EDT:  [x]  " Laboratory  []  Microbiology  []  Radiology  []  EKG/Telemetry   []  Cardiology/Vascular   []  Pathology  []  Old records  []  Other:        Assessment & Plan   Assessment / Plan     Assessment:  Active Hospital Problems:  Active Hospital Problems    Diagnosis     **Symptomatic cholelithiasis        Plan:   Symptomatic cholelithiasis    Plan today for laparoscopic possible open cholecystectomy and any other indicated procedure. Risks/benefits/alternatives of the procedure were explained to the patient and she was agreeable to proceed.    Electronically signed by Vaughn Myers MD, 03/14/24, 8:29 AM EDT.

## 2024-03-14 NOTE — TELEPHONE ENCOUNTER
PT HAD LAP NOMI TODAY AND THE PAIN MED WAS SENT TO [facility]. THEY WOULD NOT FILL IT BECAUSE SHE IS IN PAIN MANAGEMENT. SHE STATES THAT DR WILHELM [facility] SAID THAT SHE COULD HAVE IT SENT TO HER [facility] AND GET IT FILLED OUT OF POCKET. SHE IS ASKING IF HE WILL RESEND IT TO SAVE LUNA MARTINS.

## 2024-03-14 NOTE — OP NOTE
CHOLECYSTECTOMY LAPAROSCOPIC  Procedure Report    Patient Name:  Crystal Mast  YOB: 1985    Date of Surgery:  3/14/2024     Indications:  Symptomatic cholelithiasis    Pre-op Diagnosis:   Symptomatic cholelithiasis [K80.20]       Post-Op Diagnosis Codes:     * Symptomatic cholelithiasis [K80.20]    Procedure/CPT® Codes:      Procedure(s):  Laparoscopic cholecystectomy        Staff:  Surgeon(s):  Vaughn Myers MD    Assistant: Loren Horton RN CSA    Anesthesia: General    Estimated Blood Loss: 30 mL    Implants:    Implant Name Type Inv. Item Serial No.  Lot No. LRB No. Used Action   CLIPAPPLR M/ ENDO LIGACLIP ROT 10MM MD/WESTON - [id number] Implant CLIPAPPLR M/ ENDO LIGACLIP ROT 10MM MD/WESTON  [employer]  DIV OF J AND J [id number] N/A 1 Implanted       Specimen:          Specimens       ID Source Type Tests Collected By Collected At Frozen?    A Gallbladder Tissue TISSUE PATHOLOGY EXAM   Vaughn Myers MD 3/14/24 0938 No    Description: Gallbladder & Contents                Findings: Cholelithiasis    Complications: None apparent at the time of surgery    Description of Procedure: Informed consent was obtained before the patient was brought to the operating suite and placed in the supine position.  General endotracheal anesthesia was induced and maintained throughout the procedure.  The patient's abdomen was prepped and draped in standard sterile fashion before a timeout procedure was performed, verifying the correct patient, procedure, and other pertinent information.    Using a #15 blade scalpel an incision was made in the umbilicus and the abdomen was entered using You technique.  12 mm balloon trocar was inserted and the gas applied to achieve adequate pneumoperitoneum of 15 mmHg.  Laparoscope was inserted into the abdomen confirmed a safe entrance.  Under direct visualization three additional trocars were placed: two 5 mm trocars in the right costal margin and one 11 mm  trocar in the epigastric region.  Fundus of the gallbladder was grasped and retracted over the liver to expose the infundibulum.  Omental and peritoneal attachments were serially taken down using blunt dissection.  Blunt dissection was also used to expose cystic artery and cystic duct to achieve a critical view of safety.  Cystic duct and artery were both clipped x3 before being transected with laparoscopic scissors.  Exam confirmed clips were placed across the entire lumen of the artery and the duct; there was no leakage of bile from the divided duct.  Hook electrocautery was used taking the gallbladder off the bed of the liver without rupture.  Specimen was collected in an Endo Catch bag and removed from the abdomen at the end of the procedure.  Hemostasis along the liver bed was verified.  Irrigation and suction of right upper quadrant was performed confirming the effluent ran clear.  Trocars were then removed under direct visualization.  Specimen was passed off the table for analysis by pathology.  Umbilical fascia was closed with a 0 PDS in figure-of-eight fashion.  All skin incisions were closed with 4-0 subcuticular Monocryl.  Incisions were cleaned and dried before application of Exofin over top to conclude the procedure.    Patient tolerated the procedure well and there were no immediate complications.  All counts were correct x2 at the end of procedure.    I was present throughout the entirety of the procedure.    Disposition: Stable in PACU        The surgical first assist listed above assisted with all needed aspects of the procedure.    Electronically signed by Vaughn Myers MD, 03/14/24, 9:55 AM EDT.

## 2024-03-14 NOTE — DISCHARGE INSTRUCTIONS
DISCHARGE INSTRUCTIONS LAPAROSCOPIC CHOLECYSTECTOMY  (GALL BLADDER)      For your surgery you had:  General anesthesia (you may have a sore throat for the first 24 hours)     You may experience dizziness, drowsiness, or light-headedness for several hours following surgery.  Do not stay alone tonight.  Limit your activity for 24 hours.  Resume your diet slowly.  Follow whatever special dietary instructions you may have been given by your doctor.  You should not drive, operate machinery, drink alcohol, or sign legally binding documents for 24 hours or while you are taking pain medication.      NOTIFY YOUR DOCTOR IF YOU EXPERIENCE ANY OF THE FOLLOWING:  Temperature greater than 101 degrees Fahrenheit  Shaking Chills  Redness or excessive drainage from incision  Nausea, vomiting and/or pain that is not controlled by prescribed medications  Increase in bleeding or bleeding that is excessive  Unable to urinate in 6 hours after surgery  If unable to reach your doctor, please go to the closest Emergency Room Skin adhesive flakes off in 10-14 days.  You may shower tomorrow, no tub bathing, swimming or submerging of incisions for 2 weeks.  Apply an ice pack 24-48 hours.  You may experience gas discomfort 24-48 hours after discharge, especially in chest and shoulders.  Changing position frequently may alleviate this discomfort.  If you have excessive pain, swelling, redness, drainage or other problems, notify your physician.  If unable to urinate in 6 to 8 hours after surgery or urinating frequently in small amounts, notify your doctor or go to the nearest Emergency Room.  Medications per physician instructions as indicated on After Visit Govind.    Last dose of pain medication was given at:    .    SPECIAL INSTRUCTIONS:

## 2024-03-14 NOTE — ANESTHESIA PREPROCEDURE EVALUATION
Anesthesia Evaluation     Patient summary reviewed and Nursing notes reviewed   no history of anesthetic complications:   NPO Solid Status: > 8 hours  NPO Liquid Status: > 2 hours           Airway   Mallampati: II  TM distance: >3 FB  Neck ROM: full  No difficulty expected  Dental    (+) poor dentition    Comment: Much decay      Pulmonary - normal exam    breath sounds clear to auscultation  (+) a smoker Current, asthma,  Cardiovascular - normal exam  Exercise tolerance: good (4-7 METS)    Rhythm: regular  Rate: normal    (+) hypertension (off meds)      Neuro/Psych- negative ROS  GI/Hepatic/Renal/Endo      Musculoskeletal     Abdominal    Substance History      OB/GYN          Other        ROS/Med Hx Other: PAT Nursing Notes unavailable.                Anesthesia Plan    ASA 2     general     (Patient understands anesthesia not responsible for dental damage.)  intravenous induction     Anesthetic plan, risks, benefits, and alternatives have been provided, discussed and informed consent has been obtained with: patient.    Plan discussed with CRNA.    CODE STATUS:

## 2024-03-14 NOTE — ANESTHESIA POSTPROCEDURE EVALUATION
Patient: Crystal Mast    Procedure Summary       Date: 03/14/24 Room / Location: [facility] OR 08 / [facility] MAIN OR    Anesthesia Start: 0847 Anesthesia Stop: 1000    Procedure: CHOLECYSTECTOMY LAPAROSCOPIC (Abdomen) Diagnosis:       Symptomatic cholelithiasis      (Symptomatic cholelithiasis [K80.20])    Surgeons: Vaughn Myers MD Provider: Chung Baeza MD    Anesthesia Type: general ASA Status: 2            Anesthesia Type: general    Vitals  Vitals Value Taken Time   /80 03/14/24 1104   Temp 36.6 °C (97.8 °F) 03/14/24 1104   Pulse 74 03/14/24 1104   Resp 18 03/14/24 1104   SpO2 95 % 03/14/24 1104           Post Anesthesia Care and Evaluation    Patient location during evaluation: bedside  Patient participation: complete - patient participated  Level of consciousness: awake  Pain management: adequate    Airway patency: patent  PONV Status: none  Cardiovascular status: acceptable and stable  Respiratory status: acceptable  Hydration status: acceptable    Comments: An Anesthesiologist personally participated in the most demanding procedures (including induction and emergence if applicable) in the anesthesia plan, monitored the course of anesthesia administration at frequent intervals and remained physically present and available for immediate diagnosis and treatment of emergencies.

## 2024-03-22 ENCOUNTER — TELEPHONE (OUTPATIENT)
Dept: SURGERY | Facility: CLINIC | Age: 39
End: 2024-03-22
Payer: COMMERCIAL

## 2024-03-22 NOTE — TELEPHONE ENCOUNTER
PT CALLED AND STATED SHE IS IN PAIN MANAGEMENT AND NEEDS A LETTER FAXED TO THEM STATING DR. CLEMENS IS NO LONGER GOING TO BE PRESCRIBING PAIN MEDS FOR PT. [phone]

## 2024-03-29 ENCOUNTER — OFFICE VISIT (OUTPATIENT)
Dept: SURGERY | Facility: CLINIC | Age: 39
End: 2024-03-29
Payer: COMMERCIAL

## 2024-03-29 VITALS
TEMPERATURE: 98.2 F | DIASTOLIC BLOOD PRESSURE: 77 MMHG | SYSTOLIC BLOOD PRESSURE: 129 MMHG | BODY MASS INDEX: 28.89 KG/M2 | HEART RATE: 63 BPM | HEIGHT: 64 IN | WEIGHT: 169.2 LBS

## 2024-03-29 DIAGNOSIS — Z98.890 POST-OPERATIVE STATE: Primary | ICD-10-CM

## 2024-03-29 NOTE — PROGRESS NOTES
"Chief Complaint  Post-op Follow-up (KEREN.)    Subjective    Subjective     Crystal Mast is a 39 y.o. female who presents to [facility] GENERAL SURGERY    History of Present Illness  39-year-old female who presents today for routine postoperative follow-up after undergoing laparoscopic cholecystectomy approximately 2 weeks ago.  Postoperatively she is doing well overall.  She is tolerating a regular diet and having normal bowel movements.  She denies any nausea, vomiting, fevers, chills.      Personal History     Social History     Tobacco Use    Smoking status: Every Day     Current packs/day: 1.00     Average packs/day: 1 pack/day for 21.4 years (21.4 ttl pk-yrs)     Types: Cigarettes     Start date: 10/25/2002    Smokeless tobacco: Never   Vaping Use    Vaping status: Never Used   Substance Use Topics    Alcohol use: Never    Drug use: Never     No Known Allergies    Objective    Objective       Vital Signs:   /77 (BP Location: Right arm, Patient Position: Sitting, Cuff Size: Adult)   Pulse 63   Temp 98.2 °F (36.8 °C)   Ht 161.3 cm (63.5\")   Wt 76.7 kg (169 lb 3.2 oz)   BMI 29.50 kg/m²       Physical Exam  Constitutional:       Appearance: Normal appearance.   HENT:      Head: Normocephalic and atraumatic.      Mouth/Throat:      Mouth: Mucous membranes are moist.      Pharynx: Oropharynx is clear.   Cardiovascular:      Rate and Rhythm: Normal rate and regular rhythm.   Pulmonary:      Effort: Pulmonary effort is normal. No respiratory distress.   Abdominal:      General: There is no distension.      Palpations: Abdomen is soft.      Tenderness: There is no abdominal tenderness.      Comments: Laparoscopic incisions x 4 all well-healed   Musculoskeletal:         General: No swelling. Normal range of motion.      Cervical back: Normal range of motion and neck supple.   Skin:     General: Skin is warm and dry.   Neurological:      General: No focal deficit present.      Mental " Status: She is alert and oriented to person, place, and time.   Psychiatric:         Mood and Affect: Mood normal.         Behavior: Behavior normal.                  Assessment / Plan      Diagnoses and all orders for this visit:    1. Post-operative state (Primary)    39-year-old female 2 weeks out from laparoscopic cholecystectomy.  Pathology consistent with acute on chronic cholecystitis.  Patient doing well postoperatively with no issues at this time.  Educated on lifting restrictions moving forward and she voiced understanding.  She may otherwise follow-up with me again in clinic as needed.    Follow Up   Return if symptoms worsen or fail to improve.      Patient was given instructions and counseling regarding her condition or for health maintenance advice. Please see specific information pulled into the AVS if appropriate.     Electronically signed by Vaughn Myers MD, 03/29/24, 1:54 PM EDT.

## 2024-06-12 NOTE — PROGRESS NOTES
Patient Name:  Crystal Mast  YOB: 1985  [id number]    Referring Provider: Camilla Foy A*    Patient Care Team:  Karin Wong APRN as PCP - General (Nurse Practitioner)      Chief Complaint  Cholelithiasis    Subjective     Crystal Mast is a 38 y.o. female who presents to [facility]    History of Present Illness  38-year-old female who presents today as a new referral for cholelithiasis.  Patient states that for the past 10 years she has had chronic abdominal pain.  She notes that her pain is in the epigastric region and is exacerbated with any oral intake.  She denies any right upper quadrant or right shoulder pain.  Her episodes of pain typically take around 1 day to resolve and have made her somewhat nervous to eat.  She has some associated nausea but denies any fevers or chills.  She says she has been told in the past that she has gastroesophageal reflux disease and she has tried multiple medications have not improved her symptoms.  She is never undergone evaluation with upper endoscopy in the past.  Her abdominal surgical history includes multiple C-sections.      History     Past Medical History:   Diagnosis Date    Anxiety and depression     Breast infection     Cholelithiasis     Hypertension     Insomnia     Spinal headache        Past Surgical History:   Procedure Laterality Date    BILATERAL BREAST REDUCTION      BREAST BIOPSY      Both sides    BREAST CYST ASPIRATION      BREAST RECONSTRUCTION      WITH LATISSIMUS DORSI MYOCUTANEOUS FLAP     SECTION      3 OR MORE    REDUCTION MAMMAPLASTY  2012    TUBAL ABDOMINAL LIGATION         Family History   Problem Relation Age of Onset    Hypertension Other     Hypertension Mother     Heart disease Mother     Arthritis Mother     Hypertension Father     Diabetes Father     Kidney disease Father     Arthritis Father        Social History     Tobacco Use    Smoking  status: Every Day     Packs/day: 1.00     Years: 15.00     Additional pack years: 0.00     Total pack years: 15.00     Types: Cigarettes     Start date: 10/25/2002    Smokeless tobacco: Never   Vaping Use    Vaping Use: Never used   Substance Use Topics    Alcohol use: Never    Drug use: Never       No Known Allergies    Prior to Admission medications    Medication Sig Start Date End Date Taking? Authorizing Provider   albuterol (ACCUNEB) 1.25 MG/3ML nebulizer solution Take 3 mL by nebulization Every 6 (Six) Hours As Needed for Wheezing. 10/25/21  Yes Karin Wong APRN   albuterol sulfate  (90 Base) MCG/ACT inhaler Inhale 2 puffs Every 4 (Four) Hours As Needed for Wheezing. 1/18/22  Yes Karin Wong APRN   busPIRone (BUSPAR) 5 MG tablet Take 1 tablet by mouth 3 (Three) Times a Day As Needed (anxiety). 10/25/23  Yes Camilla Foy APRN   DULoxetine (CYMBALTA) 30 MG capsule Take 1 capsule by mouth Daily. 10/25/23  Yes Camilla Foy APRN   folic acid (FOLVITE) 1 MG tablet Take 1 tablet by mouth Daily. 8/26/22  Yes Karin Wong APRN   gabapentin (NEURONTIN) 300 MG capsule Take 1 capsule by mouth 3 (Three) Times a Day. 8/10/22  Yes Alma Chapman MD   HYDROcodone-acetaminophen (NORCO) 7.5-325 MG per tablet Take 1 tablet by mouth Every 8 (Eight) Hours As Needed. for pain 8/10/22  Yes ProviderAlma MD   meloxicam (MOBIC) 15 MG tablet Take 1 tablet by mouth Daily. 8/26/22  Yes Karin Wong APRN   valACYclovir (Valtrex) 1000 MG tablet Take 1 tablet by mouth Daily. 11/19/23  Yes Camilla Foy APRN   vitamin D (ERGOCALCIFEROL) 1.25 MG (11769 UT) capsule capsule Take 1 capsule by mouth 1 (One) Time Per Week. 10/26/23  Yes Camilla Foy APRN   ipratropium (Atrovent HFA) 17 MCG/ACT inhaler Inhale 2 puffs 2 (Two) Times a Day.  Patient not taking: Reported on 11/27/2023 1/18/22   Karin Wong APRN       Objective    Objective              Vital Signs:  "  /81   Ht 161.3 cm (63.5\")   Wt 74.4 kg (164 lb)   BMI 28.60 kg/m²       Physical Exam  Constitutional:       Appearance: Normal appearance.   HENT:      Head: Normocephalic and atraumatic.      Mouth/Throat:      Mouth: Mucous membranes are moist.      Pharynx: Oropharynx is clear.   Cardiovascular:      Rate and Rhythm: Normal rate and regular rhythm.   Pulmonary:      Effort: Pulmonary effort is normal. No respiratory distress.   Abdominal:      General: There is no distension.      Palpations: Abdomen is soft.      Tenderness: There is no abdominal tenderness.      Comments: No Booth sign   Musculoskeletal:         General: No swelling. Normal range of motion.      Cervical back: Normal range of motion and neck supple.   Skin:     General: Skin is warm and dry.   Neurological:      General: No focal deficit present.      Mental Status: She is alert and oriented to person, place, and time.   Psychiatric:         Mood and Affect: Mood normal.         Behavior: Behavior normal.                Assessment / Plan      Diagnoses and all orders for this visit:    1. Calculus of gallbladder without cholecystitis without obstruction (Primary)    2. Gastroesophageal reflux disease, unspecified whether esophagitis present  -     Case Request; Standing  -     Case Request    Other orders  -     Follow Anesthesia Guidelines / Protocol; Standing  -     Obtain Informed Consent; Standing  -     Follow Anesthesia Guidelines / Protocol; Future  -     Verify NPO; Standing    38-year-old female with chronic epigastric abdominal pain.  Found to have cholelithiasis on right upper quadrant ultrasound.  She does not have any classic biliary type symptoms and for this reason would recommend evaluation first with upper endoscopy with possible biopsy.  Risks/benefits/alternatives of the procedure were explained to the patient she was agreeable to proceed.  Based on intraoperative findings of EGD, will discuss possible elective " cholecystectomy in the future.    Follow Up   Return for Post-op.      Patient was given instructions and counseling regarding her condition or for health maintenance advice. Please see specific information pulled into the AVS if appropriate.     Electronically signed by Vaughn Myers MD, 11/27/23, 10:36 AM EST.   Yes

## 2024-07-14 DIAGNOSIS — E55.9 VITAMIN D INSUFFICIENCY: ICD-10-CM

## 2024-07-22 ENCOUNTER — TRANSCRIBE ORDERS (OUTPATIENT)
Dept: LAB | Facility: HOSPITAL | Age: 39
End: 2024-07-22
Payer: COMMERCIAL

## 2024-07-22 ENCOUNTER — HOSPITAL ENCOUNTER (OUTPATIENT)
Dept: GENERAL RADIOLOGY | Facility: HOSPITAL | Age: 39
Discharge: HOME | End: 2024-07-22
Admitting: NURSE PRACTITIONER
Payer: COMMERCIAL

## 2024-07-22 DIAGNOSIS — M54.50 LOW BACK PAIN, UNSPECIFIED BACK PAIN LATERALITY, UNSPECIFIED CHRONICITY, UNSPECIFIED WHETHER SCIATICA PRESENT: Primary | ICD-10-CM

## 2024-07-22 DIAGNOSIS — M54.50 LOW BACK PAIN, UNSPECIFIED BACK PAIN LATERALITY, UNSPECIFIED CHRONICITY, UNSPECIFIED WHETHER SCIATICA PRESENT: ICD-10-CM

## 2024-07-22 PROCEDURE — 72110 X-RAY EXAM L-2 SPINE 4/>VWS: CPT

## 2024-12-12 ENCOUNTER — OFFICE VISIT (OUTPATIENT)
Dept: FAMILY MEDICINE CLINIC | Facility: CLINIC | Age: 39
End: 2024-12-12
Payer: COMMERCIAL

## 2024-12-12 VITALS
BODY MASS INDEX: 31.68 KG/M2 | DIASTOLIC BLOOD PRESSURE: 86 MMHG | WEIGHT: 181.7 LBS | OXYGEN SATURATION: 96 % | HEART RATE: 85 BPM | SYSTOLIC BLOOD PRESSURE: 124 MMHG | TEMPERATURE: 98.1 F

## 2024-12-12 DIAGNOSIS — M79.7 FIBROMYALGIA: ICD-10-CM

## 2024-12-12 DIAGNOSIS — R06.02 SOB (SHORTNESS OF BREATH) ON EXERTION: ICD-10-CM

## 2024-12-12 DIAGNOSIS — F32.9 MAJOR DEPRESSIVE DISORDER, SINGLE EPISODE, UNSPECIFIED: ICD-10-CM

## 2024-12-12 DIAGNOSIS — K42.9 UMBILICAL HERNIA WITHOUT OBSTRUCTION AND WITHOUT GANGRENE: Primary | ICD-10-CM

## 2024-12-12 PROCEDURE — 3074F SYST BP LT 130 MM HG: CPT | Performed by: NURSE PRACTITIONER

## 2024-12-12 PROCEDURE — 1125F AMNT PAIN NOTED PAIN PRSNT: CPT | Performed by: NURSE PRACTITIONER

## 2024-12-12 PROCEDURE — 1160F RVW MEDS BY RX/DR IN RCRD: CPT | Performed by: NURSE PRACTITIONER

## 2024-12-12 PROCEDURE — 3079F DIAST BP 80-89 MM HG: CPT | Performed by: NURSE PRACTITIONER

## 2024-12-12 PROCEDURE — 99214 OFFICE O/P EST MOD 30 MIN: CPT | Performed by: NURSE PRACTITIONER

## 2024-12-12 PROCEDURE — 1159F MED LIST DOCD IN RCRD: CPT | Performed by: NURSE PRACTITIONER

## 2024-12-12 RX ORDER — DULOXETIN HYDROCHLORIDE 30 MG/1
30 CAPSULE, DELAYED RELEASE ORAL DAILY
Qty: 30 CAPSULE | Refills: 1 | Status: SHIPPED | OUTPATIENT
Start: 2024-12-12

## 2024-12-12 RX ORDER — ALBUTEROL SULFATE 90 UG/1
2 INHALANT RESPIRATORY (INHALATION) EVERY 4 HOURS PRN
Qty: 6.7 G | Refills: 0 | Status: SHIPPED | OUTPATIENT
Start: 2024-12-12

## 2024-12-12 RX ORDER — CELECOXIB 200 MG/1
1 CAPSULE ORAL DAILY
COMMUNITY
Start: 2024-11-22

## 2024-12-12 NOTE — PROGRESS NOTES
"Chief Complaint  Abdominal Pain (Was doing some heavy lifting at work Tuesday night and since then has been having some pain and now has a bradley above her belly button )    PHQ-9 Total Score: 9    History of Present Illness  Crystal Mast is a 39 y.o. female who presents to [facility] with a past medical history of  Past Medical History:   Diagnosis Date   • Anxiety and depression    • Breast infection    • Cholelithiasis 2023   • Hypertension 2020   • Insomnia    • Spinal headache 2008    WITH EPIDURAL  X 3       HPI     The patient is a 39-year-old female who presents for evaluation of abdominal pain.    She experienced severe abdominal pain during a lifting activity at work, accompanied by the sensation of a \"pop\" in her abdomen. Upon palpation, she detected a knot-like structure above her umbilicus, which she was able to reposition with slight pressure. She suspects this to be a hernia. The area remains tender to touch, but she reports no discoloration. She has been able to manually reduce the hernia on several occasions. She also reports difficulty in performing certain activities such as sitting up from bed. She recalls a similar incident during her pregnancy 9 years ago, but it resolved post-delivery. She underwent cholecystectomy on 03/14/2024, after which she was advised to avoid strenuous activities to prevent hernia formation. Despite this advice, she resumed work 3 days post-surgery, although without engaging in heavy lifting.    She has been experiencing persistent pain across her abdomen and back since the onset of the hernia symptoms. She attributes this to improper lifting techniques at work, where she regularly handles large water jugs. She expresses a preference to consult with Dr. Myers, who performed her gallbladder surgery.    She feels like her depression has gotten better over the years and is doing okay without medication. She would almost like to have the " Cymbalta back but has a very bad memory and can not remember to take it every day. She has tried putting an alarm in her phone, but she has so many alarms because she works night shifts and they remind her to do stuff before she goes to work.    She is no longer taking albuterol but feels she still needs the albuterol inhaler occasionally. She has no history of asthma but has noticed that she probably has it from something because there are a lot of times she gets choked up and can not breathe, especially when she goes outside and the cold air hits her.    Supplemental Information  She is no longer taking BuSpar, Celebrex, vitamin D, Cymbalta, folic acid, and Valtrex. She slowly weaned herself off of everything. She is taking gabapentin, which is prescribed by her pain management doctor, along with Norco and Celebrex. They took her off meloxicam and put her on Celebrex.    MEDICATIONS  Discontinued: albuterol, BuSpar, Celebrex, vitamin D, Cymbalta, folic acid, Valtrex, meloxicam  Current: gabapentin, Norco       Objective   Vital Signs:   Vitals:    12/12/24 1303   BP: 124/86   BP Location: Left arm   Patient Position: Sitting   Pulse: 85   Temp: 98.1 °F (36.7 °C)   SpO2: 96%   Weight: 82.4 kg (181 lb 11.2 oz)     Body mass index is 31.68 kg/m².    Wt Readings from Last 3 Encounters:   12/12/24 82.4 kg (181 lb 11.2 oz)   03/29/24 76.7 kg (169 lb 3.2 oz)   02/02/24 76 kg (167 lb 8.8 oz)     BP Readings from Last 3 Encounters:   12/12/24 124/86   03/29/24 129/77   03/14/24 111/70       Health Maintenance   Topic Date Due   • ANNUAL PHYSICAL  Never done   • INFLUENZA VACCINE  03/31/2025 (Originally 7/1/2024)   • COVID-19 Vaccine (1 - 2024-25 season) 12/10/2025 (Originally 9/1/2024)   • Pneumococcal Vaccine 0-64 (1 of 2 - PCV) 12/12/2025 (Originally 3/25/1992)   • TDAP/TD VACCINES (2 - Td or Tdap) 09/01/2025   • BMI FOLLOWUP  12/12/2025   • PAP SMEAR  10/25/2026   • HEPATITIS C SCREENING  Completed       Physical  Exam  Vitals reviewed.   Constitutional:       General: She is not in acute distress.     Appearance: Normal appearance. She is well-developed.   HENT:      Head: Normocephalic and atraumatic.   Eyes:      Conjunctiva/sclera: Conjunctivae normal.      Pupils: Pupils are equal, round, and reactive to light.   Cardiovascular:      Rate and Rhythm: Normal rate and regular rhythm.      Heart sounds: Normal heart sounds.   Pulmonary:      Effort: Pulmonary effort is normal.      Breath sounds: Normal breath sounds.   Abdominal:      Hernia: A hernia is present. Hernia is present in the umbilical area.       Musculoskeletal:      Right lower leg: No edema.      Left lower leg: No edema.   Skin:     General: Skin is warm and dry.   Neurological:      Mental Status: She is alert and oriented to person, place, and time.   Psychiatric:         Mood and Affect: Mood and affect normal.         Behavior: Behavior normal.         Thought Content: Thought content normal.         Judgment: Judgment normal.          Result Review :  The following data was reviewed by: PHYLLIS Cool on 12/12/2024:  Results       Procedures        Assessment and Plan   Diagnoses and all orders for this visit:    1. Umbilical hernia without obstruction and without gangrene (Primary)  -     Ambulatory Referral to General Surgery    2. SOB (shortness of breath) on exertion  -     albuterol sulfate  (90 Base) MCG/ACT inhaler; Inhale 2 puffs Every 4 (Four) Hours As Needed for Wheezing.  Dispense: 6.7 g; Refill: 0    3. Major depressive disorder, single episode, unspecified  -     DULoxetine (CYMBALTA) 30 MG capsule; Take 1 capsule by mouth Daily.  Dispense: 30 capsule; Refill: 1    4. Fibromyalgia  -     DULoxetine (CYMBALTA) 30 MG capsule; Take 1 capsule by mouth Daily.  Dispense: 30 capsule; Refill: 1         1. Umbilical hernia.  The patient reports a painful knot above her belly button that she can push back in, consistent with a  reducible umbilical hernia. She is advised to avoid heavy lifting and activities that may strain the abdomen. A referral to general surgery will be initiated for further evaluation and management. If the hernia becomes non-reducible or shows signs of dark coloration or redness, she should seek immediate medical attention.    2. Depression.  The patient reports experiencing symptoms of depression several days a week, including feeling down, having little interest or pleasure in doing things, and feeling tired more than half the days. She is advised to monitor for any worsening of depression or emergence of suicidal thoughts upon restarting the medication. Cymbalta 30 mg once daily will be reintroduced. If such symptoms occur, she should seek immediate medical attention through either [facility] or the ER.    3. Asthma.  The patient reports occasional episodes of difficulty breathing, especially when exposed to cold air. A prescription for albuterol will be provided for use as needed.    Follow-up  The patient will follow up in 1 month to assess her response to the reintroduced Cymbalta.    PROCEDURE  The patient underwent cholecystectomy on 03/14/2024.     BMI is >= 30 and <35. (Class 1 Obesity). The following options were offered after discussion;: weight loss educational material (shared in after visit summary)         FOLLOW UP  Return in about 4 weeks (around 1/9/2025) for Recheck.    Patient was given instructions and counseling regarding her condition or for health maintenance advice. Please see specific information pulled into the AVS if appropriate.       Karin Wong, APRN  12/12/24  13:43 EST    CURRENT & DISCONTINUED MEDICATIONS  Current Outpatient Medications   Medication Instructions   • albuterol sulfate  (90 Base) MCG/ACT inhaler 2 puffs, Inhalation, Every 4 Hours PRN   • celecoxib (CeleBREX) 200 MG capsule 1 capsule, Daily   • DULoxetine (CYMBALTA) 30 mg, Oral, Daily   • gabapentin  (NEURONTIN) 300 mg, 3 Times Daily   • HYDROcodone-acetaminophen (NORCO) 7.5-325 MG per tablet 1 tablet, Oral, Every 6 Hours PRN       Medications Discontinued During This Encounter   Medication Reason   • meloxicam (MOBIC) 15 MG tablet Alternate therapy   • albuterol (ACCUNEB) 1.25 MG/3ML nebulizer solution Historical Med - Therapy completed   • folic acid (FOLVITE) 1 MG tablet Historical Med - Therapy completed   • busPIRone (BUSPAR) 5 MG tablet Historical Med - Therapy completed   • DULoxetine (CYMBALTA) 30 MG capsule Historical Med - Therapy completed   • valACYclovir (Valtrex) 1000 MG tablet Historical Med - Therapy completed   • Cholecalciferol (vitamin D3) 125 MCG (5000 UT) tablet tablet Historical Med - Therapy completed   • albuterol sulfate  (90 Base) MCG/ACT inhaler Reorder        Patient or patient representative verbalized consent for the use of Ambient Listening during the visit with  PHYLLIS Cool for chart documentation. 12/12/2024  13:40 EST

## 2024-12-27 ENCOUNTER — OFFICE VISIT (OUTPATIENT)
Dept: FAMILY MEDICINE CLINIC | Facility: CLINIC | Age: 39
End: 2024-12-27
Payer: COMMERCIAL

## 2024-12-27 VITALS
WEIGHT: 181.2 LBS | OXYGEN SATURATION: 98 % | DIASTOLIC BLOOD PRESSURE: 75 MMHG | HEART RATE: 103 BPM | SYSTOLIC BLOOD PRESSURE: 100 MMHG | TEMPERATURE: 98.2 F | BODY MASS INDEX: 31.59 KG/M2

## 2024-12-27 DIAGNOSIS — J02.0 STREP PHARYNGITIS: ICD-10-CM

## 2024-12-27 DIAGNOSIS — J10.1 INFLUENZA A: ICD-10-CM

## 2024-12-27 DIAGNOSIS — J06.9 UPPER RESPIRATORY TRACT INFECTION, UNSPECIFIED TYPE: Primary | ICD-10-CM

## 2024-12-27 LAB
EXPIRATION DATE: ABNORMAL
EXPIRATION DATE: ABNORMAL
FLUAV AG UPPER RESP QL IA.RAPID: DETECTED
FLUBV AG UPPER RESP QL IA.RAPID: NOT DETECTED
INTERNAL CONTROL: ABNORMAL
INTERNAL CONTROL: ABNORMAL
Lab: ABNORMAL
Lab: ABNORMAL
S PYO AG THROAT QL: POSITIVE
SARS-COV-2 AG UPPER RESP QL IA.RAPID: NOT DETECTED

## 2024-12-27 PROCEDURE — 99213 OFFICE O/P EST LOW 20 MIN: CPT | Performed by: FAMILY MEDICINE

## 2024-12-27 PROCEDURE — 3078F DIAST BP <80 MM HG: CPT | Performed by: FAMILY MEDICINE

## 2024-12-27 PROCEDURE — 1125F AMNT PAIN NOTED PAIN PRSNT: CPT | Performed by: FAMILY MEDICINE

## 2024-12-27 PROCEDURE — 3074F SYST BP LT 130 MM HG: CPT | Performed by: FAMILY MEDICINE

## 2024-12-27 PROCEDURE — 87428 SARSCOV & INF VIR A&B AG IA: CPT | Performed by: FAMILY MEDICINE

## 2024-12-27 PROCEDURE — 87880 STREP A ASSAY W/OPTIC: CPT | Performed by: FAMILY MEDICINE

## 2024-12-27 RX ORDER — OSELTAMIVIR PHOSPHATE 75 MG/1
75 CAPSULE ORAL 2 TIMES DAILY
Qty: 10 CAPSULE | Refills: 0 | Status: SHIPPED | OUTPATIENT
Start: 2024-12-27 | End: 2025-01-01

## 2024-12-27 RX ORDER — AMOXICILLIN 500 MG/1
500 CAPSULE ORAL 2 TIMES DAILY
Qty: 20 CAPSULE | Refills: 0 | Status: SHIPPED | OUTPATIENT
Start: 2024-12-27 | End: 2025-01-06

## 2024-12-27 NOTE — PROGRESS NOTES
Chief Complaint    Generalized Body Aches (Headache, fever, congestion ) and Shortness of Breath    Subjective      Crystal Mast presents to [facility]    URI   This is a new problem. The current episode started in the past 7 days. The problem has been gradually worsening. The maximum temperature recorded prior to her arrival was 102 - 102.9 F. The fever has been present for 1 to 2 days. Associated symptoms include congestion, coughing, joint pain and a sore throat. Treatments tried: BREN - hx of asthma with increased use of BREN.     Objective     Vital Signs:     /75   Pulse 103   Temp 98.2 °F (36.8 °C)   Wt 82.2 kg (181 lb 3.2 oz)   SpO2 98%   BMI 31.59 kg/m²       Physical Exam  Vitals reviewed.   Constitutional:       General: She is not in acute distress.     Appearance: Normal appearance. She is well-developed.   HENT:      Head: Normocephalic and atraumatic.      Right Ear: Hearing and external ear normal.      Left Ear: Hearing and external ear normal.      Nose: Nose normal.   Eyes:      General: Lids are normal.         Right eye: No discharge.         Left eye: No discharge.      Conjunctiva/sclera: Conjunctivae normal.   Pulmonary:      Effort: Pulmonary effort is normal.   Abdominal:      General: There is no distension.   Musculoskeletal:         General: No swelling.      Cervical back: Neck supple.   Skin:     Coloration: Skin is not jaundiced.      Findings: No erythema.   Neurological:      Mental Status: She is alert. Mental status is at baseline.   Psychiatric:         Mood and Affect: Mood and affect normal.         Thought Content: Thought content normal.     Assessment and Plan     Diagnoses and all orders for this visit:    1. Upper respiratory tract infection, unspecified type (Primary)  Comments:  Recommend adequate fluids and rest.  Acetaminophen as needed.  Adequate fluids, rest, nutrition and sleep.  Orders:  -     POCT rapid strep A  -      Medicine POCT SARS-CoV-2 Antigen CASSANDRA + Flu    2. Strep pharyngitis  Comments:  Start ABX.  Call with any alarming symptoms.    3. Influenza A  Comments:  Start Tamiflu.  Call with any alarming symptoms.    Other orders  -     amoxicillin (AMOXIL) 500 MG capsule; Take 1 capsule by mouth 2 (Two) Times a Day for 10 days.  Dispense: 20 capsule; Refill: 0  -     oseltamivir (Tamiflu) 75 MG capsule; Take 1 capsule by mouth 2 (Two) Times a Day for 5 days.  Dispense: 10 capsule; Refill: 0    Follow Up     Return if symptoms worsen or fail to improve.    Patient was given instructions and counseling regarding her condition or for health maintenance advice. Please see specific information pulled into the AVS if appropriate.

## 2024-12-30 ENCOUNTER — OFFICE VISIT (OUTPATIENT)
Dept: SURGERY | Facility: CLINIC | Age: 39
End: 2024-12-30
Payer: COMMERCIAL

## 2024-12-30 DIAGNOSIS — Z53.21 PATIENT LEFT WITHOUT BEING SEEN: Primary | ICD-10-CM

## 2025-01-02 NOTE — PROGRESS NOTES
The patient left the office before care was provided and did not complete the visit  secondary to positive flu testing .

## 2025-01-03 ENCOUNTER — OFFICE VISIT (OUTPATIENT)
Dept: SURGERY | Facility: CLINIC | Age: 40
End: 2025-01-03
Payer: COMMERCIAL

## 2025-01-03 VITALS
WEIGHT: 185.6 LBS | HEIGHT: 64 IN | SYSTOLIC BLOOD PRESSURE: 144 MMHG | HEART RATE: 70 BPM | BODY MASS INDEX: 31.69 KG/M2 | DIASTOLIC BLOOD PRESSURE: 96 MMHG

## 2025-01-03 DIAGNOSIS — K43.2 INCISIONAL HERNIA, WITHOUT OBSTRUCTION OR GANGRENE: ICD-10-CM

## 2025-01-03 DIAGNOSIS — Z72.0 TOBACCO USE: Primary | ICD-10-CM

## 2025-01-03 RX ORDER — HYDROCODONE BITARTRATE AND ACETAMINOPHEN 7.5; 325 MG/1; MG/1
1 TABLET ORAL EVERY 6 HOURS PRN
COMMUNITY

## 2025-01-03 RX ORDER — SODIUM CHLORIDE 9 MG/ML
40 INJECTION, SOLUTION INTRAVENOUS AS NEEDED
OUTPATIENT
Start: 2025-01-03

## 2025-01-03 RX ORDER — ONDANSETRON 2 MG/ML
4 INJECTION INTRAMUSCULAR; INTRAVENOUS EVERY 6 HOURS PRN
OUTPATIENT
Start: 2025-01-03

## 2025-01-03 RX ORDER — SODIUM CHLORIDE 0.9 % (FLUSH) 0.9 %
10 SYRINGE (ML) INJECTION EVERY 12 HOURS SCHEDULED
OUTPATIENT
Start: 2025-01-03

## 2025-01-03 RX ORDER — SODIUM CHLORIDE 0.9 % (FLUSH) 0.9 %
10 SYRINGE (ML) INJECTION AS NEEDED
OUTPATIENT
Start: 2025-01-03

## 2025-01-03 NOTE — PROGRESS NOTES
"Chief Complaint  Hernia (UMBILICAL)    Subjective    Subjective     Crystal Mast is a 39 y.o. female who presents to [facility]    History of Present Illness  39-year-old female who presents today for evaluation of an incisional hernia at the umbilicus.  Patient is previously known to me and underwent an elective cholecystectomy in 2023.  She returned to work and a short time ago noticed some pain and a small bulge around her umbilicus when lifting heavy objects.  Since that time she has been able to easily manually reduce the bulge, but does note that it causes her daily pain.  She denies any nausea, vomiting, fevers, chills, or other symptoms suggestive of obstruction.  She continues to smoke.  She desires operative repair.  Hernia      Personal History     Social History     Tobacco Use    Smoking status: Every Day     Current packs/day: 1.00     Average packs/day: 1 pack/day for 22.2 years (22.2 ttl pk-yrs)     Types: Cigarettes     Start date: 10/25/2002    Smokeless tobacco: Never   Vaping Use    Vaping status: Never Used   Substance Use Topics    Alcohol use: Never    Drug use: Never     No Known Allergies    Objective    Objective       Vital Signs:   /96 (BP Location: Left arm, Patient Position: Sitting, Cuff Size: Adult)   Pulse 70   Ht 161.3 cm (63.5\")   Wt 84.2 kg (185 lb 9.6 oz)   BMI 32.36 kg/m²       Physical Exam  Constitutional:       Appearance: Normal appearance.   HENT:      Head: Normocephalic and atraumatic.      Mouth/Throat:      Mouth: Mucous membranes are moist.      Pharynx: Oropharynx is clear.   Cardiovascular:      Rate and Rhythm: Normal rate and regular rhythm.   Pulmonary:      Effort: Pulmonary effort is normal. No respiratory distress.   Abdominal:      General: There is no distension.      Palpations: Abdomen is soft.      Tenderness: There is no abdominal tenderness.      Comments: Small, easily reducible incisional hernia at the " umbilicus without overlying skin changes   Musculoskeletal:         General: No swelling. Normal range of motion.      Cervical back: Normal range of motion and neck supple.   Skin:     General: Skin is warm and dry.   Neurological:      General: No focal deficit present.      Mental Status: She is alert and oriented to person, place, and time.   Psychiatric:         Mood and Affect: Mood normal.         Behavior: Behavior normal.                  Assessment / Plan      Diagnoses and all orders for this visit:    1. Tobacco use (Primary)    2. Incisional hernia, without obstruction or gangrene  -     Case Request; Standing  -     sodium chloride 0.9 % flush 10 mL  -     sodium chloride 0.9 % flush 10 mL  -     sodium chloride 0.9 % infusion 40 mL  -     ceFAZolin (ANCEF) 2,000 mg in sodium chloride 0.9 % 100 mL IVPB  -     ondansetron (ZOFRAN) injection 4 mg  -     Case Request    Other orders  -     Follow Anesthesia Guidelines / Protocol; Future  -     Follow Anesthesia Guidelines / Protocol; Standing  -     Verify NPO Status; Standing  -     Verify / Perform Chlorhexidine Skin Prep; Standing  -     Provide NPO Instructions to Patient; Future  -     Chlorhexidine Skin Prep; Future  -     Insert Peripheral IV; Standing  -     Saline Lock & Maintain IV Access; Standing  -     Place Sequential Compression Device; Standing  -     Maintain Sequential Compression Device; Standing    39-year-old female with symptomatic incisional hernia at the umbilicus from previous laparoscopic cholecystectomy.  She is increased risk for hernia recurrence given her current BMI as well as smoking status and she voiced understanding to this.  Will plan for outpatient robotic assisted possible open incisional hernia repair with mesh placement and any other indicated procedure.  Risks/benefits/alternatives of the procedure were explained to the patient and she was agreeable to proceed.    Follow Up   Return for Post-op.      Patient was  given instructions and counseling regarding her condition or for health maintenance advice. Please see specific information pulled into the AVS if appropriate.     Electronically signed by Vaughn Myers MD, 01/03/25, 1:52 PM EST.

## 2025-01-09 ENCOUNTER — OFFICE VISIT (OUTPATIENT)
Dept: FAMILY MEDICINE CLINIC | Facility: CLINIC | Age: 40
End: 2025-01-09
Payer: COMMERCIAL

## 2025-01-09 VITALS
DIASTOLIC BLOOD PRESSURE: 80 MMHG | BODY MASS INDEX: 31.44 KG/M2 | OXYGEN SATURATION: 96 % | HEART RATE: 122 BPM | SYSTOLIC BLOOD PRESSURE: 120 MMHG | TEMPERATURE: 97.2 F | WEIGHT: 180.3 LBS

## 2025-01-09 DIAGNOSIS — J06.9 UPPER RESPIRATORY TRACT INFECTION, UNSPECIFIED TYPE: ICD-10-CM

## 2025-01-09 DIAGNOSIS — J02.0 STREP PHARYNGITIS: Primary | ICD-10-CM

## 2025-01-09 DIAGNOSIS — J02.9 SORE THROAT: ICD-10-CM

## 2025-01-09 LAB
EXPIRATION DATE: ABNORMAL
EXPIRATION DATE: NORMAL
FLUAV AG UPPER RESP QL IA.RAPID: NOT DETECTED
FLUBV AG UPPER RESP QL IA.RAPID: NOT DETECTED
INTERNAL CONTROL: ABNORMAL
INTERNAL CONTROL: NORMAL
Lab: ABNORMAL
Lab: NORMAL
S PYO AG THROAT QL: POSITIVE
SARS-COV-2 AG UPPER RESP QL IA.RAPID: NOT DETECTED

## 2025-01-09 PROCEDURE — 1159F MED LIST DOCD IN RCRD: CPT | Performed by: STUDENT IN AN ORGANIZED HEALTH CARE EDUCATION/TRAINING PROGRAM

## 2025-01-09 PROCEDURE — 3074F SYST BP LT 130 MM HG: CPT | Performed by: STUDENT IN AN ORGANIZED HEALTH CARE EDUCATION/TRAINING PROGRAM

## 2025-01-09 PROCEDURE — 87428 SARSCOV & INF VIR A&B AG IA: CPT | Performed by: STUDENT IN AN ORGANIZED HEALTH CARE EDUCATION/TRAINING PROGRAM

## 2025-01-09 PROCEDURE — 1125F AMNT PAIN NOTED PAIN PRSNT: CPT | Performed by: STUDENT IN AN ORGANIZED HEALTH CARE EDUCATION/TRAINING PROGRAM

## 2025-01-09 PROCEDURE — 99213 OFFICE O/P EST LOW 20 MIN: CPT | Performed by: STUDENT IN AN ORGANIZED HEALTH CARE EDUCATION/TRAINING PROGRAM

## 2025-01-09 PROCEDURE — 1160F RVW MEDS BY RX/DR IN RCRD: CPT | Performed by: STUDENT IN AN ORGANIZED HEALTH CARE EDUCATION/TRAINING PROGRAM

## 2025-01-09 PROCEDURE — 3079F DIAST BP 80-89 MM HG: CPT | Performed by: STUDENT IN AN ORGANIZED HEALTH CARE EDUCATION/TRAINING PROGRAM

## 2025-01-09 PROCEDURE — 87880 STREP A ASSAY W/OPTIC: CPT | Performed by: STUDENT IN AN ORGANIZED HEALTH CARE EDUCATION/TRAINING PROGRAM

## 2025-01-09 NOTE — PROGRESS NOTES
Chief Complaint  Sore Throat and URI    Subjective      Crystal Mast is a 39 y.o. female who presents to [facility] with an acute complaint of a sore throat that started when she woke up on Tuesday after working a night shift.  Since then it has just gotten worse, says it feels like razor blades in her throat.  She tells me that she just recently finished amoxicillin after being diagnosed with strep 2 weeks ago, she was improved for only a few days before she started to feel bad again.  Following that her son tested positive for strep.  She also complains of some upper respiratory symptoms including congestion and postnasal drainage.    No fever, rashes, skin changes, nausea, vomiting, diarrhea, constipation, or any other associated symptoms.    Objective   Vital Signs:   Vitals:    01/09/25 1507   BP: 120/80   Pulse: (!) 122   Temp: 97.2 °F (36.2 °C)   SpO2: 96%   Weight: 81.8 kg (180 lb 4.8 oz)   PainSc:   8   PainLoc: Back     Body mass index is 31.44 kg/m².    Wt Readings from Last 3 Encounters:   01/09/25 81.8 kg (180 lb 4.8 oz)   01/03/25 84.2 kg (185 lb 9.6 oz)   12/27/24 82.2 kg (181 lb 3.2 oz)     BP Readings from Last 3 Encounters:   01/09/25 120/80   01/03/25 144/96   12/27/24 100/75       Health Maintenance   Topic Date Due    ANNUAL PHYSICAL  Never done    INFLUENZA VACCINE  03/31/2025 (Originally 7/1/2024)    COVID-19 Vaccine (1 - 2024-25 season) 12/10/2025 (Originally 9/1/2024)    Pneumococcal Vaccine 0-64 (1 of 2 - PCV) 12/12/2025 (Originally 3/25/1992)    TDAP/TD VACCINES (2 - Td or Tdap) 09/01/2025    BMI FOLLOWUP  12/12/2025    PAP SMEAR  10/25/2026    HEPATITIS C SCREENING  Completed       Physical Exam  HENT:      Head: Normocephalic and atraumatic.      Ears:      Comments: Moderate middle ear effusions with the right worse than the left     Mouth/Throat:      Pharynx: Oropharyngeal exudate and posterior oropharyngeal erythema present.   Eyes:       Conjunctiva/sclera: Conjunctivae normal.   Cardiovascular:      Rate and Rhythm: Regular rhythm. Tachycardia present.      Heart sounds: Normal heart sounds.   Pulmonary:      Effort: Pulmonary effort is normal.      Breath sounds: Normal breath sounds.   Musculoskeletal:         General: Normal range of motion.      Cervical back: Normal range of motion and neck supple.   Skin:     General: Skin is warm and dry.   Neurological:      General: No focal deficit present.      Mental Status: She is alert.          Result Review :  The following data was reviewed by: Rupa Pizano DO on 01/09/2025:       Office Visit on 01/09/2025   Component Date Value    SARS Antigen 01/09/2025 Not Detected     Influenza A Antigen CASSANDRA 01/09/2025 Not Detected     Influenza B Antigen CASSANDRA 01/09/2025 Not Detected     Internal Control 01/09/2025 Passed     Lot Number 01/09/2025 [id number]     Expiration Date 01/09/2025 7-     Rapid Strep A Screen 01/09/2025 Positive (A)     Internal Control 01/09/2025 Passed     Lot Number 01/09/2025 [id number]     Expiration Date 01/09/2025 11,[phone]      Procedures          ASSESSMENT/PLAN  Diagnoses and all orders for this visit:    1. Strep pharyngitis (Primary)  -     amoxicillin-clavulanate (AUGMENTIN) 875-125 MG per tablet; Take 1 tablet by mouth 2 (Two) Times a Day for 7 days.  Dispense: 14 tablet; Refill: 0    2. Sore throat  -     POCT SARS-CoV-2 + Flu Antigen CASSANDRA  -     POC Rapid Strep A    3. Upper respiratory tract infection, unspecified type  -     POCT SARS-CoV-2 + Flu Antigen CASSANDRA  -     POC Rapid Strep A      Group A strep screen positive today.  Her exam does appear further to be exudate in the pharynx particularly on the right tonsil.  She is symptomatic so I doubt this is her being colonized or test remaining positive from infection 2 weeks ago.  Will start Augmentin course today twice a day for 7 days.    Advised supportive measures such as hydration and rest. Can do OTC  medications such as an antihistamine, cough suppressant, and/or decongestant if needed. Honey can be soothing to a sore throat. Return if new or worsening symptoms.             FOLLOW UP  Return if symptoms worsen or fail to improve.  Patient was given instructions and counseling regarding her condition or for health maintenance advice. Please see specific information pulled into the AVS if appropriate.       Rupa Pizano DO  01/10/25  09:18 EST

## 2025-02-26 NOTE — PRE-PROCEDURE INSTRUCTIONS
PATIENT INSTRUCTED TO BE:    - NOTHING TO EAT AFTER MIDNIGHT OR CHEW, EXCEPT CAN HAVE CLEAR LIQUIDS 2 HOURS PRIOR TO SURGERY ARRIVAL TIME , NO MORE THAN 8 OZ. (NOTHING RED)     - TO HOLD ALL VITAMINS, SUPPLEMENTS, NSAIDS FOR ONE WEEK PRIOR TO THEIR SURGICAL PROCEDURE        - BATHING INSTRUCTIONS GIVEN    INSTRUCTED NO LOTIONS, JEWELRY, PIERCINGS,  NAIL POLISH, OR DEODORANT DAY OF SURGERY        -INSTRUCTED TO TAKE THE FOLLOWING MEDICATIONS THE DAY OF SURGERY WITH SIPS OF WATER:   Norco, Gabapentin, Cymbalta  Albuterol inhaler if needed        - DO NOT BRING ANY MEDICATIONS WITH YOU TO THE HOSPITAL THE DAY OF SURGERY, EXCEPT IF USE INHALERS. BRING INHALERS DAY OF SURGERY       - BRING CPAP OR BIPAP TO THE HOSPITAL ONLY IF YOU ARE SPENDING THE NIGHT    - DO NOT SMOKE OR VAPE 24 HOURS PRIOR TO PROCEDURE PER ANESTHESIA REQUEST     -MAKE SURE YOU HAVE A RIDE HOME OR SOMEONE TO STAY WITH YOU THE DAY OF THE PROCEDURE AFTER YOU GO HOME     - FOLLOW ANY OTHER INSTRUCTIONS GIVEN TO YOU BY YOUR SURGEON'S OFFICE.     Main Entrance [facility], Take elevator to first floor, turn left and check in at registration   - YOU WILL RECEIVE A PHONE CALL THE DAY PRIOR TO SURGERY BETWEEN 1PM AND 4 PM WITH ARRIVAL TIME, IF YOUR SURGERY IS ON A MONDAY YOU WILL RECEIVE A CALL THE FRIDAY PRIOR TO SURGERY DATE    - BRING CASH OR CREDIT CARD FOR COPAYMENT OF MEDICATIONS AFTER SURGERY IF YOU USE THE HOSPITAL PHARMACY (MEDS TO BED)    - PREADMISSION TESTING NURSE EVELYN URBINA [phone] IF HAVE ANY QUESTIONS     -PATIENT PROVIDED THE NUMBER FOR PREOP SURGICAL DEPT IF HAD QUESTIONS AFTER HOURS PRIOR TO SURGERY ([phone]).  INFORMED PT IF NO ANSWER, LEAVE A MESSAGE AND SOMEONE WILL RETURN THEIR CALL       PATIENT VERBALIZED UNDERSTANDING

## 2025-02-27 ENCOUNTER — ANESTHESIA (OUTPATIENT)
Dept: PERIOP | Facility: HOSPITAL | Age: 40
End: 2025-02-27
Payer: COMMERCIAL

## 2025-02-27 ENCOUNTER — HOSPITAL ENCOUNTER (OUTPATIENT)
Facility: HOSPITAL | Age: 40
Setting detail: HOSPITAL OUTPATIENT SURGERY
Discharge: HOME | End: 2025-02-27
Attending: STUDENT IN AN ORGANIZED HEALTH CARE EDUCATION/TRAINING PROGRAM | Admitting: STUDENT IN AN ORGANIZED HEALTH CARE EDUCATION/TRAINING PROGRAM
Payer: COMMERCIAL

## 2025-02-27 ENCOUNTER — ANESTHESIA EVENT (OUTPATIENT)
Dept: PERIOP | Facility: HOSPITAL | Age: 40
End: 2025-02-27
Payer: COMMERCIAL

## 2025-02-27 VITALS
OXYGEN SATURATION: 94 % | HEART RATE: 75 BPM | HEIGHT: 65 IN | WEIGHT: 176.81 LBS | DIASTOLIC BLOOD PRESSURE: 83 MMHG | TEMPERATURE: 97.1 F | RESPIRATION RATE: 16 BRPM | SYSTOLIC BLOOD PRESSURE: 122 MMHG | BODY MASS INDEX: 29.46 KG/M2

## 2025-02-27 DIAGNOSIS — K43.2 INCISIONAL HERNIA, WITHOUT OBSTRUCTION OR GANGRENE: ICD-10-CM

## 2025-02-27 PROCEDURE — 25010000002 PROPOFOL 10 MG/ML EMULSION

## 2025-02-27 PROCEDURE — 25010000002 HYDROMORPHONE 1 MG/ML SOLUTION

## 2025-02-27 PROCEDURE — C1781 MESH (IMPLANTABLE): HCPCS | Performed by: STUDENT IN AN ORGANIZED HEALTH CARE EDUCATION/TRAINING PROGRAM

## 2025-02-27 PROCEDURE — 25010000002 SUGAMMADEX 200 MG/2ML SOLUTION

## 2025-02-27 PROCEDURE — 25010000002 CEFAZOLIN PER 500 MG: Performed by: STUDENT IN AN ORGANIZED HEALTH CARE EDUCATION/TRAINING PROGRAM

## 2025-02-27 PROCEDURE — 25010000002 LIDOCAINE PF 2% 2 % SOLUTION

## 2025-02-27 PROCEDURE — 49591 RPR AA HRN 1ST < 3 CM RDC: CPT | Performed by: STUDENT IN AN ORGANIZED HEALTH CARE EDUCATION/TRAINING PROGRAM

## 2025-02-27 PROCEDURE — 25810000003 LACTATED RINGERS PER 1000 ML: Performed by: ANESTHESIOLOGY

## 2025-02-27 PROCEDURE — 25010000002 MIDAZOLAM PER 1MG: Performed by: ANESTHESIOLOGY

## 2025-02-27 PROCEDURE — 25010000002 LIDOCAINE 1% - EPINEPHRINE 1:100000 1 %-1:100000 SOLUTION: Performed by: STUDENT IN AN ORGANIZED HEALTH CARE EDUCATION/TRAINING PROGRAM

## 2025-02-27 PROCEDURE — 25010000002 FENTANYL CITRATE (PF) 50 MCG/ML SOLUTION

## 2025-02-27 PROCEDURE — 25010000002 DEXAMETHASONE PER 1 MG

## 2025-02-27 PROCEDURE — 25010000002 KETOROLAC TROMETHAMINE PER 15 MG

## 2025-02-27 DEVICE — VENTRALEX ST HERNIA PATCH, 4.3 CM (1.7"), CIRCLE
Type: IMPLANTABLE DEVICE | Site: ABDOMEN | Status: FUNCTIONAL
Brand: VENTRALEX

## 2025-02-27 RX ORDER — DEXAMETHASONE SODIUM PHOSPHATE 4 MG/ML
INJECTION, SOLUTION INTRA-ARTICULAR; INTRALESIONAL; INTRAMUSCULAR; INTRAVENOUS; SOFT TISSUE AS NEEDED
Status: DISCONTINUED | OUTPATIENT
Start: 2025-02-27 | End: 2025-02-27 | Stop reason: SURG

## 2025-02-27 RX ORDER — LIDOCAINE HYDROCHLORIDE 20 MG/ML
INJECTION, SOLUTION EPIDURAL; INFILTRATION; INTRACAUDAL; PERINEURAL AS NEEDED
Status: DISCONTINUED | OUTPATIENT
Start: 2025-02-27 | End: 2025-02-27 | Stop reason: SURG

## 2025-02-27 RX ORDER — SODIUM CHLORIDE 0.9 % (FLUSH) 0.9 %
10 SYRINGE (ML) INJECTION EVERY 12 HOURS SCHEDULED
Status: DISCONTINUED | OUTPATIENT
Start: 2025-02-27 | End: 2025-02-27 | Stop reason: HOSPADM

## 2025-02-27 RX ORDER — SODIUM CHLORIDE, SODIUM LACTATE, POTASSIUM CHLORIDE, CALCIUM CHLORIDE 600; 310; 30; 20 MG/100ML; MG/100ML; MG/100ML; MG/100ML
9 INJECTION, SOLUTION INTRAVENOUS CONTINUOUS PRN
Status: DISCONTINUED | OUTPATIENT
Start: 2025-02-27 | End: 2025-02-27 | Stop reason: HOSPADM

## 2025-02-27 RX ORDER — OXYCODONE AND ACETAMINOPHEN 7.5; 325 MG/1; MG/1
1 TABLET ORAL EVERY 6 HOURS PRN
Qty: 15 TABLET | Refills: 0 | Status: SHIPPED | OUTPATIENT
Start: 2025-02-27

## 2025-02-27 RX ORDER — MIDAZOLAM HYDROCHLORIDE 2 MG/2ML
2 INJECTION, SOLUTION INTRAMUSCULAR; INTRAVENOUS ONCE
Status: COMPLETED | OUTPATIENT
Start: 2025-02-27 | End: 2025-02-27

## 2025-02-27 RX ORDER — SODIUM CHLORIDE 0.9 % (FLUSH) 0.9 %
10 SYRINGE (ML) INJECTION AS NEEDED
Status: DISCONTINUED | OUTPATIENT
Start: 2025-02-27 | End: 2025-02-27 | Stop reason: HOSPADM

## 2025-02-27 RX ORDER — SCOPOLAMINE 1 MG/3D
1 PATCH, EXTENDED RELEASE TRANSDERMAL ONCE
Status: DISCONTINUED | OUTPATIENT
Start: 2025-02-27 | End: 2025-02-27 | Stop reason: HOSPADM

## 2025-02-27 RX ORDER — PROPOFOL 10 MG/ML
VIAL (ML) INTRAVENOUS AS NEEDED
Status: DISCONTINUED | OUTPATIENT
Start: 2025-02-27 | End: 2025-02-27 | Stop reason: SURG

## 2025-02-27 RX ORDER — OXYCODONE HYDROCHLORIDE 5 MG/1
5 TABLET ORAL
Status: DISCONTINUED | OUTPATIENT
Start: 2025-02-27 | End: 2025-02-27 | Stop reason: HOSPADM

## 2025-02-27 RX ORDER — DEXMEDETOMIDINE HYDROCHLORIDE 100 UG/ML
INJECTION, SOLUTION INTRAVENOUS AS NEEDED
Status: DISCONTINUED | OUTPATIENT
Start: 2025-02-27 | End: 2025-02-27 | Stop reason: SURG

## 2025-02-27 RX ORDER — ONDANSETRON 2 MG/ML
4 INJECTION INTRAMUSCULAR; INTRAVENOUS ONCE AS NEEDED
Status: DISCONTINUED | OUTPATIENT
Start: 2025-02-27 | End: 2025-02-27 | Stop reason: HOSPADM

## 2025-02-27 RX ORDER — MEPERIDINE HYDROCHLORIDE 25 MG/ML
12.5 INJECTION INTRAMUSCULAR; INTRAVENOUS; SUBCUTANEOUS
Status: DISCONTINUED | OUTPATIENT
Start: 2025-02-27 | End: 2025-02-27 | Stop reason: HOSPADM

## 2025-02-27 RX ORDER — ROCURONIUM BROMIDE 10 MG/ML
INJECTION, SOLUTION INTRAVENOUS AS NEEDED
Status: DISCONTINUED | OUTPATIENT
Start: 2025-02-27 | End: 2025-02-27 | Stop reason: SURG

## 2025-02-27 RX ORDER — ACETAMINOPHEN 500 MG
1000 TABLET ORAL ONCE
Status: COMPLETED | OUTPATIENT
Start: 2025-02-27 | End: 2025-02-27

## 2025-02-27 RX ORDER — FENTANYL CITRATE 50 UG/ML
INJECTION, SOLUTION INTRAMUSCULAR; INTRAVENOUS AS NEEDED
Status: DISCONTINUED | OUTPATIENT
Start: 2025-02-27 | End: 2025-02-27 | Stop reason: SURG

## 2025-02-27 RX ORDER — MAGNESIUM HYDROXIDE 1200 MG/15ML
LIQUID ORAL AS NEEDED
Status: DISCONTINUED | OUTPATIENT
Start: 2025-02-27 | End: 2025-02-27 | Stop reason: HOSPADM

## 2025-02-27 RX ORDER — SODIUM CHLORIDE 9 MG/ML
40 INJECTION, SOLUTION INTRAVENOUS AS NEEDED
Status: DISCONTINUED | OUTPATIENT
Start: 2025-02-27 | End: 2025-02-27 | Stop reason: HOSPADM

## 2025-02-27 RX ORDER — LIDOCAINE HYDROCHLORIDE AND EPINEPHRINE 10; 10 MG/ML; UG/ML
INJECTION, SOLUTION INFILTRATION; PERINEURAL AS NEEDED
Status: DISCONTINUED | OUTPATIENT
Start: 2025-02-27 | End: 2025-02-27 | Stop reason: HOSPADM

## 2025-02-27 RX ORDER — PROMETHAZINE HYDROCHLORIDE 25 MG/1
25 TABLET ORAL ONCE AS NEEDED
Status: DISCONTINUED | OUTPATIENT
Start: 2025-02-27 | End: 2025-02-27 | Stop reason: HOSPADM

## 2025-02-27 RX ORDER — KETOROLAC TROMETHAMINE 30 MG/ML
INJECTION, SOLUTION INTRAMUSCULAR; INTRAVENOUS AS NEEDED
Status: DISCONTINUED | OUTPATIENT
Start: 2025-02-27 | End: 2025-02-27 | Stop reason: SURG

## 2025-02-27 RX ORDER — PROMETHAZINE HYDROCHLORIDE 25 MG/1
25 SUPPOSITORY RECTAL ONCE AS NEEDED
Status: DISCONTINUED | OUTPATIENT
Start: 2025-02-27 | End: 2025-02-27 | Stop reason: HOSPADM

## 2025-02-27 RX ORDER — ONDANSETRON 4 MG/1
4 TABLET, ORALLY DISINTEGRATING ORAL ONCE AS NEEDED
Status: DISCONTINUED | OUTPATIENT
Start: 2025-02-27 | End: 2025-02-27 | Stop reason: HOSPADM

## 2025-02-27 RX ADMIN — SODIUM CHLORIDE 2000 MG: 9 INJECTION, SOLUTION INTRAVENOUS at 08:26

## 2025-02-27 RX ADMIN — LIDOCAINE HYDROCHLORIDE 80 MG: 20 INJECTION, SOLUTION EPIDURAL; INFILTRATION; INTRACAUDAL; PERINEURAL at 08:20

## 2025-02-27 RX ADMIN — PROPOFOL 20 MG: 10 INJECTION, EMULSION INTRAVENOUS at 09:29

## 2025-02-27 RX ADMIN — SODIUM CHLORIDE, SODIUM LACTATE, POTASSIUM CHLORIDE, CALCIUM CHLORIDE: 20; 30; 600; 310 INJECTION, SOLUTION INTRAVENOUS at 09:32

## 2025-02-27 RX ADMIN — MIDAZOLAM HYDROCHLORIDE 2 MG: 1 INJECTION, SOLUTION INTRAMUSCULAR; INTRAVENOUS at 08:08

## 2025-02-27 RX ADMIN — DEXMEDETOMIDINE 10 MCG: 100 INJECTION, SOLUTION, CONCENTRATE INTRAVENOUS at 09:29

## 2025-02-27 RX ADMIN — ROCURONIUM BROMIDE 10 MG: 50 INJECTION INTRAVENOUS at 09:01

## 2025-02-27 RX ADMIN — PROPOFOL 20 MG: 10 INJECTION, EMULSION INTRAVENOUS at 09:31

## 2025-02-27 RX ADMIN — OXYCODONE HYDROCHLORIDE 5 MG: 5 TABLET ORAL at 10:21

## 2025-02-27 RX ADMIN — SODIUM CHLORIDE, SODIUM LACTATE, POTASSIUM CHLORIDE, CALCIUM CHLORIDE 9 ML/HR: 20; 30; 600; 310 INJECTION, SOLUTION INTRAVENOUS at 06:43

## 2025-02-27 RX ADMIN — SCOLOPAMINE TRANSDERMAL SYSTEM 1 PATCH: 1 PATCH, EXTENDED RELEASE TRANSDERMAL at 06:43

## 2025-02-27 RX ADMIN — DEXMEDETOMIDINE 10 MCG: 100 INJECTION, SOLUTION, CONCENTRATE INTRAVENOUS at 08:28

## 2025-02-27 RX ADMIN — SUGAMMADEX 200 MG: 100 INJECTION, SOLUTION INTRAVENOUS at 09:27

## 2025-02-27 RX ADMIN — HYDROMORPHONE HYDROCHLORIDE 0.5 MG: 1 INJECTION, SOLUTION INTRAMUSCULAR; INTRAVENOUS; SUBCUTANEOUS at 09:31

## 2025-02-27 RX ADMIN — ROCURONIUM BROMIDE 50 MG: 50 INJECTION INTRAVENOUS at 08:20

## 2025-02-27 RX ADMIN — KETOROLAC TROMETHAMINE 15 MG: 30 INJECTION, SOLUTION INTRAMUSCULAR; INTRAVENOUS at 08:52

## 2025-02-27 RX ADMIN — ACETAMINOPHEN 1000 MG: 500 TABLET ORAL at 06:43

## 2025-02-27 RX ADMIN — FENTANYL CITRATE 50 MCG: 50 INJECTION, SOLUTION INTRAMUSCULAR; INTRAVENOUS at 08:37

## 2025-02-27 RX ADMIN — DEXMEDETOMIDINE 10 MCG: 100 INJECTION, SOLUTION, CONCENTRATE INTRAVENOUS at 08:44

## 2025-02-27 RX ADMIN — FENTANYL CITRATE 50 MCG: 50 INJECTION, SOLUTION INTRAMUSCULAR; INTRAVENOUS at 08:20

## 2025-02-27 RX ADMIN — PROPOFOL 160 MG: 10 INJECTION, EMULSION INTRAVENOUS at 08:20

## 2025-02-27 RX ADMIN — DEXAMETHASONE SODIUM PHOSPHATE 4 MG: 4 INJECTION, SOLUTION INTRAMUSCULAR; INTRAVENOUS at 08:28

## 2025-02-27 NOTE — DISCHARGE INSTRUCTIONS
DISCHARGE INSTRUCTIONS  HERNIA      For your surgery you had:  General anesthesia (you may have a sore throat for the first 24 hours)  IV sedation.  Local anesthesia  Monitored anesthesia care  You received a medicated patch for nausea prevention today (behind your ear). It is recommended that you remove it 24-48 hours post-operatively. It must be removed within 72 hours.   You received an anesthesia medication today that can cause hormonal forms of birth control to be ineffective. You should use a different form of birth control (to prevent pregnancy) for 7 days.  You may experience dizziness, drowsiness, or light-headedness for several hours following surgery/procedure.  Do not stay alone today or tonight.  Limit your activity for 24 hours.  Resume your diet slowly.  Follow whatever special dietary instructions you may have been given by your doctor.  You should not drive, operate machinery, drink alcohol, or sign legally binding documents for 24 hours or while you are taking pain medication.  NOTIFY YOUR DOCTOR IF YOU EXPERIENCE ANY OF THE FOLLOWING:  Temperature greater than 101 degrees Fahrenheit  Shaking Chills  Redness or excessive drainage from incision  Nausea, vomiting and/or pain that is not controlled by prescribed medications  Increase in bleeding or bleeding that is excessive  Unable to urinate in 6 hours after surgery  If unable to reach your doctor, please go to the closest Emergency Room [x] You may remove dressing: in 48 hours  [x] You may shower in 48 hours (Saturday), no submerging of incisions for 2 weeks.  Do not do any heavy lifting, pushing or pulling.  You may walk up and down stairs.  You may ride in a car but do not drive until instructed by your physician.  Avoid constipation.  Apply an ice pack for 24-48 hours  If unable to urinate in 6 to 8 hours after surgery or urinating frequently in small amounts, notify your doctor or go to the nearest Emergency Room.    Last dose of pain  medication was given at:   Tylenol 1000 mg at 0643 am, Oxy IR at 1021 am, Toradol 30 mg at 0852 am.     SPECIAL INSTRUCTIONS:

## 2025-02-27 NOTE — OP NOTE
VENTRAL / INCISIONAL HERNIA REPAIR LAPAROSCOPIC WITH DAVINCI ROBOT  Procedure Report    Patient Name:  Crystal Mast  YOB: 1985    Date of Surgery:  2/27/2025     Indications: Symptomatic umbilical hernia    Pre-op Diagnosis:   Incisional hernia, without obstruction or gangrene [K43.2]       Post-Op Diagnosis Codes:     * Incisional hernia, without obstruction or gangrene [K43.2]    Procedure/CPT® Codes:  CO RPR AA HERNIA 1ST < 3 CM REDUCIBLE [51600]    Procedure(s):  Robotic converted to open incisional hernia repair at the umbilicus with mesh placement        Staff:  Surgeon(s):  Vaughn Myers MD    Assistant: Loren Horton RN CSA    Anesthesia: General    Estimated Blood Loss: 10 mL    Implants:    Implant Name Type Inv. Item Serial No.  Lot No. LRB No. Used Action   MESH DUY VENTRALEX ST W/STRAP 1.7 - [id number] Implant MESH DUY VENTRALEX ST W/STRAP 1.7  [employer]  ([employer]) [id number] N/A 1 Implanted       Specimen:          None        Findings: Reducible incisional hernia at the umbilicus    Complications: None apparent at the time of surgery    Description of Procedure: Informed consent was obtained before the patient was brought to the operating suite and placed in the supine position.  General endotracheal anesthesia was induced maintained throughout the procedure.  The patient's abdomen was prepped and draped in the standard sterile fashion before a timeout procedure was performed, verifying correct patient, procedure, and other pertinent information.    Local anesthetic was administered in the left upper quadrant at Oscar's point before a small stab incision was made.  Using an 8 mm Optiview trocar the abdomen was entered and the gas applied to achieve adequate pneumoperitoneum at 15 mmHg.  Laparoscope was reinserted and confirmed safe entrance into the abdomen.  Under direct visualization two additional 8 mm trocars were placed along the left hemiabdomen.  The robot  was then successfully docked.    Multiple attempts were made to target the robot and gain adequate visualization and use of the robotic arms towards the incisional hernia defect at the umbilicus, but these were unsuccessful.  Decision was made to convert to an open procedure.  Robot was then successfully undocked.    Local anesthesia was administered periumbilically.  Using a #15 blade scalpel, an infraumbilical curvilinear incision was made.  Sharp dissection was carried down to the cicatrix.  Cicatrix then bluntly isolated circumferentially and retracted away from anterior abdominal wall.  Stalk of the cicatrix and small hernia sac were sharply divided.  All contents were then easily reduced back into the abdomen.  Hernia defect at the umbilicus measured approximately 2 cm and was deemed amenable to a mesh repair.  Ventralex ST mesh was placed intraperitoneally and laid flat.  This was then secured circumferentially to the fascia using interrupted #1 PDS.  Overlying fascia was then closed using 0 PDS in figure-of-eight fashion.  Repair was palpated and felt intact.  Wound was irrigated with warm saline and dried.  Hemostasis was verified.  Umbilicus was tacked to the fascia using a 3-0 Vicryl.  All skin incisions were then closed using subcuticular 4-0 Monocryl before application of Exofin over top to conclude the procedure.    Patient tolerated the procedure well and there were no immediate complications.  All counts were correct x 2 at the end of the procedure.    Disposition: Stable in PACU        The surgical first assist listed above assisted with all needed aspects of the procedure.    Electronically signed by Vaughn Myers MD, 02/27/25, 9:40 AM EST.

## 2025-02-27 NOTE — ANESTHESIA PREPROCEDURE EVALUATION
Anesthesia Evaluation     Patient summary reviewed and Nursing notes reviewed   no history of anesthetic complications:   NPO Solid Status: > 8 hours  NPO Liquid Status: > 2 hours           Airway   Mallampati: II  TM distance: >3 FB  Neck ROM: full  No difficulty expected  Dental    (+) poor dentition        Pulmonary - normal exam    breath sounds clear to auscultation  (+) a smoker Current, asthma,  Cardiovascular - negative cardio ROS and normal exam  Exercise tolerance: good (4-7 METS)    Rhythm: regular  Rate: normal    Hypertension: no meds.      Neuro/Psych  (+) psychiatric history Anxiety and Depression  GI/Hepatic/Renal/Endo - negative ROS     Musculoskeletal     (+) back pain  Abdominal    Substance History      OB/GYN          Other   arthritis,     ROS/Med Hx Other: PAT Nursing Notes unavailable.               Anesthesia Plan    ASA 2     general     (Patient understands anesthesia not responsible for dental damage.)  intravenous induction     Anesthetic plan, risks, benefits, and alternatives have been provided, discussed and informed consent has been obtained with: patient.    Plan discussed with CRNA.    CODE STATUS:

## 2025-02-27 NOTE — H&P
[facility]   GENERAL SURGERY  HISTORY AND PHYSICAL    Patient Name: Crystal Mast  : 1985  MRN: [id number]  Primary Care Physician:  Karin Wong APRN  Date of admission: 2025    Subjective     Chief Complaint:  incisional hernia    HPI:  Crystal Mast is a 39 y.o. female who presents today for evaluation of an incisional hernia at the umbilicus. Patient is previously known to me and underwent an elective cholecystectomy in . She returned to work and a short time ago noticed some pain and a small bulge around her umbilicus when lifting heavy objects. Since that time she has been able to easily manually reduce the bulge, but does note that it causes her daily pain. She denies any nausea, vomiting, fevers, chills, or other symptoms suggestive of obstruction. She continues to smoke. She desires operative repair.     Personal History   Allergies:  No Known Allergies    Home Medications:  Prior to Admission medications    Medication Sig Start Date End Date Taking? Authorizing Provider   celecoxib (CeleBREX) 200 MG capsule Take 1 capsule by mouth Daily. 24  Yes ProviderAlma MD   DULoxetine (CYMBALTA) 30 MG capsule Take 1 capsule by mouth Daily. 24  Yes Karin Wong APRN   gabapentin (NEURONTIN) 300 MG capsule Take 1 capsule by mouth 3 (Three) Times a Day. 8/10/22  Yes ProviderAlma MD   HYDROcodone-acetaminophen (NORCO) 7.5-325 MG per tablet Take 1 tablet by mouth Every 6 (Six) Hours As Needed for Moderate Pain.   Yes ProviderAlma MD   albuterol sulfate  (90 Base) MCG/ACT inhaler Inhale 2 puffs Every 4 (Four) Hours As Needed for Wheezing. 24   Karin Wong APRN       Past Medical History:   Diagnosis Date    Anxiety and depression     Asthma     seasonal    Back pain     H/O Breast infection     H/O Cholelithiasis     H/O Hypertension 2020    Insomnia        Past Surgical History:   Procedure Laterality Date    BILATERAL BREAST  REDUCTION      BREAST BIOPSY      Both sides    BREAST CYST ASPIRATION      BREAST RECONSTRUCTION      WITH LATISSIMUS DORSI MYOCUTANEOUS FLAP    CARPAL TUNNEL RELEASE Right      SECTION      3 OR MORE    CHOLECYSTECTOMY N/A 2024    Procedure: CHOLECYSTECTOMY LAPAROSCOPIC;  Surgeon: Vaughn Myers MD;  Location: [facility] MAIN OR;  Service: General;  Laterality: N/A;    ENDOSCOPY N/A 2024    Procedure: ESOPHAGOGASTRODUODENOSCOPY WITH BIOPSIES;  Surgeon: Vaughn Myers MD;  Location: [facility] ENDOSCOPY;  Service: General;  Laterality: N/A;  GASTRITIS    GANGLION CYST EXCISION      REDUCTION MAMMAPLASTY      TUBAL ABDOMINAL LIGATION         Social History:  reports that she has been smoking cigarettes. She started smoking about 22 years ago. She has a 22.3 pack-year smoking history. She has never used smokeless tobacco. She reports that she does not drink alcohol and does not use drugs.    Family History: family history includes Arthritis in her father and mother; Diabetes in her father; Heart disease in her mother; Hypertension in her father, mother, and another family member; Kidney disease in her father. Otherwise pertinent FHx was reviewed and not pertinent to current issue.    Review of Systems: Review of systems is noncontributory besides as mentioned in above HPI section.   All systems were reviewed and negative except for: none    Objective   Vitals:   Temp:  [98.3 °F (36.8 °C)] 98.3 °F (36.8 °C)  Heart Rate:  [80] 80  Resp:  [16] 16  BP: (125)/(86) 125/86  Physical Exam   Constitutional: healthy appearing, alert, no acute distress, reliable historian  HENT:  NCAT, no visible deformities or lesions  Eyes:  sclerae clear, conjunctivae clear, EOMI  Neck:  normal appearance, no masses, trachea midline  Respiratory:  breathing not labored, respiratory effort appears normal  Cardiovascular:  heart regular rate and rhythm  Abdomen:  soft, nontender, nondistended  Small, easily  "reducible incisional hernia at the umbilicus without overlying skin changes   Skin and subcutaneous tissue:  no visible concerning rashes or lesions, no jaundice  Musculoskeletal: moving all extremities symmetrically and purposefully  Neurologic:  no obvious motor or sensory deficits, cerebellar function without any obvious abnormalities, alert & oriented x 3, speech clear  Psychiatric:  judgment and insight intact, mood normal, affect appropriate, cooperative                      Invalid input(s): \"PROT\"      No results found for: \"LIPASE\"  No results found for: \"INR\"  No results found for: \"LACTATE\"    Radiology:  No orders to display          LABS:  Lab Results (last 48 hours)       ** No results found for the last 48 hours. **            IMAGING:  Imaging Results (Last 48 Hours)       ** No results found for the last 48 hours. **            Result Review:  I have personally reviewed the following checkmarked results from the time of this admission to 2/27/2025 07:22 EST:  [x]  Laboratory  []  Microbiology  []  Radiology  []  EKG/Telemetry   []  Cardiology/Vascular   []  Pathology  []  Old records  []  Other:        Assessment & Plan   Assessment / Plan     Assessment:  Active Hospital Problems:  Active Hospital Problems    Diagnosis     **Incisional hernia, without obstruction or gangrene        Plan:   Incisional hernia, without obstruction or gangrene     39-year-old female with symptomatic incisional hernia at the umbilicus from previous laparoscopic cholecystectomy.  She is increased risk for hernia recurrence given her current BMI as well as smoking status and she voiced understanding to this.  Will plan for outpatient robotic assisted possible open incisional hernia repair with mesh placement and any other indicated procedure.  Risks/benefits/alternatives of the procedure were explained to the patient and she was agreeable to proceed.     Electronically signed by Vaughn Myers MD, 02/27/25, 7:22 AM " EST.

## 2025-02-27 NOTE — ANESTHESIA POSTPROCEDURE EVALUATION
Patient: Crystal Mast    Procedure Summary       Date: 02/27/25 Room / Location: [facility] OR [address] [place] [address] MAIN OR    Anesthesia Start: 0816 Anesthesia Stop: 0942    Procedure: OPEN VENTRAL / INCISIONAL HERNIA REPAIR (Abdomen) Diagnosis:       Incisional hernia, without obstruction or gangrene      (Incisional hernia, without obstruction or gangrene [K43.2])    Surgeons: Vaughn Myers MD Provider: Chung Baeza MD    Anesthesia Type: general ASA Status: 2            Anesthesia Type: general    Vitals  Vitals Value Taken Time   /66 02/27/25 1041   Temp 36.1 °C (97 °F) 02/27/25 1040   Pulse 68 02/27/25 1041   Resp 12 02/27/25 1040   SpO2 89 % 02/27/25 1041   Vitals shown include unfiled device data.        Post Anesthesia Care and Evaluation    Patient location during evaluation: bedside  Patient participation: complete - patient participated  Level of consciousness: awake  Pain management: adequate    Airway patency: patent  PONV Status: none  Cardiovascular status: acceptable and stable  Respiratory status: acceptable  Hydration status: acceptable

## 2025-03-18 ENCOUNTER — TELEPHONE (OUTPATIENT)
Dept: SURGERY | Facility: CLINIC | Age: 40
End: 2025-03-18

## 2025-03-18 NOTE — TELEPHONE ENCOUNTER
PATIENT CALLED AND SAID SHE HAD HERNIA REPAIR ON 02/27/25.  HER P/O  APPOINTMENT IS 03/21/25.    SHE IS SORE AND TENDER WHERE HER HERNIA WAS.  THERE IS A LITTLE SWELLING AND IT IS HARD.  IT IS RED AND WARM.  SHE CAN BARELY TOUCH IT FOR THE SORENESS.  SHE COULDN'T ROLL OUT OF BED THIS MORNING.  THE INCISION IS JUST A LITTLE WARM.  SHE IS A LOT SORER THAN LAST WEEK.    SHE DOESN'T KNOW IF SHE HAS DONE SOMETHING, OR IF THE MESH DIDN'T HOLD.    SHE DOESN'T WANT TO GO TO THE ER, IF IT IS SOMETHING THAT CAN WAIT UNTIL HER P/O APPOINTMENT.    #[phone]

## 2025-03-19 ENCOUNTER — OFFICE VISIT (OUTPATIENT)
Dept: SURGERY | Facility: CLINIC | Age: 40
End: 2025-03-19

## 2025-03-19 VITALS — BODY MASS INDEX: 29.26 KG/M2 | HEIGHT: 65 IN | WEIGHT: 175.6 LBS

## 2025-03-19 DIAGNOSIS — Z98.890 POST-OPERATIVE STATE: Primary | ICD-10-CM

## 2025-03-19 RX ORDER — SULFAMETHOXAZOLE AND TRIMETHOPRIM 800; 160 MG/1; MG/1
1 TABLET ORAL 2 TIMES DAILY
Qty: 14 TABLET | Refills: 0 | Status: SHIPPED | OUTPATIENT
Start: 2025-03-19

## 2025-03-19 NOTE — PROGRESS NOTES
"Chief Complaint  Post-op (Ventral/Incisional hernia repair. PATIENT HAS REDNESS AND SWELLING AT INCISION SITE.)    Subjective    Subjective     Crystal Mast is a 39 y.o. female who presents to [facility] GENER[facility] SURGERY    History of Present Illness  39-year-old female who presents today for routine postoperative follow-up after undergoing open incisional hernia repair with mesh placement last month.  Postoperatively she was doing well until Sunday she states she felt a pulling sensation around her umbilicus.  She has had some increased redness around her incision in the past few days though it is gotten slightly better today.  She denies any nausea, vomiting, fevers, chills.  She is otherwise tolerating a regular diet and having normal bowel movements.  Post-op      Personal History     Social History     Tobacco Use    Smoking status: Every Day     Current packs/day: 1.00     Average packs/day: 1 pack/day for 22.4 years (22.4 ttl pk-yrs)     Types: Cigarettes     Start date: 10/25/2002    Smokeless tobacco: Never    Tobacco comments:     LAST SMOKED 2/27 AT 0530   Vaping Use    Vaping status: Never Used   Substance Use Topics    Alcohol use: Never    Drug use: Never     No Known Allergies    Objective    Objective   Vital Signs:   Ht 165.1 cm (65\")   Wt 79.7 kg (175 lb 9.6 oz)   BMI 29.22 kg/m²       Physical Exam  Constitutional:       Appearance: Normal appearance.   HENT:      Head: Normocephalic and atraumatic.      Mouth/Throat:      Mouth: Mucous membranes are moist.      Pharynx: Oropharynx is clear.   Cardiovascular:      Rate and Rhythm: Normal rate and regular rhythm.   Pulmonary:      Effort: Pulmonary effort is normal. No respiratory distress.   Abdominal:      General: There is no distension.      Palpations: Abdomen is soft.      Tenderness: There is no abdominal tenderness.      Comments: Minimal erythema periumbilically, no palpable hernia, no expressible drainage or palpable " fluid collection   Musculoskeletal:         General: No swelling. Normal range of motion.      Cervical back: Normal range of motion and neck supple.   Skin:     General: Skin is warm and dry.   Neurological:      General: No focal deficit present.      Mental Status: She is alert and oriented to person, place, and time.   Psychiatric:         Mood and Affect: Mood normal.         Behavior: Behavior normal.                  Assessment / Plan      Diagnoses and all orders for this visit:    1. Post-operative state (Primary)  -     sulfamethoxazole-trimethoprim (Bactrim DS) 800-160 MG per tablet; Take 1 tablet by mouth 2 (Two) Times a Day.  Dispense: 14 tablet; Refill: 0  -     CT Abdomen Pelvis With Contrast; Future    39-year-old female status post open repair of incisional hernia at the umbilicus with mesh placement.  Patient feels like she ripped something after her repair just a few days ago and has some mild swelling and erythema in the area.  Will prescribe short course of oral antibiotics and further evaluate with CT abdomen pelvis.  Will follow-up results of imaging and call patient once this is obtained.    Follow Up   Return for will f/u CT and call patient with results.      Patient was given instructions and counseling regarding her condition or for health maintenance advice. Please see specific information pulled into the AVS if appropriate.     Electronically signed by Vaughn Myers MD, 03/19/25, 3:13 PM EDT.

## 2025-03-20 ENCOUNTER — TELEPHONE (OUTPATIENT)
Dept: SURGERY | Facility: CLINIC | Age: 40
End: 2025-03-20

## 2025-03-20 NOTE — TELEPHONE ENCOUNTER
PT WAS SEEN YESTERDAY AND A CT WAS ORDERED. THE SOONEST THEY COULD SCHEDULE HER WAS 3/27. SHE SAID THAT SHE IS IN PAIN AND IT IS WORSE TODAY THAN YESTERDAY. SHE WANTS TO KNOW IF WE CAN GET IT DONE SOONER.

## 2025-03-20 NOTE — TELEPHONE ENCOUNTER
Spoke with the patient and moved CT up to 3/24/25. Also gave patient phone number for CT scheduling to patient so that she can keep calling to see if anything sooner opens up. Patient was advised in the mean time that if her pain worsens, she will need to go to the ED. Patient v/u.

## 2025-03-24 ENCOUNTER — HOSPITAL ENCOUNTER (OUTPATIENT)
Dept: CT IMAGING | Facility: HOSPITAL | Age: 40
Discharge: HOME | End: 2025-03-24
Admitting: STUDENT IN AN ORGANIZED HEALTH CARE EDUCATION/TRAINING PROGRAM
Payer: COMMERCIAL

## 2025-03-24 DIAGNOSIS — Z98.890 POST-OPERATIVE STATE: ICD-10-CM

## 2025-03-24 PROCEDURE — 25510000001 IOPAMIDOL PER 1 ML: Performed by: STUDENT IN AN ORGANIZED HEALTH CARE EDUCATION/TRAINING PROGRAM

## 2025-03-24 PROCEDURE — 74177 CT ABD & PELVIS W/CONTRAST: CPT

## 2025-03-24 RX ORDER — IOPAMIDOL 755 MG/ML
100 INJECTION, SOLUTION INTRAVASCULAR
Status: COMPLETED | OUTPATIENT
Start: 2025-03-24 | End: 2025-03-24

## 2025-03-24 RX ADMIN — IOPAMIDOL 100 ML: 755 INJECTION, SOLUTION INTRAVENOUS at 14:14

## 2025-03-26 ENCOUNTER — RESULTS FOLLOW-UP (OUTPATIENT)
Dept: CT IMAGING | Facility: HOSPITAL | Age: 40
End: 2025-03-26

## 2025-03-26 NOTE — TELEPHONE ENCOUNTER
Attempted to call patient to schedule follow up appointment with Dr. Myers for next week. Left message for patient to call back to schedule. HUB ok to schedule appt for next week.     ----- Message from Vaughn Myers sent at 3/26/2025  3:51 PM EDT -----  Patient is doing well. Will you have her follow up with me in the office next week?  ----- Message -----  From: Interface, Rad Results [place] In  Sent: 3/24/2025   2:30 PM EDT  To: Vaughn Myers MD

## 2025-04-02 ENCOUNTER — OFFICE VISIT (OUTPATIENT)
Dept: SURGERY | Facility: CLINIC | Age: 40
End: 2025-04-02
Payer: COMMERCIAL

## 2025-04-02 ENCOUNTER — OFFICE VISIT (OUTPATIENT)
Dept: FAMILY MEDICINE CLINIC | Facility: CLINIC | Age: 40
End: 2025-04-02
Payer: COMMERCIAL

## 2025-04-02 VITALS
WEIGHT: 177 LBS | SYSTOLIC BLOOD PRESSURE: 138 MMHG | OXYGEN SATURATION: 97 % | BODY MASS INDEX: 29.49 KG/M2 | TEMPERATURE: 97.4 F | DIASTOLIC BLOOD PRESSURE: 88 MMHG | HEART RATE: 97 BPM | HEIGHT: 65 IN

## 2025-04-02 VITALS — WEIGHT: 176.6 LBS | HEIGHT: 65 IN | BODY MASS INDEX: 29.42 KG/M2

## 2025-04-02 DIAGNOSIS — N28.1 RENAL CYST: ICD-10-CM

## 2025-04-02 DIAGNOSIS — E27.8 ADRENAL INCIDENTALOMA: ICD-10-CM

## 2025-04-02 DIAGNOSIS — K76.0 FATTY LIVER: ICD-10-CM

## 2025-04-02 DIAGNOSIS — R91.1 PULMONARY NODULE: Primary | ICD-10-CM

## 2025-04-02 DIAGNOSIS — Z98.890 POST-OPERATIVE STATE: Primary | ICD-10-CM

## 2025-04-02 DIAGNOSIS — R21 RASH: ICD-10-CM

## 2025-04-02 PROCEDURE — 3079F DIAST BP 80-89 MM HG: CPT | Performed by: STUDENT IN AN ORGANIZED HEALTH CARE EDUCATION/TRAINING PROGRAM

## 2025-04-02 PROCEDURE — 3075F SYST BP GE 130 - 139MM HG: CPT | Performed by: STUDENT IN AN ORGANIZED HEALTH CARE EDUCATION/TRAINING PROGRAM

## 2025-04-02 PROCEDURE — 1160F RVW MEDS BY RX/DR IN RCRD: CPT | Performed by: STUDENT IN AN ORGANIZED HEALTH CARE EDUCATION/TRAINING PROGRAM

## 2025-04-02 PROCEDURE — 1159F MED LIST DOCD IN RCRD: CPT | Performed by: STUDENT IN AN ORGANIZED HEALTH CARE EDUCATION/TRAINING PROGRAM

## 2025-04-02 PROCEDURE — 99214 OFFICE O/P EST MOD 30 MIN: CPT | Performed by: STUDENT IN AN ORGANIZED HEALTH CARE EDUCATION/TRAINING PROGRAM

## 2025-04-02 PROCEDURE — 1125F AMNT PAIN NOTED PAIN PRSNT: CPT | Performed by: STUDENT IN AN ORGANIZED HEALTH CARE EDUCATION/TRAINING PROGRAM

## 2025-04-02 RX ORDER — KETOCONAZOLE 20 MG/G
1 CREAM TOPICAL DAILY
Qty: 30 G | Refills: 0 | Status: SHIPPED | OUTPATIENT
Start: 2025-04-02

## 2025-04-02 RX ORDER — NYSTATIN AND TRIAMCINOLONE ACETONIDE 100000; 1 [USP'U]/G; MG/G
1 OINTMENT TOPICAL 2 TIMES DAILY
Qty: 30 G | Refills: 0 | Status: SHIPPED | OUTPATIENT
Start: 2025-04-02

## 2025-04-02 RX ORDER — SULFAMETHOXAZOLE AND TRIMETHOPRIM 800; 160 MG/1; MG/1
1 TABLET ORAL 2 TIMES DAILY
Qty: 14 TABLET | Refills: 0 | Status: SHIPPED | OUTPATIENT
Start: 2025-04-02

## 2025-04-02 RX ORDER — HYDROCODONE BITARTRATE AND ACETAMINOPHEN 7.5; 325 MG/1; MG/1
1 TABLET ORAL
COMMUNITY
Start: 2025-03-19

## 2025-04-02 NOTE — PROGRESS NOTES
"Chief Complaint  Abnormal Imaging (Discuss abnormal CT Scan of abdomen with incidental findings)    Subjective      Crystal Mast is a 40 y.o. female who presents to [facility]       History of Present Illness  The patient is a 40-year-old female who presents to follow up on abnormal imaging.    She underwent hernia repair surgery on 03/07/2025, which was complicated by an infection a few weeks postoperatively. A subsequent CT scan revealed a pulmonary nodule, prompting her to seek further evaluation. She has an appointment with her surgeon today at 2:00 PM. She is a current smoker, consuming approximately one pack per day.    She reports a rash located under her abdomen, on her mons pubis, which is generally asymptomatic but becomes pruritic upon exposure to hot water during showers or baths.     She also experiences back pain upon rising from bed in the morning.    SOCIAL HISTORY  She smokes a pack a day.        Objective   Vital Signs:   Vitals:    04/02/25 0813   BP: 138/88   Pulse: 97   Temp: 97.4 °F (36.3 °C)   SpO2: 97%   Weight: 80.3 kg (177 lb)   Height: 165.1 cm (65\")     Body mass index is 29.45 kg/m².    Wt Readings from Last 3 Encounters:   04/02/25 80.3 kg (177 lb)   03/19/25 79.7 kg (175 lb 9.6 oz)   02/27/25 80.2 kg (176 lb 12.9 oz)     BP Readings from Last 3 Encounters:   04/02/25 138/88   02/27/25 122/83   01/09/25 120/80       Health Maintenance   Topic Date Due    ANNUAL PHYSICAL  Never done    COVID-19 Vaccine (1 - 2024-25 season) 12/10/2025 (Originally 9/1/2024)    Pneumococcal Vaccine 0-49 (1 of 2 - PCV) 12/12/2025 (Originally 3/25/2005)    INFLUENZA VACCINE  07/01/2025    TDAP/TD VACCINES (2 - Td or Tdap) 09/01/2025    PAP SMEAR  10/25/2026    HEPATITIS C SCREENING  Completed       Physical Exam  HENT:      Head: Normocephalic and atraumatic.      Right Ear: External ear normal.      Left Ear: External ear normal.   Eyes:      Conjunctiva/sclera: " Conjunctivae normal.   Cardiovascular:      Rate and Rhythm: Normal rate and regular rhythm.      Heart sounds: Normal heart sounds.   Pulmonary:      Effort: Pulmonary effort is normal.      Breath sounds: Normal breath sounds.   Skin:     Findings: Rash present.      Comments: There is ring like rash encircling the mons pubis, it is erythematous and scaly but there are some smaller surrounding lesions that appear similar to Candida   Neurological:      General: No focal deficit present.      Mental Status: She is alert.          Result Review :  The following data was reviewed by: Rupa Pizano DO on 04/02/2025:         Procedures          ASSESSMENT/PLAN  Diagnoses and all orders for this visit:    1. Pulmonary nodule (Primary)  -     Cancel: CT Chest Without Contrast; Future  -     CT Chest Without Contrast; Future    2. Rash  -     nystatin-triamcinolone (MYCOLOG) 134210-4.1 UNIT/GM-% ointment; Apply 1 Application topically to the appropriate area as directed 2 (Two) Times a Day.  Dispense: 30 g; Refill: 0  -     ketoconazole (NIZORAL) 2 % cream; Apply 1 Application topically to the appropriate area as directed Daily.  Dispense: 30 g; Refill: 0    3. Fatty liver    4. Renal cyst    5. Adrenal incidentaloma          Assessment & Plan  1. Pulmonary nodules.  The nodules are likely secondary to an inflammatory or infectious process, potentially related to recent intubation for hernia repair surgery. A dedicated CT scan of the chest will be ordered to reassess the pulmonary nodules.    2. Abdominal rash.  The rash appears to be of fungal origin, characterized by raised and slightly flaky skin. Prescriptions for nystatin ointment and an antifungal ointment have been provided. She is advised to apply these ointments alternately four times daily, twice daily for each.    3. Fatty liver (hepatic steatosis).  The condition is not severe and can potentially be reversed or improved with dietary modifications. No  immediate intervention is required.    4. Right renal cyst.  The cyst measures 3 cm. No immediate intervention is required unless symptoms such as blood in urine develop.    5. Adrenal gland nodule.  The nodule is not currently causing symptoms such as headaches, hypertension, sweating, or shaking, indicating it is likely not a pheo or otherwise active adenoma. No immediate intervention is required unless symptoms develop.    PROCEDURE  The patient underwent hernia repair surgery on 03/07/2025.            FOLLOW UP  Return if symptoms worsen or fail to improve.  Patient was given instructions and counseling regarding her condition or for health maintenance advice. Please see specific information pulled into the AVS if appropriate.       Rupa Pizano DO  04/02/25  09:36 EDT    Patient or patient representative verbalized consent for the use of Ambient Listening during the visit with  Rupa Pizano DO for chart documentation. 4/2/2025  09:35 EDT

## 2025-04-02 NOTE — PROGRESS NOTES
"Chief Complaint  Post-op Follow-up (INCISIONAL HERNIA REPAIR.)    Subjective    Subjective     Crystal Mast is a 40 y.o. female who presents to [facility] GENERAL SURGERY    History of Present Illness  40-year-old female who presents today for scheduled follow-up after undergoing umbilical hernia repair.  Since her last visit she underwent a CT abdomen pelvis without any significant findings periumbilically.  She completed a course of oral antibiotics and overall is feeling much better.  She denies any nausea, vomiting, fevers, chills.  She still has some mild pain around her umbilicus.  She is otherwise tolerating a regular diet and having normal bowel movements.  Post-op Follow-up      Personal History     Social History     Tobacco Use    Smoking status: Every Day     Current packs/day: 1.00     Average packs/day: 1 pack/day for 22.4 years (22.4 ttl pk-yrs)     Types: Cigarettes     Start date: 10/25/2002     Passive exposure: Current    Smokeless tobacco: Never    Tobacco comments:     LAST SMOKED 2/27 AT 0530   Vaping Use    Vaping status: Never Used   Substance Use Topics    Alcohol use: Never    Drug use: Never     No Known Allergies    Objective    Objective   Vital Signs:   Ht 165.1 cm (65\")   Wt 80.1 kg (176 lb 9.6 oz)   BMI 29.39 kg/m²       Physical Exam  Constitutional:       Appearance: Normal appearance.   HENT:      Head: Normocephalic and atraumatic.      Mouth/Throat:      Mouth: Mucous membranes are moist.      Pharynx: Oropharynx is clear.   Cardiovascular:      Rate and Rhythm: Normal rate and regular rhythm.   Pulmonary:      Effort: Pulmonary effort is normal. No respiratory distress.   Abdominal:      General: There is no distension.      Palpations: Abdomen is soft.      Tenderness: There is no abdominal tenderness.      Comments: Minimal incisional erythema along the right side, no palpable hernia   Musculoskeletal:         General: No swelling. Normal range of motion. "      Cervical back: Normal range of motion and neck supple.   Skin:     General: Skin is warm and dry.   Neurological:      General: No focal deficit present.      Mental Status: She is alert and oriented to person, place, and time.   Psychiatric:         Mood and Affect: Mood normal.         Behavior: Behavior normal.                  Assessment / Plan      Diagnoses and all orders for this visit:    1. Post-operative state (Primary)    Other orders  -     sulfamethoxazole-trimethoprim (Bactrim DS) 800-160 MG per tablet; Take 1 tablet by mouth 2 (Two) Times a Day.  Dispense: 14 tablet; Refill: 0    40-year-old female status post open umbilical hernia repair.  Recent CT abdomen pelvis reviewed.  No clinical concern for mesh infection at this time.  Plan for follow-up with me in 2 weeks for wound check.    Follow Up   Return in about 2 weeks (around 4/16/2025).      Patient was given instructions and counseling regarding her condition or for health maintenance advice. Please see specific information pulled into the AVS if appropriate.     Electronically signed by Vaughn Myers MD, 04/02/25, 2:50 PM EDT.

## 2025-04-07 ENCOUNTER — HOSPITAL ENCOUNTER (OUTPATIENT)
Dept: CT IMAGING | Facility: HOSPITAL | Age: 40
Discharge: HOME | End: 2025-04-07
Admitting: STUDENT IN AN ORGANIZED HEALTH CARE EDUCATION/TRAINING PROGRAM
Payer: COMMERCIAL

## 2025-04-07 ENCOUNTER — TRANSCRIBE ORDERS (OUTPATIENT)
Dept: ADMINISTRATIVE | Facility: HOSPITAL | Age: 40
End: 2025-04-07
Payer: COMMERCIAL

## 2025-04-07 DIAGNOSIS — R91.1 PULMONARY NODULE: ICD-10-CM

## 2025-04-07 PROCEDURE — 71250 CT THORAX DX C-: CPT

## 2025-04-16 ENCOUNTER — TELEPHONE (OUTPATIENT)
Dept: SURGERY | Facility: CLINIC | Age: 40
End: 2025-04-16

## 2025-04-16 NOTE — TELEPHONE ENCOUNTER
Caller: AALIYAH IRWIN     Relationship:  SELF    Best call back number: [phone]    PATIENT CALLED REQUESTING TO CANCEL SAME DAY APPT.    Did the patient call AFTER the start time of their scheduled appointment?  []YES  [x]NO    Was the patient's appointment rescheduled? [x]YES  []NO    Any additional information: PT AILEEN TO 4-21-25

## 2025-04-21 ENCOUNTER — OFFICE VISIT (OUTPATIENT)
Dept: SURGERY | Facility: CLINIC | Age: 40
End: 2025-04-21
Payer: COMMERCIAL

## 2025-04-21 VITALS — WEIGHT: 177.3 LBS | BODY MASS INDEX: 29.54 KG/M2 | HEIGHT: 65 IN

## 2025-04-21 DIAGNOSIS — Z51.89 VISIT FOR WOUND CHECK: Primary | ICD-10-CM

## 2025-04-21 PROCEDURE — 99213 OFFICE O/P EST LOW 20 MIN: CPT | Performed by: STUDENT IN AN ORGANIZED HEALTH CARE EDUCATION/TRAINING PROGRAM

## 2025-04-21 PROCEDURE — 1160F RVW MEDS BY RX/DR IN RCRD: CPT | Performed by: STUDENT IN AN ORGANIZED HEALTH CARE EDUCATION/TRAINING PROGRAM

## 2025-04-21 PROCEDURE — 1159F MED LIST DOCD IN RCRD: CPT | Performed by: STUDENT IN AN ORGANIZED HEALTH CARE EDUCATION/TRAINING PROGRAM

## 2025-04-21 NOTE — PROGRESS NOTES
"Chief Complaint  Follow-up    Subjective    Subjective     Crystal Mast is a 40 y.o. female who presents to [facility] GENER[facility] SURGERY    History of Present Illness  40-year-old female who presents today for wound check after undergoing open umbilical hernia repair with mesh placement.  She completed a course of oral antibiotics since last seen in the office.  She denies any nausea, vomiting, fevers, chills.  She no longer has any pain around her umbilicus.  She is otherwise tolerating a regular diet and having normal bowel movements.      Personal History     Social History     Tobacco Use    Smoking status: Every Day     Current packs/day: 1.00     Average packs/day: 1 pack/day for 22.5 years (22.5 ttl pk-yrs)     Types: Cigarettes     Start date: 10/25/2002     Passive exposure: Current    Smokeless tobacco: Never    Tobacco comments:     LAST SMOKED 2/27 AT 0530   Vaping Use    Vaping status: Never Used   Substance Use Topics    Alcohol use: Never    Drug use: Never     No Known Allergies    Objective    Objective   Vital Signs:   Ht 165.1 cm (65\")   Wt 80.4 kg (177 lb 4.8 oz)   BMI 29.50 kg/m²       Physical Exam  Constitutional:       Appearance: Normal appearance.   HENT:      Head: Normocephalic and atraumatic.      Mouth/Throat:      Mouth: Mucous membranes are moist.      Pharynx: Oropharynx is clear.   Cardiovascular:      Rate and Rhythm: Normal rate and regular rhythm.   Pulmonary:      Effort: Pulmonary effort is normal. No respiratory distress.   Abdominal:      General: There is no distension.      Palpations: Abdomen is soft.      Tenderness: There is no abdominal tenderness.      Comments: Periumbilical incision well-healed   Musculoskeletal:         General: No swelling. Normal range of motion.      Cervical back: Normal range of motion and neck supple.   Skin:     General: Skin is warm and dry.   Neurological:      General: No focal deficit present.      Mental Status: She " is alert and oriented to person, place, and time.   Psychiatric:         Mood and Affect: Mood normal.         Behavior: Behavior normal.                  Assessment / Plan      Diagnoses and all orders for this visit:    1. Visit for wound check (Primary)    40-year-old female status post open umbilical hernia repair.  Doing well postoperatively with no issues at this time.  She may otherwise follow-up with me again in clinic as needed.    Follow Up   Return if symptoms worsen or fail to improve.      Patient was given instructions and counseling regarding her condition or for health maintenance advice. Please see specific information pulled into the AVS if appropriate.     Electronically signed by Vaughn Myers MD, 04/21/25, 9:53 AM EDT.

## 2025-06-03 ENCOUNTER — TELEPHONE (OUTPATIENT)
Dept: SURGERY | Facility: CLINIC | Age: 40
End: 2025-06-03
Payer: COMMERCIAL

## 2025-06-03 NOTE — TELEPHONE ENCOUNTER
Attempted to speak to the patient to schedule an appointment with Dr. Myers for tomorrow, 6/4/25. Left message requesting a call back. Relayed Dr. Myers's message regarding chills and/or fever as well . Patient to report to the ER in the mean time if she develops chills or fever. HUB ok to relay to patient and put her on the schedule for Dr. Myers tomorrow afternoon.

## 2025-06-03 NOTE — TELEPHONE ENCOUNTER
PATIENT CALLED AND SAID WHERE HER MESH FROM HER HERNIA IS LOCATED, IT IS HARD, SWOLLEN, RED, AND WARM.    SHE WANTS TO SCHEDULE AN APPOINTMENT WITH DR. CLEMENS.    #[phone]

## 2025-06-04 ENCOUNTER — OFFICE VISIT (OUTPATIENT)
Dept: SURGERY | Facility: CLINIC | Age: 40
End: 2025-06-04
Payer: COMMERCIAL

## 2025-06-04 VITALS
HEART RATE: 44 BPM | BODY MASS INDEX: 28.69 KG/M2 | SYSTOLIC BLOOD PRESSURE: 119 MMHG | WEIGHT: 172.2 LBS | HEIGHT: 65 IN | DIASTOLIC BLOOD PRESSURE: 68 MMHG

## 2025-06-04 DIAGNOSIS — R10.33 PERIUMBILICAL ABDOMINAL PAIN: Primary | ICD-10-CM

## 2025-06-04 NOTE — PROGRESS NOTES
"Chief Complaint  Wound Check (REDNESS AROUND BELLY BUTTON, HARD KNOT AND PAIN WITH BM.)    Subjective    Subjective     Crystal Mast is a 40 y.o. female who presents to [facility] GENERAL SURGERY    History of Present Illness  40-year-old female who presents today for evaluation of periumbilical abdominal pain.  She previously underwent an open incisional hernia repair at the umbilicus with mesh placement in February.  Since last evaluated in clinic she has developed some increased periumbilical abdominal pain particularly when straining to have a bowel movement.  She has some erythema in the area that she says is worse throughout the day but tends to resolve when resting.  She denies any nausea, vomiting, fevers, chills.  Wound Check        Personal History     Social History     Tobacco Use    Smoking status: Every Day     Current packs/day: 1.00     Average packs/day: 1 pack/day for 22.6 years (22.6 ttl pk-yrs)     Types: Cigarettes     Start date: 10/25/2002     Passive exposure: Current    Smokeless tobacco: Never    Tobacco comments:     LAST SMOKED 2/27 AT 0530   Vaping Use    Vaping status: Never Used   Substance Use Topics    Alcohol use: Never    Drug use: Never     No Known Allergies    Objective    Objective   Vital Signs:   /68 (BP Location: Left arm, Patient Position: Sitting, Cuff Size: Adult)   Pulse (!) 44   Ht 165.1 cm (65\")   Wt 78.1 kg (172 lb 3.2 oz)   BMI 28.66 kg/m²       Physical Exam  Constitutional:       Appearance: Normal appearance.   HENT:      Head: Normocephalic and atraumatic.      Mouth/Throat:      Mouth: Mucous membranes are moist.      Pharynx: Oropharynx is clear.   Cardiovascular:      Rate and Rhythm: Normal rate and regular rhythm.   Pulmonary:      Effort: Pulmonary effort is normal. No respiratory distress.   Abdominal:      General: There is no distension.      Palpations: Abdomen is soft.      Comments: Small area of erythema above the " umbilicus, no palpable hernia but the area is somewhat tender to palpation   Musculoskeletal:         General: No swelling. Normal range of motion.      Cervical back: Normal range of motion and neck supple.   Skin:     General: Skin is warm and dry.   Neurological:      General: No focal deficit present.      Mental Status: She is alert and oriented to person, place, and time.   Psychiatric:         Mood and Affect: Mood normal.         Behavior: Behavior normal.                  Assessment / Plan      Diagnoses and all orders for this visit:    1. Periumbilical abdominal pain (Primary)  -     CT Abdomen Pelvis Without Contrast; Future    40-year-old female status post open incisional hernia repair of the umbilicus with mesh placement approximately 3 months ago.  Developed some erythema in the area.  Recommend further evaluation with CT abdomen pelvis to assess for possible mesh infection.  Will follow-up results of CT and call patient with results.    Follow Up   Return for will call with CT results.      Patient was given instructions and counseling regarding her condition or for health maintenance advice. Please see specific information pulled into the AVS if appropriate.     Electronically signed by Vaughn Myers MD, 06/04/25, 3:00 PM EDT.

## 2025-06-06 ENCOUNTER — HOSPITAL ENCOUNTER (OUTPATIENT)
Dept: CT IMAGING | Facility: HOSPITAL | Age: 40
Discharge: HOME | End: 2025-06-06
Payer: COMMERCIAL

## 2025-06-06 ENCOUNTER — RESULTS FOLLOW-UP (OUTPATIENT)
Dept: SURGERY | Facility: CLINIC | Age: 40
End: 2025-06-06
Payer: COMMERCIAL

## 2025-06-06 DIAGNOSIS — R10.33 PERIUMBILICAL ABDOMINAL PAIN: ICD-10-CM

## 2025-06-06 PROCEDURE — 74176 CT ABD & PELVIS W/O CONTRAST: CPT

## 2025-06-06 NOTE — TELEPHONE ENCOUNTER
----- Message from Vaughn Myers sent at 6/6/2025  2:57 PM EDT -----  Have her come in on Monday to discuss mesh removal please

## 2025-06-09 ENCOUNTER — OFFICE VISIT (OUTPATIENT)
Dept: SURGERY | Facility: CLINIC | Age: 40
End: 2025-06-09
Payer: COMMERCIAL

## 2025-06-09 VITALS
HEART RATE: 66 BPM | BODY MASS INDEX: 28.44 KG/M2 | HEIGHT: 65 IN | WEIGHT: 170.7 LBS | SYSTOLIC BLOOD PRESSURE: 146 MMHG | DIASTOLIC BLOOD PRESSURE: 83 MMHG

## 2025-06-09 DIAGNOSIS — T85.79XA INFECTED HERNIOPLASTY MESH, INITIAL ENCOUNTER: Primary | ICD-10-CM

## 2025-06-09 PROCEDURE — 99213 OFFICE O/P EST LOW 20 MIN: CPT | Performed by: STUDENT IN AN ORGANIZED HEALTH CARE EDUCATION/TRAINING PROGRAM

## 2025-06-09 PROCEDURE — 1159F MED LIST DOCD IN RCRD: CPT | Performed by: STUDENT IN AN ORGANIZED HEALTH CARE EDUCATION/TRAINING PROGRAM

## 2025-06-09 PROCEDURE — 3079F DIAST BP 80-89 MM HG: CPT | Performed by: STUDENT IN AN ORGANIZED HEALTH CARE EDUCATION/TRAINING PROGRAM

## 2025-06-09 PROCEDURE — 3077F SYST BP >= 140 MM HG: CPT | Performed by: STUDENT IN AN ORGANIZED HEALTH CARE EDUCATION/TRAINING PROGRAM

## 2025-06-09 PROCEDURE — 1160F RVW MEDS BY RX/DR IN RCRD: CPT | Performed by: STUDENT IN AN ORGANIZED HEALTH CARE EDUCATION/TRAINING PROGRAM

## 2025-06-09 RX ORDER — SODIUM CHLORIDE 0.9 % (FLUSH) 0.9 %
10 SYRINGE (ML) INJECTION AS NEEDED
Status: CANCELLED | OUTPATIENT
Start: 2025-06-09

## 2025-06-09 RX ORDER — SODIUM CHLORIDE 9 MG/ML
40 INJECTION, SOLUTION INTRAVENOUS AS NEEDED
Status: CANCELLED | OUTPATIENT
Start: 2025-06-09

## 2025-06-09 RX ORDER — ONDANSETRON 2 MG/ML
4 INJECTION INTRAMUSCULAR; INTRAVENOUS EVERY 6 HOURS PRN
Status: CANCELLED | OUTPATIENT
Start: 2025-06-09

## 2025-06-09 RX ORDER — SODIUM CHLORIDE 0.9 % (FLUSH) 0.9 %
10 SYRINGE (ML) INJECTION EVERY 12 HOURS SCHEDULED
Status: CANCELLED | OUTPATIENT
Start: 2025-06-09

## 2025-06-09 NOTE — PROGRESS NOTES
"Chief Complaint  Follow-up (CT SCAN)    Subjective    Subjective     Crystal Mast is a 40 y.o. female who presents to [facility] GENERAL SURGERY    History of Present Illness  40-year-old female who presents today for follow-up after obtaining CT abdomen pelvis last week.  She underwent open umbilical hernia repair with permanent mesh earlier this year.  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  She denies any other issues at this time.      Personal History     Social History     Tobacco Use    Smoking status: Every Day     Current packs/day: 1.00     Average packs/day: 1 pack/day for 22.6 years (22.6 ttl pk-yrs)     Types: Cigarettes     Start date: 10/25/2002     Passive exposure: Current    Smokeless tobacco: Never    Tobacco comments:     LAST SMOKED 2/27 AT 0530   Vaping Use    Vaping status: Never Used   Substance Use Topics    Alcohol use: Never    Drug use: Never     No Known Allergies    Objective    Objective   Vital Signs:   /83 (BP Location: Left arm, Patient Position: Sitting, Cuff Size: Adult)   Pulse 66   Ht 165.1 cm (65\")   Wt 77.4 kg (170 lb 11.2 oz)   BMI 28.41 kg/m²       Physical Exam  Constitutional:       Appearance: Normal appearance.   HENT:      Head: Normocephalic and atraumatic.      Mouth/Throat:      Mouth: Mucous membranes are moist.      Pharynx: Oropharynx is clear.   Cardiovascular:      Rate and Rhythm: Normal rate and regular rhythm.   Pulmonary:      Effort: Pulmonary effort is normal. No respiratory distress.   Abdominal:      General: There is no distension.      Palpations: Abdomen is soft.      Tenderness: There is no abdominal tenderness.      Comments: No palpable umbilical hernia, small area of erythema above the umbilicus   Musculoskeletal:         General: No swelling. Normal range of motion.      Cervical back: Normal range of motion and neck supple.   Skin:     General: Skin is " warm and dry.   Neurological:      General: No focal deficit present.      Mental Status: She is alert and oriented to person, place, and time.   Psychiatric:         Mood and Affect: Mood normal.         Behavior: Behavior normal.                  Assessment / Plan      Diagnoses and all orders for this visit:    1. Infected hernioplasty mesh, initial encounter (Primary)  -     Case Request; Standing  -     sodium chloride 0.9 % flush 10 mL  -     sodium chloride 0.9 % flush 10 mL  -     sodium chloride 0.9 % infusion 40 mL  -     ceFAZolin (ANCEF) 2,000 mg in sodium chloride 0.9 % 100 mL IVPB  -     ondansetron (ZOFRAN) injection 4 mg  -     Case Request    Other orders  -     Follow Anesthesia Guidelines / Protocol; Future  -     Follow Anesthesia Guidelines / Protocol; Standing  -     Verify NPO Status; Standing  -     Verify / Perform Chlorhexidine Skin Prep; Standing  -     Provide NPO Instructions to Patient; Future  -     Chlorhexidine Skin Prep; Future  -     Insert Peripheral IV; Standing  -     Saline Lock & Maintain IV Access; Standing  -     Place Sequential Compression Device; Standing  -     Maintain Sequential Compression Device; Standing    40-year-old female with infected mesh from previous umbilical hernia repair.  Recommend explantation in the operating room and replacement with biologic mesh.  Risks/benefits/alternatives of the procedure were explained to the patient and she was agreeable to proceed.    Follow Up   Return for Post-op.      Patient was given instructions and counseling regarding her condition or for health maintenance advice. Please see specific information pulled into the AVS if appropriate.     Electronically signed by Vaughn Myers MD, 06/09/25, 9:14 AM EDT.

## 2025-06-09 NOTE — H&P (VIEW-ONLY)
"Chief Complaint  Follow-up (CT SCAN)    Subjective    Subjective     Crystal Mast is a 40 y.o. female who presents to [facility] GENERAL SURGERY    History of Present Illness  40-year-old female who presents today for follow-up after obtaining CT abdomen pelvis last week.  She underwent open umbilical hernia repair with permanent mesh earlier this year.  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  She denies any other issues at this time.      Personal History     Social History     Tobacco Use    Smoking status: Every Day     Current packs/day: 1.00     Average packs/day: 1 pack/day for 22.6 years (22.6 ttl pk-yrs)     Types: Cigarettes     Start date: 10/25/2002     Passive exposure: Current    Smokeless tobacco: Never    Tobacco comments:     LAST SMOKED 2/27 AT 0530   Vaping Use    Vaping status: Never Used   Substance Use Topics    Alcohol use: Never    Drug use: Never     No Known Allergies    Objective    Objective   Vital Signs:   /83 (BP Location: Left arm, Patient Position: Sitting, Cuff Size: Adult)   Pulse 66   Ht 165.1 cm (65\")   Wt 77.4 kg (170 lb 11.2 oz)   BMI 28.41 kg/m²       Physical Exam  Constitutional:       Appearance: Normal appearance.   HENT:      Head: Normocephalic and atraumatic.      Mouth/Throat:      Mouth: Mucous membranes are moist.      Pharynx: Oropharynx is clear.   Cardiovascular:      Rate and Rhythm: Normal rate and regular rhythm.   Pulmonary:      Effort: Pulmonary effort is normal. No respiratory distress.   Abdominal:      General: There is no distension.      Palpations: Abdomen is soft.      Tenderness: There is no abdominal tenderness.      Comments: No palpable umbilical hernia, small area of erythema above the umbilicus   Musculoskeletal:         General: No swelling. Normal range of motion.      Cervical back: Normal range of motion and neck supple.   Skin:     General: Skin is " warm and dry.   Neurological:      General: No focal deficit present.      Mental Status: She is alert and oriented to person, place, and time.   Psychiatric:         Mood and Affect: Mood normal.         Behavior: Behavior normal.                  Assessment / Plan      Diagnoses and all orders for this visit:    1. Infected hernioplasty mesh, initial encounter (Primary)  -     Case Request; Standing  -     sodium chloride 0.9 % flush 10 mL  -     sodium chloride 0.9 % flush 10 mL  -     sodium chloride 0.9 % infusion 40 mL  -     ceFAZolin (ANCEF) 2,000 mg in sodium chloride 0.9 % 100 mL IVPB  -     ondansetron (ZOFRAN) injection 4 mg  -     Case Request    Other orders  -     Follow Anesthesia Guidelines / Protocol; Future  -     Follow Anesthesia Guidelines / Protocol; Standing  -     Verify NPO Status; Standing  -     Verify / Perform Chlorhexidine Skin Prep; Standing  -     Provide NPO Instructions to Patient; Future  -     Chlorhexidine Skin Prep; Future  -     Insert Peripheral IV; Standing  -     Saline Lock & Maintain IV Access; Standing  -     Place Sequential Compression Device; Standing  -     Maintain Sequential Compression Device; Standing    40-year-old female with infected mesh from previous umbilical hernia repair.  Recommend explantation in the operating room and replacement with biologic mesh.  Risks/benefits/alternatives of the procedure were explained to the patient and she was agreeable to proceed.    Follow Up   Return for Post-op.      Patient was given instructions and counseling regarding her condition or for health maintenance advice. Please see specific information pulled into the AVS if appropriate.     Electronically signed by Vaughn Myers MD, 06/09/25, 9:14 AM EDT.

## 2025-06-10 ENCOUNTER — READMISSION MANAGEMENT (OUTPATIENT)
Dept: CALL CENTER | Facility: HOSPITAL | Age: 40
End: 2025-06-10
Payer: COMMERCIAL

## 2025-06-10 ENCOUNTER — ANESTHESIA (OUTPATIENT)
Dept: PERIOP | Facility: HOSPITAL | Age: 40
End: 2025-06-10
Payer: COMMERCIAL

## 2025-06-10 ENCOUNTER — ANESTHESIA EVENT (OUTPATIENT)
Dept: PERIOP | Facility: HOSPITAL | Age: 40
End: 2025-06-10
Payer: COMMERCIAL

## 2025-06-10 ENCOUNTER — HOSPITAL ENCOUNTER (INPATIENT)
Facility: HOSPITAL | Age: 40
LOS: 1 days | Discharge: HOME | DRG: 909 | End: 2025-06-10
Attending: STUDENT IN AN ORGANIZED HEALTH CARE EDUCATION/TRAINING PROGRAM | Admitting: STUDENT IN AN ORGANIZED HEALTH CARE EDUCATION/TRAINING PROGRAM
Payer: COMMERCIAL

## 2025-06-10 VITALS
TEMPERATURE: 97.6 F | HEART RATE: 68 BPM | SYSTOLIC BLOOD PRESSURE: 121 MMHG | RESPIRATION RATE: 12 BRPM | HEIGHT: 64 IN | BODY MASS INDEX: 29.17 KG/M2 | OXYGEN SATURATION: 95 % | WEIGHT: 170.86 LBS | DIASTOLIC BLOOD PRESSURE: 82 MMHG

## 2025-06-10 DIAGNOSIS — T85.79XA INFECTED HERNIOPLASTY MESH, INITIAL ENCOUNTER: ICD-10-CM

## 2025-06-10 PROBLEM — K42.9 UMBILICAL HERNIA: Status: ACTIVE | Noted: 2025-06-10

## 2025-06-10 PROCEDURE — 25010000002 FENTANYL CITRATE (PF) 50 MCG/ML SOLUTION: Performed by: NURSE ANESTHETIST, CERTIFIED REGISTERED

## 2025-06-10 PROCEDURE — 25010000002 KETOROLAC TROMETHAMINE PER 15 MG: Performed by: ANESTHESIOLOGY

## 2025-06-10 PROCEDURE — 25810000003 LACTATED RINGERS PER 1000 ML: Performed by: ANESTHESIOLOGY

## 2025-06-10 PROCEDURE — 25010000002 DEXAMETHASONE PER 1 MG: Performed by: NURSE ANESTHETIST, CERTIFIED REGISTERED

## 2025-06-10 PROCEDURE — 25010000002 MIDAZOLAM PER 1MG: Performed by: ANESTHESIOLOGY

## 2025-06-10 PROCEDURE — 25010000002 HYDROMORPHONE 1 MG/ML SOLUTION: Performed by: NURSE ANESTHETIST, CERTIFIED REGISTERED

## 2025-06-10 PROCEDURE — 0WPF0JZ REMOVAL OF SYNTHETIC SUBSTITUTE FROM ABDOMINAL WALL, OPEN APPROACH: ICD-10-PCS | Performed by: STUDENT IN AN ORGANIZED HEALTH CARE EDUCATION/TRAINING PROGRAM

## 2025-06-10 PROCEDURE — 11008 RMV PRSTC MTRL/MESH ABD WALL: CPT | Performed by: STUDENT IN AN ORGANIZED HEALTH CARE EDUCATION/TRAINING PROGRAM

## 2025-06-10 PROCEDURE — 25010000002 CEFAZOLIN PER 500 MG: Performed by: STUDENT IN AN ORGANIZED HEALTH CARE EDUCATION/TRAINING PROGRAM

## 2025-06-10 PROCEDURE — 25010000002 SUGAMMADEX 200 MG/2ML SOLUTION: Performed by: NURSE ANESTHETIST, CERTIFIED REGISTERED

## 2025-06-10 PROCEDURE — 10180 I&D COMPLEX PO WOUND INFCTJ: CPT | Performed by: STUDENT IN AN ORGANIZED HEALTH CARE EDUCATION/TRAINING PROGRAM

## 2025-06-10 PROCEDURE — 88302 TISSUE EXAM BY PATHOLOGIST: CPT | Performed by: STUDENT IN AN ORGANIZED HEALTH CARE EDUCATION/TRAINING PROGRAM

## 2025-06-10 PROCEDURE — 25010000002 LIDOCAINE PF 2% 2 % SOLUTION: Performed by: NURSE ANESTHETIST, CERTIFIED REGISTERED

## 2025-06-10 PROCEDURE — 25010000002 ONDANSETRON PER 1 MG: Performed by: NURSE ANESTHETIST, CERTIFIED REGISTERED

## 2025-06-10 PROCEDURE — C1781 MESH (IMPLANTABLE): HCPCS | Performed by: STUDENT IN AN ORGANIZED HEALTH CARE EDUCATION/TRAINING PROGRAM

## 2025-06-10 PROCEDURE — 25010000002 LIDOCAINE 1% - EPINEPHRINE 1:100000 1 %-1:100000 SOLUTION: Performed by: STUDENT IN AN ORGANIZED HEALTH CARE EDUCATION/TRAINING PROGRAM

## 2025-06-10 PROCEDURE — 25010000002 PROPOFOL 10 MG/ML EMULSION: Performed by: NURSE ANESTHETIST, CERTIFIED REGISTERED

## 2025-06-10 DEVICE — PHASIX ST MESH, 11 CM (4.5"), CIRCLE
Type: IMPLANTABLE DEVICE | Site: ABDOMEN | Status: FUNCTIONAL
Brand: PHASIX

## 2025-06-10 RX ORDER — ONDANSETRON 2 MG/ML
INJECTION INTRAMUSCULAR; INTRAVENOUS AS NEEDED
Status: DISCONTINUED | OUTPATIENT
Start: 2025-06-10 | End: 2025-06-10 | Stop reason: SURG

## 2025-06-10 RX ORDER — PROMETHAZINE HYDROCHLORIDE 25 MG/1
25 TABLET ORAL ONCE AS NEEDED
Status: DISCONTINUED | OUTPATIENT
Start: 2025-06-10 | End: 2025-06-10 | Stop reason: HOSPADM

## 2025-06-10 RX ORDER — ACETAMINOPHEN 500 MG
1000 TABLET ORAL ONCE
Status: COMPLETED | OUTPATIENT
Start: 2025-06-10 | End: 2025-06-10

## 2025-06-10 RX ORDER — DEXAMETHASONE SODIUM PHOSPHATE 4 MG/ML
INJECTION, SOLUTION INTRA-ARTICULAR; INTRALESIONAL; INTRAMUSCULAR; INTRAVENOUS; SOFT TISSUE AS NEEDED
Status: DISCONTINUED | OUTPATIENT
Start: 2025-06-10 | End: 2025-06-10 | Stop reason: SURG

## 2025-06-10 RX ORDER — OXYCODONE AND ACETAMINOPHEN 7.5; 325 MG/1; MG/1
1 TABLET ORAL EVERY 6 HOURS PRN
Qty: 20 TABLET | Refills: 0 | Status: SHIPPED | OUTPATIENT
Start: 2025-06-10

## 2025-06-10 RX ORDER — MAGNESIUM HYDROXIDE 1200 MG/15ML
LIQUID ORAL AS NEEDED
Status: DISCONTINUED | OUTPATIENT
Start: 2025-06-10 | End: 2025-06-10 | Stop reason: HOSPADM

## 2025-06-10 RX ORDER — ONDANSETRON 4 MG/1
4 TABLET, ORALLY DISINTEGRATING ORAL ONCE AS NEEDED
Status: DISCONTINUED | OUTPATIENT
Start: 2025-06-10 | End: 2025-06-10 | Stop reason: HOSPADM

## 2025-06-10 RX ORDER — LIDOCAINE HYDROCHLORIDE 20 MG/ML
INJECTION, SOLUTION EPIDURAL; INFILTRATION; INTRACAUDAL; PERINEURAL AS NEEDED
Status: DISCONTINUED | OUTPATIENT
Start: 2025-06-10 | End: 2025-06-10 | Stop reason: SURG

## 2025-06-10 RX ORDER — SODIUM CHLORIDE 0.9 % (FLUSH) 0.9 %
10 SYRINGE (ML) INJECTION EVERY 12 HOURS SCHEDULED
Status: DISCONTINUED | OUTPATIENT
Start: 2025-06-10 | End: 2025-06-10 | Stop reason: HOSPADM

## 2025-06-10 RX ORDER — ONDANSETRON 2 MG/ML
4 INJECTION INTRAMUSCULAR; INTRAVENOUS ONCE AS NEEDED
Status: DISCONTINUED | OUTPATIENT
Start: 2025-06-10 | End: 2025-06-10 | Stop reason: HOSPADM

## 2025-06-10 RX ORDER — LIDOCAINE HYDROCHLORIDE AND EPINEPHRINE 10; 10 MG/ML; UG/ML
INJECTION, SOLUTION INFILTRATION; PERINEURAL AS NEEDED
Status: DISCONTINUED | OUTPATIENT
Start: 2025-06-10 | End: 2025-06-10 | Stop reason: HOSPADM

## 2025-06-10 RX ORDER — KETOROLAC TROMETHAMINE 30 MG/ML
30 INJECTION, SOLUTION INTRAMUSCULAR; INTRAVENOUS ONCE
Status: COMPLETED | OUTPATIENT
Start: 2025-06-10 | End: 2025-06-10

## 2025-06-10 RX ORDER — SULFAMETHOXAZOLE AND TRIMETHOPRIM 800; 160 MG/1; MG/1
1 TABLET ORAL 2 TIMES DAILY
Qty: 14 TABLET | Refills: 0 | Status: SHIPPED | OUTPATIENT
Start: 2025-06-10

## 2025-06-10 RX ORDER — PETROLATUM,WHITE
OINTMENT IN PACKET (GRAM) TOPICAL AS NEEDED
Status: DISCONTINUED | OUTPATIENT
Start: 2025-06-10 | End: 2025-06-10 | Stop reason: SURG

## 2025-06-10 RX ORDER — ONDANSETRON 2 MG/ML
4 INJECTION INTRAMUSCULAR; INTRAVENOUS EVERY 6 HOURS PRN
Status: DISCONTINUED | OUTPATIENT
Start: 2025-06-10 | End: 2025-06-10 | Stop reason: HOSPADM

## 2025-06-10 RX ORDER — OXYCODONE HYDROCHLORIDE 5 MG/1
5 TABLET ORAL
Refills: 0 | Status: COMPLETED | OUTPATIENT
Start: 2025-06-10 | End: 2025-06-10

## 2025-06-10 RX ORDER — ROCURONIUM BROMIDE 10 MG/ML
INJECTION, SOLUTION INTRAVENOUS AS NEEDED
Status: DISCONTINUED | OUTPATIENT
Start: 2025-06-10 | End: 2025-06-10 | Stop reason: SURG

## 2025-06-10 RX ORDER — SODIUM CHLORIDE 0.9 % (FLUSH) 0.9 %
10 SYRINGE (ML) INJECTION AS NEEDED
Status: DISCONTINUED | OUTPATIENT
Start: 2025-06-10 | End: 2025-06-10 | Stop reason: HOSPADM

## 2025-06-10 RX ORDER — PROMETHAZINE HYDROCHLORIDE 25 MG/1
25 SUPPOSITORY RECTAL ONCE AS NEEDED
Status: DISCONTINUED | OUTPATIENT
Start: 2025-06-10 | End: 2025-06-10 | Stop reason: HOSPADM

## 2025-06-10 RX ORDER — SODIUM CHLORIDE 9 MG/ML
40 INJECTION, SOLUTION INTRAVENOUS AS NEEDED
Status: DISCONTINUED | OUTPATIENT
Start: 2025-06-10 | End: 2025-06-10 | Stop reason: HOSPADM

## 2025-06-10 RX ORDER — MIDAZOLAM HYDROCHLORIDE 2 MG/2ML
2 INJECTION, SOLUTION INTRAMUSCULAR; INTRAVENOUS ONCE
Status: COMPLETED | OUTPATIENT
Start: 2025-06-10 | End: 2025-06-10

## 2025-06-10 RX ORDER — FENTANYL CITRATE 50 UG/ML
INJECTION, SOLUTION INTRAMUSCULAR; INTRAVENOUS AS NEEDED
Status: DISCONTINUED | OUTPATIENT
Start: 2025-06-10 | End: 2025-06-10 | Stop reason: SURG

## 2025-06-10 RX ORDER — SODIUM CHLORIDE, SODIUM LACTATE, POTASSIUM CHLORIDE, CALCIUM CHLORIDE 600; 310; 30; 20 MG/100ML; MG/100ML; MG/100ML; MG/100ML
9 INJECTION, SOLUTION INTRAVENOUS CONTINUOUS PRN
Status: DISCONTINUED | OUTPATIENT
Start: 2025-06-10 | End: 2025-06-10 | Stop reason: HOSPADM

## 2025-06-10 RX ORDER — PROPOFOL 10 MG/ML
VIAL (ML) INTRAVENOUS AS NEEDED
Status: DISCONTINUED | OUTPATIENT
Start: 2025-06-10 | End: 2025-06-10 | Stop reason: SURG

## 2025-06-10 RX ORDER — SCOPOLAMINE 1 MG/3D
1 PATCH, EXTENDED RELEASE TRANSDERMAL ONCE
Status: DISCONTINUED | OUTPATIENT
Start: 2025-06-10 | End: 2025-06-10 | Stop reason: HOSPADM

## 2025-06-10 RX ADMIN — ONDANSETRON 4 MG: 2 INJECTION INTRAMUSCULAR; INTRAVENOUS at 10:59

## 2025-06-10 RX ADMIN — HYDROMORPHONE HYDROCHLORIDE 0.5 MG: 1 INJECTION, SOLUTION INTRAMUSCULAR; INTRAVENOUS; SUBCUTANEOUS at 11:29

## 2025-06-10 RX ADMIN — OXYCODONE 5 MG: 5 TABLET ORAL at 11:46

## 2025-06-10 RX ADMIN — SODIUM CHLORIDE, POTASSIUM CHLORIDE, SODIUM LACTATE AND CALCIUM CHLORIDE 9 ML/HR: 600; 310; 30; 20 INJECTION, SOLUTION INTRAVENOUS at 09:30

## 2025-06-10 RX ADMIN — FENTANYL CITRATE 50 MCG: 50 INJECTION, SOLUTION INTRAMUSCULAR; INTRAVENOUS at 10:33

## 2025-06-10 RX ADMIN — HYDROMORPHONE HYDROCHLORIDE 0.5 MG: 1 INJECTION, SOLUTION INTRAMUSCULAR; INTRAVENOUS; SUBCUTANEOUS at 11:15

## 2025-06-10 RX ADMIN — HYDROMORPHONE HYDROCHLORIDE 0.25 MG: 1 INJECTION, SOLUTION INTRAMUSCULAR; INTRAVENOUS; SUBCUTANEOUS at 11:47

## 2025-06-10 RX ADMIN — HYDROMORPHONE HYDROCHLORIDE 0.25 MG: 1 INJECTION, SOLUTION INTRAMUSCULAR; INTRAVENOUS; SUBCUTANEOUS at 11:36

## 2025-06-10 RX ADMIN — OXYCODONE 5 MG: 5 TABLET ORAL at 11:29

## 2025-06-10 RX ADMIN — SODIUM CHLORIDE 2000 MG: 9 INJECTION, SOLUTION INTRAVENOUS at 10:24

## 2025-06-10 RX ADMIN — DEXAMETHASONE SODIUM PHOSPHATE 4 MG: 4 INJECTION, SOLUTION INTRAMUSCULAR; INTRAVENOUS at 10:26

## 2025-06-10 RX ADMIN — HYDROMORPHONE HYDROCHLORIDE 0.25 MG: 1 INJECTION, SOLUTION INTRAMUSCULAR; INTRAVENOUS; SUBCUTANEOUS at 11:53

## 2025-06-10 RX ADMIN — SUGAMMADEX 200 MG: 100 INJECTION, SOLUTION INTRAVENOUS at 11:00

## 2025-06-10 RX ADMIN — KETOROLAC TROMETHAMINE 30 MG: 30 INJECTION, SOLUTION INTRAMUSCULAR; INTRAVENOUS at 12:13

## 2025-06-10 RX ADMIN — PROPOFOL 150 MG: 10 INJECTION, EMULSION INTRAVENOUS at 10:17

## 2025-06-10 RX ADMIN — HYDROMORPHONE HYDROCHLORIDE 0.5 MG: 1 INJECTION, SOLUTION INTRAMUSCULAR; INTRAVENOUS; SUBCUTANEOUS at 11:22

## 2025-06-10 RX ADMIN — ACETAMINOPHEN 1000 MG: 500 TABLET ORAL at 09:30

## 2025-06-10 RX ADMIN — ROCURONIUM BROMIDE 50 MG: 10 INJECTION, SOLUTION INTRAVENOUS at 10:17

## 2025-06-10 RX ADMIN — LIDOCAINE HYDROCHLORIDE 100 MG: 20 INJECTION, SOLUTION INTRAVENOUS at 10:17

## 2025-06-10 RX ADMIN — MIDAZOLAM HYDROCHLORIDE 2 MG: 1 INJECTION, SOLUTION INTRAMUSCULAR; INTRAVENOUS at 10:09

## 2025-06-10 RX ADMIN — SCOPOLAMINE 1 PATCH: 1.5 PATCH, EXTENDED RELEASE TRANSDERMAL at 09:30

## 2025-06-10 RX ADMIN — FENTANYL CITRATE 50 MCG: 50 INJECTION, SOLUTION INTRAMUSCULAR; INTRAVENOUS at 10:17

## 2025-06-10 RX ADMIN — HYDROMORPHONE HYDROCHLORIDE 0.25 MG: 1 INJECTION, SOLUTION INTRAMUSCULAR; INTRAVENOUS; SUBCUTANEOUS at 11:58

## 2025-06-10 RX ADMIN — HYDROMORPHONE HYDROCHLORIDE 0.5 MG: 1 INJECTION, SOLUTION INTRAMUSCULAR; INTRAVENOUS; SUBCUTANEOUS at 10:39

## 2025-06-10 RX ADMIN — Medication 1 G: at 10:17

## 2025-06-10 NOTE — ANESTHESIA POSTPROCEDURE EVALUATION
Patient: Crystal Mast    Procedure Summary       Date: 06/10/25 Room / Location: [facility] OR 11 / [facility] MAIN OR    Anesthesia Start: 1014 Anesthesia Stop: 1122    Procedure: removal of umbilical hernia mesh and UMBILICAL HERNIA REPAIR with dissolvable mesh (Abdomen) Diagnosis:       Infected hernioplasty mesh, initial encounter      (Infected hernioplasty mesh, initial encounter [T85.79XA])    Surgeons: Vaughn Myers MD Provider: Byron Mandujano MD    Anesthesia Type: general ASA Status: 3            Anesthesia Type: general    Vitals  Vitals Value Taken Time   /78 06/10/25 11:45   Temp 36.4 °C (97.6 °F) 06/10/25 11:20   Pulse 68 06/10/25 11:47   Resp 18 06/10/25 11:45   SpO2 94 % 06/10/25 11:47   Vitals shown include unfiled device data.      Post Anesthesia Care and Evaluation    Patient location during evaluation: bedside  Patient participation: complete - patient participated  Level of consciousness: awake    Airway patency: patent  PONV Status: none  Cardiovascular status: acceptable  Respiratory status: acceptable  Hydration status: acceptable

## 2025-06-10 NOTE — DISCHARGE INSTRUCTIONS
DISCHARGE INSTRUCTIONS  HERNIA      For your surgery you had:  General anesthesia (you may have a sore throat for the first 24 hours)    You may experience dizziness, drowsiness, or light-headedness for several hours following surgery/procedure.  Do not stay alone today or tonight.  Limit your activity for 24 hours.  Resume your diet slowly.  Follow whatever special dietary instructions you may have been given by your doctor.  You should not drive, operate machinery, drink alcohol, or sign legally binding documents for 24 hours or while you are taking pain medication.  NOTIFY YOUR DOCTOR IF YOU EXPERIENCE ANY OF THE FOLLOWING:  Temperature greater than 101 degrees Fahrenheit  Shaking Chills  Redness or excessive drainage from incision  Nausea, vomiting and/or pain that is not controlled by prescribed medications  Increase in bleeding or bleeding that is excessive  Unable to urinate in 6 hours after surgery  If unable to reach your doctor, please go to the closest Emergency Room [] You may remove dressing:   [] in 24 hours   [] in 48 hours   [x] Skin adhesive flakes off in 10-14 days.   [x] You may shower tomorrow, no submerging of incisions for 2 weeks.  [] Use abdominal binder every day for two weeks, only taking off for sleeping and showering.  [] Wear a jockey support or tight fitting briefs to prevent swelling.    Do not do any heavy lifting, pushing or pulling.  You may walk up and down stairs.  You may ride in a car but do not drive until instructed by your physician.  Avoid constipation.  Apply an ice pack for 24-48 hours  If unable to urinate in 6 to 8 hours after surgery or urinating frequently in small amounts, notify your doctor or go to the nearest Emergency Room.  Medications per physician instructions as indicated on Discharge Medication Information Sheet.    Last dose of pain medication was given at:   Tylenol 1000mg 0930 do not exceed 4000mg in 24hours  OXYCODONE 10MG 1146 MAY REPEA 5:45  PM  TORADOL 30MG AT 12:13MAY USE IBUPROFEN AFTER 6PM  .    SPECIAL INSTRUCTIONS:  SEE AFTER VISIT SUMMARY

## 2025-06-10 NOTE — OP NOTE
UMBILICAL HERNIA REPAIR  Procedure Report    Patient Name:  Crystal Mast  YOB: 1985    Date of Surgery:  6/10/2025     Indications: 40-year-old female with history of previous open umbilical hernia repair with permanent mesh presents today for mesh explantation due to infection.    Pre-op Diagnosis:   Infected hernioplasty mesh, initial encounter [T85.79XA]       Post-Op Diagnosis Codes:     * Infected hernioplasty mesh, initial encounter [T85.79XA]    Procedure/CPT® Codes:  No CPT Code Applied in Case Entry    Procedure(s):  Explantation of permanent umbilical hernia mesh  Umbilical hernia repair with underlay biologic mesh placement        Staff:  Surgeon(s):  Vaughn Myers MD    Assistant: Tiarra Álvarez CSA    Anesthesia: General    Estimated Blood Loss: minimal    Implants:    Implant Name Type Inv. Item Serial No.  Lot No. LRB No. Used Action   MESH DUY PHASIX ST RND 4.5IN - [id number] Implant MESH DUY PHASIX ST RND 4.5IN  [employer]  (DIV OF [employer]) [id number] N/A 1 Implanted       Specimen:          Specimens       ID Source Type Tests Collected By Collected At Frozen?    A Abdominal Wall Hardware / Foreign Body TISSUE PATHOLOGY EXAM   Vaughn Myers MD 6/10/25 1037     Description: umbilical mesh                Findings: Chronically infected permanent hernia mesh without associated fluid collection    Complications: None apparent at the time of surgery    Description of Procedure: Informed consent was obtained before the patient was brought to the operating suite and placed in supine position.  General endotracheal anesthesia was induced maintained throughout the procedure.  The patient's abdomen was prepped and draped in a standard sterile fashion before a timeout procedure was performed, verifying the correct patient, procedure, and other pertinent information.    Local anesthetic was administered periumbilically.  Using a #15 blade scalpel, previous infraumbilical  incision was opened.  Sharp dissection was carried down to the cicatrix.  Blunt dissection was used to circumferentially isolate the cicatrix and retracted away from the anterior abdominal wall.  Umbilicus was sharply taken off the fascia, revealing no hernia defect.  Fascial sutures from the previous operation were identified and cut, exposing underlying mesh.  There was no fluid collection around the mesh but the fascial plane around the area was noted to be mildly inflamed; this was sharply debrided using electrocautery.  Again using a #15 blade scalpel, permanent mesh was sharply taken off the fascia.  Specimen was passed off the table for analysis by pathology.  Finger sweep within the abdomen confirmed a flat plane for planned biologic mesh placement.  An appropriately sized Phasix ST mesh was placed intraperitoneally and secured circumferentially using #1 PDS.  Overlying fascia was closed again using interrupted #1 PDS in figure-of-eight fashion.  Hemostasis was verified.  Wound was thoroughly irrigated with Irrisept solution.  Healthy appearing cicatrix was tacked to the underlying fascia using a single 3-0 Vicryl.  Deep dermal layer was reapproximated using interrupted 3-0 Vicryl.  Skin was closed using running subcuticular 4-0 Monocryl before application of Exofin over top to conclude the procedure.    Patient tolerated the procedure well and there were no immediate complications.  All counts were correct x 2 at the end of the procedure.    Disposition: Stable in PACU        The surgical first assist listed above assisted with all needed aspects of the procedure.    Electronically signed by Vaughn Myers MD, 06/10/25, 11:03 AM EDT.

## 2025-06-10 NOTE — PAYOR COMM NOTE
"UR DEPARTMENT    Carline Rodriguez RN  Phone [phone]  Fax [phone]    [address] [place] [address]    NPI [id number]  TAX ID [id number]    PHYSICIAN NAME AND NPI  YURY CLEMENS  [id number]    BED TYPE INPATIENT MED/SURG    TYPE OF ADMISSION: POST OP ADMISSION    ICD 10 CODE  T85.7XA,   CPT 65629    DATE OF SERVICE 06/10/2025      Aaliyah Irwin (40 y.o. Female)       Date of Birth   1985    Social Security Number       Address   [address] [place] [address]    Home Phone   [phone]    MRN   [id number]       Church   None    Marital Status                               Admission Date   6/10/2025    Admission Type   Elective    Admitting Provider   Yury Clemens MD    Attending Provider   Yury Clemens MD    Department, Room/Bed   [facility] MAIN OR, [facility] MAIN OR/MAIN OR       Discharge Date       Discharge Disposition   Home or Self Care    Discharge Destination                                 Attending Provider: Yury Clemens MD    Allergies: No Known Allergies    Isolation: None   Infection: None   Code Status: Not on file    Ht: 162.6 cm (64\")   Wt: 77.5 kg (170 lb 13.7 oz)    Admission Cmt: None   Principal Problem: Infected hernioplasty mesh [T85.79XA]                   Active Insurance as of 6/10/2025       Primary Coverage       Payor Plan Insurance Group Employer/Plan Group    [facility] BY CHRISTINA [facility] BY CHRISTINA [id number]       Payor Plan Address Payor Plan Phone Number Payor Plan Fax Number Effective Dates    [address] [address]   4/1/2025 - None Entered    [facility] [phone]         Subscriber Name Subscriber Birth Date Member ID       AALIYAH IRWIN 1985 [id number]                     Emergency Contacts        (Rel.) Home Phone Work Phone Mobile Phone    BEVERLEY IRWIN (Spouse) -- -- [phone]           Hernia Repair (Non-Hiatal) RRG Clinical Indications for Procedure and " Care       Indications Met   Last updated by Carline Rodriguez on 6/10/2025 1147     Review Status Created By   Primary Completed Carline Rodriguez      Criteria Review   Hernia Repair (Non-Hiatal) RR Clinical Indications for Procedure and Care     Overall Determination: Indications Met     Criteria:  [×] Procedure is indicated for  1 or more  of the following :      [×] Umbilical hernia and  1 or more  of the following :          [×] Symptoms              6/10/2025 11:47 AM                  -- 6/10/2025 11:47 AM by Carline Rodriguez --                      Tenderness around the area and imaging findings concerning for chronically infected mesh.         [facility]: JOANNE [id number] Tax ID [id number]     History & Physical        Vaughn Myers MD at 06/10/25 0937          H&P reviewed.  The patient was examined and there are no changes to the H&P  Electronically signed by Vaughn Myers MD at 06/10/25 0908   Source Note: H&P (View-Only)          Chief Complaint  Follow-up (CT SCAN)    Subjective   Subjective     Crystal Mast is a 40 y.o. female who presents to [facility] GENERAL SURGERY    History of Present Illness  40-year-old female who presents today for follow-up after obtaining CT abdomen pelvis last week.  She underwent open umbilical hernia repair with permanent mesh earlier this year.  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  She denies any other issues at this time.      Personal History     Social History     Tobacco Use   • Smoking status: Every Day     Current packs/day: 1.00     Average packs/day: 1 pack/day for 22.6 years (22.6 ttl pk-yrs)     Types: Cigarettes     Start date: 10/25/2002     Passive exposure: Current   • Smokeless tobacco: Never   • Tobacco comments:     LAST SMOKED 2/27 AT 0530   Vaping Use   • Vaping status: Never Used   Substance Use Topics   • Alcohol use: Never   • Drug use: Never     No Known Allergies    Objective  "  Objective   Vital Signs:   /83 (BP Location: Left arm, Patient Position: Sitting, Cuff Size: Adult)   Pulse 66   Ht 165.1 cm (65\")   Wt 77.4 kg (170 lb 11.2 oz)   BMI 28.41 kg/m²       Physical Exam  Constitutional:       Appearance: Normal appearance.   HENT:      Head: Normocephalic and atraumatic.      Mouth/Throat:      Mouth: Mucous membranes are moist.      Pharynx: Oropharynx is clear.   Cardiovascular:      Rate and Rhythm: Normal rate and regular rhythm.   Pulmonary:      Effort: Pulmonary effort is normal. No respiratory distress.   Abdominal:      General: There is no distension.      Palpations: Abdomen is soft.      Tenderness: There is no abdominal tenderness.      Comments: No palpable umbilical hernia, small area of erythema above the umbilicus   Musculoskeletal:         General: No swelling. Normal range of motion.      Cervical back: Normal range of motion and neck supple.   Skin:     General: Skin is warm and dry.   Neurological:      General: No focal deficit present.      Mental Status: She is alert and oriented to person, place, and time.   Psychiatric:         Mood and Affect: Mood normal.         Behavior: Behavior normal.                  Assessment / Plan      Diagnoses and all orders for this visit:    1. Infected hernioplasty mesh, initial encounter (Primary)  -     Case Request; Standing  -     sodium chloride 0.9 % flush 10 mL  -     sodium chloride 0.9 % flush 10 mL  -     sodium chloride 0.9 % infusion 40 mL  -     ceFAZolin (ANCEF) 2,000 mg in sodium chloride 0.9 % 100 mL IVPB  -     ondansetron (ZOFRAN) injection 4 mg  -     Case Request    Other orders  -     Follow Anesthesia Guidelines / Protocol; Future  -     Follow Anesthesia Guidelines / Protocol; Standing  -     Verify NPO Status; Standing  -     Verify / Perform Chlorhexidine Skin Prep; Standing  -     Provide NPO Instructions to Patient; Future  -     Chlorhexidine Skin Prep; Future  -     Insert Peripheral IV; " Standing  -     Saline Lock & Maintain IV Access; Standing  -     Place Sequential Compression Device; Standing  -     Maintain Sequential Compression Device; Standing    40-year-old female with infected mesh from previous umbilical hernia repair.  Recommend explantation in the operating room and replacement with biologic mesh.  Risks/benefits/alternatives of the procedure were explained to the patient and she was agreeable to proceed.    Follow Up   Return for Post-op.      Patient was given instructions and counseling regarding her condition or for health maintenance advice. Please see specific information pulled into the AVS if appropriate.     Electronically signed by Vaughn Myers MD, 06/09/25, 9:14 AM EDT.    Electronically signed by Vaughn Myers MD at 06/09/25 0915                 Vaughn Myers MD at 06/09/25 0830          Chief Complaint  Follow-up (CT SCAN)    Subjective   Subjective     Crystal Mast is a 40 y.o. female who presents to [facility] GENERAL SURGERY    History of Present Illness  40-year-old female who presents today for follow-up after obtaining CT abdomen pelvis last week.  She underwent open umbilical hernia repair with permanent mesh earlier this year.  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  She denies any other issues at this time.      Personal History     Social History     Tobacco Use   • Smoking status: Every Day     Current packs/day: 1.00     Average packs/day: 1 pack/day for 22.6 years (22.6 ttl pk-yrs)     Types: Cigarettes     Start date: 10/25/2002     Passive exposure: Current   • Smokeless tobacco: Never   • Tobacco comments:     LAST SMOKED 2/27 AT 0530   Vaping Use   • Vaping status: Never Used   Substance Use Topics   • Alcohol use: Never   • Drug use: Never     No Known Allergies    Objective   Objective   Vital Signs:   /83 (BP Location: Left arm, Patient Position: Sitting,  "Cuff Size: Adult)   Pulse 66   Ht 165.1 cm (65\")   Wt 77.4 kg (170 lb 11.2 oz)   BMI 28.41 kg/m²       Physical Exam  Constitutional:       Appearance: Normal appearance.   HENT:      Head: Normocephalic and atraumatic.      Mouth/Throat:      Mouth: Mucous membranes are moist.      Pharynx: Oropharynx is clear.   Cardiovascular:      Rate and Rhythm: Normal rate and regular rhythm.   Pulmonary:      Effort: Pulmonary effort is normal. No respiratory distress.   Abdominal:      General: There is no distension.      Palpations: Abdomen is soft.      Tenderness: There is no abdominal tenderness.      Comments: No palpable umbilical hernia, small area of erythema above the umbilicus   Musculoskeletal:         General: No swelling. Normal range of motion.      Cervical back: Normal range of motion and neck supple.   Skin:     General: Skin is warm and dry.   Neurological:      General: No focal deficit present.      Mental Status: She is alert and oriented to person, place, and time.   Psychiatric:         Mood and Affect: Mood normal.         Behavior: Behavior normal.                  Assessment / Plan      Diagnoses and all orders for this visit:    1. Infected hernioplasty mesh, initial encounter (Primary)  -     Case Request; Standing  -     sodium chloride 0.9 % flush 10 mL  -     sodium chloride 0.9 % flush 10 mL  -     sodium chloride 0.9 % infusion 40 mL  -     ceFAZolin (ANCEF) 2,000 mg in sodium chloride 0.9 % 100 mL IVPB  -     ondansetron (ZOFRAN) injection 4 mg  -     Case Request    Other orders  -     Follow Anesthesia Guidelines / Protocol; Future  -     Follow Anesthesia Guidelines / Protocol; Standing  -     Verify NPO Status; Standing  -     Verify / Perform Chlorhexidine Skin Prep; Standing  -     Provide NPO Instructions to Patient; Future  -     Chlorhexidine Skin Prep; Future  -     Insert Peripheral IV; Standing  -     Saline Lock & Maintain IV Access; Standing  -     Place Sequential " Compression Device; Standing  -     Maintain Sequential Compression Device; Standing    40-year-old female with infected mesh from previous umbilical hernia repair.  Recommend explantation in the operating room and replacement with biologic mesh.  Risks/benefits/alternatives of the procedure were explained to the patient and she was agreeable to proceed.    Follow Up   Return for Post-op.      Patient was given instructions and counseling regarding her condition or for health maintenance advice. Please see specific information pulled into the AVS if appropriate.     Electronically signed by Vaughn Myers MD, 06/09/25, 9:14 AM EDT.    Electronically signed by Vaughn Myers MD at 06/09/25 0915       Current Facility-Administered Medications   Medication Dose Route Frequency Provider Last Rate Last Admin   • HYDROmorphone (DILAUDID) injection 0.25 mg  0.25 mg Intravenous Q5 Min PRN Long Bo CRNA   0.25 mg at 06/10/25 1153   • lactated ringers infusion  9 mL/hr Intravenous Continuous PRN Byron Mandujano MD 9 mL/hr at 06/10/25 1014 Currently Infusing at 06/10/25 1014   • ondansetron (ZOFRAN) injection 4 mg  4 mg Intravenous Q6H PRN Vaughn Myers MD       • ondansetron (ZOFRAN) injection 4 mg  4 mg Intravenous Once PRN Long Bo CRNA       • ondansetron ODT (ZOFRAN-ODT) disintegrating tablet 4 mg  4 mg Oral Once PRN Vaughn Myers MD       • promethazine (PHENERGAN) suppository 25 mg  25 mg Rectal Once PRN Long Bo CRNA        Or   • promethazine (PHENERGAN) tablet 25 mg  25 mg Oral Once PRN Long Bo CRNA       • scopolamine patch 1 mg/72 hr  1 patch Transdermal Once Byron Mandujano MD   1 patch at 06/10/25 0930        Operative/Procedure Notes (last 24 hours)        Vaughn Myers MD at 06/10/25 1029          UMBILICAL HERNIA REPAIR  Procedure Report    Patient Name:  Crystal Mast  YOB: 1985    Date of Surgery:  6/10/2025      Indications: 40-year-old female with history of previous open umbilical hernia repair with permanent mesh presents today for mesh explantation due to infection.    Pre-op Diagnosis:   Infected hernioplasty mesh, initial encounter [T85.79XA]       Post-Op Diagnosis Codes:     * Infected hernioplasty mesh, initial encounter [T85.79XA]    Procedure/CPT® Codes:  No CPT Code Applied in Case Entry    Procedure(s):  Explantation of permanent umbilical hernia mesh  Umbilical hernia repair with underlay biologic mesh placement        Staff:  Surgeon(s):  Vaughn Myers MD    Assistant: Tiarra Álvarez CSA    Anesthesia: General    Estimated Blood Loss: minimal    Implants:    Implant Name Type Inv. Item Serial No.  Lot No. LRB No. Used Action   MESH DUY PHASIX ST RND 4.5IN - [id number] Implant MESH DUY PHASIX ST RND 4.5IN  DAVOL  (DIV OF CR ASI System Integration) [id number] N/A 1 Implanted       Specimen:          Specimens       ID Source Type Tests Collected By Collected At Frozen?    A Abdominal Wall Hardware / Foreign Body TISSUE PATHOLOGY EXAM   Vaughn Myers MD 6/10/25 1037     Description: umbilical mesh                Findings: Chronically infected permanent hernia mesh without associated fluid collection    Complications: None apparent at the time of surgery    Description of Procedure: Informed consent was obtained before the patient was brought to the operating suite and placed in supine position.  General endotracheal anesthesia was induced maintained throughout the procedure.  The patient's abdomen was prepped and draped in a standard sterile fashion before a timeout procedure was performed, verifying the correct patient, procedure, and other pertinent information.    Local anesthetic was administered periumbilically.  Using a #15 blade scalpel, previous infraumbilical incision was opened.  Sharp dissection was carried down to the cicatrix.  Blunt dissection was used to circumferentially isolate the  cicatrix and retracted away from the anterior abdominal wall.  Umbilicus was sharply taken off the fascia, revealing no hernia defect.  Fascial sutures from the previous operation were identified and cut, exposing underlying mesh.  There was no fluid collection around the mesh but the fascial plane around the area was noted to be mildly inflamed; this was sharply debrided using electrocautery.  Again using a #15 blade scalpel, permanent mesh was sharply taken off the fascia.  Specimen was passed off the table for analysis by pathology.  Finger sweep within the abdomen confirmed a flat plane for planned biologic mesh placement.  An appropriately sized Phasix ST mesh was placed intraperitoneally and secured circumferentially using #1 PDS.  Overlying fascia was closed again using interrupted #1 PDS in figure-of-eight fashion.  Hemostasis was verified.  Wound was thoroughly irrigated with Irrisept solution.  Healthy appearing cicatrix was tacked to the underlying fascia using a single 3-0 Vicryl.  Deep dermal layer was reapproximated using interrupted 3-0 Vicryl.  Skin was closed using running subcuticular 4-0 Monocryl before application of Exofin over top to conclude the procedure.    Patient tolerated the procedure well and there were no immediate complications.  All counts were correct x 2 at the end of the procedure.    Disposition: Stable in PACU        The surgical first assist listed above assisted with all needed aspects of the procedure.    Electronically signed by Vaughn Myers MD, 06/10/25, 11:03 AM EDT.    Electronically signed by Vaughn Myers MD at 06/10/25 1108       Physician Progress Notes (last 24 hours)  Notes from 06/09/25 1155 through 06/10/25 1155   No notes of this type exist for this encounter.

## 2025-06-10 NOTE — ANESTHESIA PREPROCEDURE EVALUATION
Anesthesia Evaluation     Patient summary reviewed and Nursing notes reviewed   no history of anesthetic complications:   NPO Solid Status: > 8 hours  NPO Liquid Status: > 2 hours           Airway   Mallampati: II  TM distance: >3 FB  Neck ROM: full  Possible difficult intubation and Large neck circumference  Dental    (+) poor dentition        Pulmonary - normal exam    breath sounds clear to auscultation  (+) a smoker Current, asthma,  Cardiovascular - normal exam  Exercise tolerance: good (4-7 METS)    Rhythm: regular  Rate: normal    (+) hypertension (no meds)      Neuro/Psych  (+) headaches, psychiatric history Anxiety and Depression  GI/Hepatic/Renal/Endo    (+) GERD    Musculoskeletal     (+) back pain  Abdominal    Substance History      OB/GYN          Other   arthritis,     ROS/Med Hx Other: PAT Nursing Notes unavailable.                   Anesthesia Plan    ASA 3     general     (Patient understands anesthesia not responsible for dental damage.)  intravenous induction     Anesthetic plan, risks, benefits, and alternatives have been provided, discussed and informed consent has been obtained with: patient.  Pre-procedure education provided    CODE STATUS:

## 2025-06-11 ENCOUNTER — TRANSITIONAL CARE MANAGEMENT TELEPHONE ENCOUNTER (OUTPATIENT)
Dept: CALL CENTER | Facility: HOSPITAL | Age: 40
End: 2025-06-11
Payer: COMMERCIAL

## 2025-06-11 NOTE — OUTREACH NOTE
Call Center TCM Note      Flowsheet Row Responses   [facility] patient discharged from? [facility]   Does the patient have one of the following disease processes/diagnoses(primary or secondary)? General Surgery   TCM attempt successful? Yes  [vr for Kahlil, ]   Call start time 1251   Call end time 1255   Discharge diagnosis Infected hernioplasty mesh, removal of umbilical hernia mesh and UMBILICAL HERNIA REPAIR with dissolvable mesh   Meds reviewed with patient/caregiver? Yes   Is the patient having any side effects they believe may be caused by any medication additions or changes? No   Does the patient have all medications related to this admission filled (includes all antibiotics, pain medications, etc.) Yes   Prescription comments pt has stopped norco while on percocet post op   Is the patient taking all medications as directed (includes completed medication regime)? Yes   Comments Surgical f/u on 6/25/25 at 200pm   Does the patient have an appointment with their PCP within 7-14 days of discharge? Other   Nursing Interventions Patient desires to follow up with specialty only, Routed TCM call to PCP office   Has home health visited the patient within 72 hours of discharge? N/A   Psychosocial issues? No   Did the patient receive a copy of their discharge instructions? Yes   Nursing interventions Reviewed instructions with patient   What is the patient's perception of their health status since discharge? Same  [Pt with expected surgical soreness, has been ambulatory and resting now.  No BM yet but mindful to watch for issues, pt is on pain meds chronically so hopeful this will not be a concern.]   Nursing interventions Nurse provided patient education  [post op instructions reviewed]   Is the patient /caregiver able to teach back basic post-op care? Lifting as instructed by MD in discharge instructions, Keep incision areas clean,dry and protected, No tub bath, swimming, or hot tub until instructed by MD,  Practice 'cough and deep breath'   Is the patient/caregiver able to teach back signs and symptoms of incisional infection? Increased redness, swelling or pain at the incisonal site, Increased drainage or bleeding, Incisional warmth, Pus or odor from incision, Fever  [reviewed]   Is the patient/caregiver able to teach back steps to recovery at home? Rest and rebuild strength, gradually increase activity   Is the patient/caregiver able to teach back the hierarchy of who to call/visit for symptoms/problems? PCP, Specialist, Home health nurse, Urgent Care, ED, 911 Yes   TCM call completed? Yes   Call end time 1255            CHRISTOPHER Xie Registered Nurse    6/11/2025, 12:58 EDT

## 2025-06-11 NOTE — OUTREACH NOTE
Prep Survey      Flowsheet Row Responses   [facility] patient discharged from? [facility]   Is LACE score < 7 ? Yes   Eligibility [facility]   Date of Admission 06/10/25   Date of Discharge 06/10/25   Discharge Disposition Home or Self Care   Discharge diagnosis Infected hernioplasty mesh, removal of umbilical hernia mesh and UMBILICAL HERNIA REPAIR with dissolvable mesh   Does the patient have one of the following disease processes/diagnoses(primary or secondary)? General Surgery   Does the patient have Home health ordered? No   Is there a DME ordered? No   Prep survey completed? Yes            Glory AMADOR - Registered Nurse

## 2025-06-25 ENCOUNTER — OFFICE VISIT (OUTPATIENT)
Dept: SURGERY | Facility: CLINIC | Age: 40
End: 2025-06-25
Payer: COMMERCIAL

## 2025-06-25 VITALS — BODY MASS INDEX: 29.41 KG/M2 | WEIGHT: 172.3 LBS | HEIGHT: 64 IN

## 2025-06-25 DIAGNOSIS — Z98.890 POST-OPERATIVE STATE: Primary | ICD-10-CM

## 2025-06-25 RX ORDER — HYDROCODONE BITARTRATE AND ACETAMINOPHEN 7.5; 325 MG/1; MG/1
1 TABLET ORAL EVERY 6 HOURS PRN
COMMUNITY
Start: 2025-06-16

## 2025-06-25 NOTE — PROGRESS NOTES
"Chief Complaint  Post-op Follow-up (UMBILICAL HERNIA REPAIR. PATIENT COMPLAINS OF KNOT ABOVE INCISION SITE, REDNESS, AND HOT TO TOUCH.)    Subjective    Subjective     Crystal Mast is a 40 y.o. female who presents to [facility] GENERAL SURGERY    History of Present Illness  40-year-old female who previously underwent open umbilical hernia repair complicated by mesh infection.  Approximately 2 weeks ago she underwent explantation of the permanent mesh and replacement with biologic mesh.  Several days after surgery she began to develop some redness above her umbilicus which she says is  to touch.  She denies any systemic symptoms and has no other complaints at this time.  Post-op Follow-up      Personal History     Social History     Tobacco Use    Smoking status: Every Day     Current packs/day: 1.00     Average packs/day: 1 pack/day for 22.7 years (22.7 ttl pk-yrs)     Types: Cigarettes     Start date: 10/25/2002     Passive exposure: Current    Smokeless tobacco: Never    Tobacco comments:     LAST SMOKED 6/10 0800   Vaping Use    Vaping status: Never Used   Substance Use Topics    Alcohol use: Never    Drug use: Never     No Known Allergies    Objective    Objective   Vital Signs:   Ht 162.6 cm (64\")   Wt 78.2 kg (172 lb 4.8 oz)   BMI 29.58 kg/m²       Physical Exam  Constitutional:       Appearance: Normal appearance.   HENT:      Head: Normocephalic and atraumatic.      Mouth/Throat:      Mouth: Mucous membranes are moist.      Pharynx: Oropharynx is clear.   Cardiovascular:      Rate and Rhythm: Normal rate and regular rhythm.   Pulmonary:      Effort: Pulmonary effort is normal. No respiratory distress.   Abdominal:      General: There is no distension.      Palpations: Abdomen is soft.      Tenderness: There is no abdominal tenderness.      Comments: Periumbilical incision healing well, overall her erythema is improved postoperatively   Musculoskeletal:         General: No " swelling. Normal range of motion.      Cervical back: Normal range of motion and neck supple.   Skin:     General: Skin is warm and dry.   Neurological:      General: No focal deficit present.      Mental Status: She is alert and oriented to person, place, and time.   Psychiatric:         Mood and Affect: Mood normal.         Behavior: Behavior normal.                  Assessment / Plan      Diagnoses and all orders for this visit:    1. Post-operative state (Primary)    40-year-old female status post mesh explantation and umbilical hernia repair with underlay biologic mesh.  Erythema improved from initial postoperative state.  Will continue to monitor at this time and plan for follow-up with me in 2 weeks for repeat wound check.    Follow Up   Return in about 2 weeks (around 7/9/2025).      Patient was given instructions and counseling regarding her condition or for health maintenance advice. Please see specific information pulled into the AVS if appropriate.     Electronically signed by Vaughn Myers MD, 06/25/25, 2:10 PM EDT.

## 2025-07-01 ENCOUNTER — TELEPHONE (OUTPATIENT)
Dept: SURGERY | Facility: CLINIC | Age: 40
End: 2025-07-01
Payer: COMMERCIAL

## 2025-07-01 DIAGNOSIS — Z98.890 POST-OPERATIVE STATE: Primary | ICD-10-CM

## 2025-07-01 RX ORDER — SULFAMETHOXAZOLE AND TRIMETHOPRIM 800; 160 MG/1; MG/1
1 TABLET ORAL 2 TIMES DAILY
Qty: 20 TABLET | Refills: 0 | Status: SHIPPED | OUTPATIENT
Start: 2025-07-01

## 2025-07-01 NOTE — TELEPHONE ENCOUNTER
Spoke with the patient and relayed Dr. Myers's message. Patient was also advised to call the office back with any other questions or concerns. Patient v/u

## 2025-07-01 NOTE — TELEPHONE ENCOUNTER
PATIENT CALLED.  SHE SAID AROUND HER INCISION IS INFECTED.  IT HAS GOTTEN WORSE.  IT IS RED, HOT, SWOLLEN, AND INFLAMED.  IT HAS GONE DOWN BELOW HER BELLY BUTTON.  IT IS BURNING HER FROM THE INSIDE, LIKE A TORCH BURNING.    SHE ASKED FOR AN ANTIBIOTIC TO BE SENT TO [facility] [place].    #[phone]

## 2025-07-10 ENCOUNTER — TELEPHONE (OUTPATIENT)
Dept: FAMILY MEDICINE CLINIC | Facility: CLINIC | Age: 40
End: 2025-07-10

## 2025-07-17 ENCOUNTER — TELEPHONE (OUTPATIENT)
Dept: SURGERY | Facility: CLINIC | Age: 40
End: 2025-07-17
Payer: COMMERCIAL

## 2025-07-17 NOTE — TELEPHONE ENCOUNTER
CALLED ALL NUMBERS IN PT CHART TO RS NO SHOW APPT FROM 7/7 CHERYL/SARATH/MICHAEL/MAILED NO SHOW LETTER/ANYTHING ELSE TO DO??

## 2025-07-25 ENCOUNTER — OFFICE VISIT (OUTPATIENT)
Dept: SURGERY | Facility: CLINIC | Age: 40
End: 2025-07-25
Payer: COMMERCIAL

## 2025-07-25 VITALS — BODY MASS INDEX: 29.09 KG/M2 | HEIGHT: 64 IN | WEIGHT: 170.4 LBS | RESPIRATION RATE: 16 BRPM

## 2025-07-25 DIAGNOSIS — Z98.890 POST-OPERATIVE STATE: Primary | ICD-10-CM

## 2025-07-25 NOTE — PROGRESS NOTES
"Chief Complaint  Follow-up (2 MONTH)    Subjective    Subjective     Crystal Mast is a 40 y.o. female who presents to [facility] GENERAL SURGERY    History of Present Illness  40-year-old female who presents today for follow-up after undergoing explantation of umbilical hernia permanent mesh and replacement with biologic mesh.  Since last evaluated the office she completed a course of oral antibiotics and had some drainage from her incision.  She denies any further drainage.  She states her skin has healed and she no longer has any redness.  She also denies any nausea, vomiting, fevers, chills.  She has some expected soreness in the area still, but nothing like it was.  She is otherwise tolerating a regular diet and having normal bowel movements.      Personal History     Social History     Tobacco Use    Smoking status: Every Day     Current packs/day: 1.00     Average packs/day: 1 pack/day for 22.7 years (22.7 ttl pk-yrs)     Types: Cigarettes     Start date: 10/25/2002     Passive exposure: Current    Smokeless tobacco: Never    Tobacco comments:     LAST SMOKED 6/10 0800   Vaping Use    Vaping status: Never Used   Substance Use Topics    Alcohol use: Never    Drug use: Never     No Known Allergies    Objective    Objective   Vital Signs:   Resp 16   Ht 162.6 cm (64\")   Wt 77.3 kg (170 lb 6.4 oz)   BMI 29.25 kg/m²       Physical Exam  Constitutional:       Appearance: Normal appearance.   HENT:      Head: Normocephalic and atraumatic.      Mouth/Throat:      Mouth: Mucous membranes are moist.      Pharynx: Oropharynx is clear.   Cardiovascular:      Rate and Rhythm: Normal rate and regular rhythm.   Pulmonary:      Effort: Pulmonary effort is normal. No respiratory distress.   Abdominal:      General: There is no distension.      Palpations: Abdomen is soft.      Tenderness: There is no abdominal tenderness.      Comments: Periumbilical incision healing well without evidence of infection "   Musculoskeletal:         General: No swelling. Normal range of motion.      Cervical back: Normal range of motion and neck supple.   Skin:     General: Skin is warm and dry.   Neurological:      General: No focal deficit present.      Mental Status: She is alert and oriented to person, place, and time.   Psychiatric:         Mood and Affect: Mood normal.         Behavior: Behavior normal.                  Assessment / Plan      Diagnoses and all orders for this visit:    1. Post-operative state (Primary)    40-year-old female status post umbilical hernia repair with biologic mesh.  No evidence of infection on today's exam.  No palpable hernia.  Doing well and work note provided.  She may otherwise follow-up with me again in clinic as needed.    Follow Up   Return if symptoms worsen or fail to improve.      Patient was given instructions and counseling regarding her condition or for health maintenance advice. Please see specific information pulled into the AVS if appropriate.     Electronically signed by Vaughn Myers MD, 07/25/25, 3:24 PM EDT.

## 2025-08-13 ENCOUNTER — TRANSCRIBE ORDERS (OUTPATIENT)
Dept: ADMINISTRATIVE | Facility: HOSPITAL | Age: 40
End: 2025-08-13
Payer: COMMERCIAL

## 2025-08-13 ENCOUNTER — HOSPITAL ENCOUNTER (OUTPATIENT)
Dept: GENERAL RADIOLOGY | Facility: HOSPITAL | Age: 40
Discharge: HOME | End: 2025-08-13
Admitting: NURSE PRACTITIONER
Payer: COMMERCIAL

## 2025-08-13 DIAGNOSIS — M54.50 LOW BACK PAIN, UNSPECIFIED BACK PAIN LATERALITY, UNSPECIFIED CHRONICITY, UNSPECIFIED WHETHER SCIATICA PRESENT: Primary | ICD-10-CM

## 2025-08-13 DIAGNOSIS — M54.50 LOW BACK PAIN, UNSPECIFIED BACK PAIN LATERALITY, UNSPECIFIED CHRONICITY, UNSPECIFIED WHETHER SCIATICA PRESENT: ICD-10-CM

## 2025-08-13 PROCEDURE — 72110 X-RAY EXAM L-2 SPINE 4/>VWS: CPT

## (undated) DEVICE — INTENDED FOR TISSUE SEPARATION, AND OTHER PROCEDURES THAT REQUIRE A SHARP SURGICAL BLADE TO PUNCTURE OR CUT.: Brand: BARD-PARKER ® CARBON RIB-BACK BLADES

## (undated) DEVICE — SOL IRR NACL 0.9PCT BO 1000ML

## (undated) DEVICE — ELECTRD BLD EDGE/INSUL1P 2.4X5.1MM STRL

## (undated) DEVICE — SOLIDIFIER LIQLOC PLS 1500CC BT

## (undated) DEVICE — GENERAL LAPAROSCOPY-LF: Brand: MEDLINE INDUSTRIES, INC.

## (undated) DEVICE — TIP COVER ACCESSORY

## (undated) DEVICE — SUT PDS 0 CT-1 Z340H

## (undated) DEVICE — SYR LUERLOK 30CC

## (undated) DEVICE — SUT PDS2 0 CT1 27IN Z340H MF VIL

## (undated) DEVICE — GOWN,REINFRCE,POLY,SIRUS,BREATH SLV,XXLG: Brand: MEDLINE

## (undated) DEVICE — ARM DRAPE

## (undated) DEVICE — STERILE POLYISOPRENE POWDER-FREE SURGICAL GLOVES: Brand: PROTEXIS

## (undated) DEVICE — MAJOR-LF: Brand: MEDLINE INDUSTRIES, INC.

## (undated) DEVICE — ELECTRD BLD EZ CLN MOD XLNG 2.75IN

## (undated) DEVICE — TISSUE RETRIEVAL SYSTEM: Brand: INZII RETRIEVAL SYSTEM

## (undated) DEVICE — LAPAROSCOPIC SMOKE EVACUATION SYSTEM ACTIVE AND PASSIVE: Brand: VALLEYLAB

## (undated) DEVICE — Device: Brand: DEFENDO AIR/WATER/SUCTION AND BIOPSY VALVE

## (undated) DEVICE — SUT PDS 1 CT1 36IN Z347H

## (undated) DEVICE — DAVINCI-LF: Brand: MEDLINE INDUSTRIES, INC.

## (undated) DEVICE — TROCARS: Brand: KII® BALLOON BLUNT TIP SYSTEM

## (undated) DEVICE — PENCL ES MEGADINE EZ/CLEAN BUTN W/HOLSTR 10FT

## (undated) DEVICE — GLV SURG SENSICARE PI ORTHO SZ8 LF STRL

## (undated) DEVICE — BLCK/BITE BLOX WO/DENTL/RIM W/STRAP 54F

## (undated) DEVICE — ABSORBABLE WOUND CLOSURE DEVICE
Type: IMPLANTABLE DEVICE | Site: ABDOMEN | Status: NON-FUNCTIONAL
Brand: V-LOC 180

## (undated) DEVICE — LINER SURG CANSTR SXN S/RIGD 1500CC

## (undated) DEVICE — SYR LL TP 10ML STRL

## (undated) DEVICE — 40583 XL ADVANCED TRENDELENBURG POSITIONING KIT: Brand: 40583 XL ADVANCED TRENDELENBURG POSITIONING KIT

## (undated) DEVICE — STERILE POLYISOPRENE POWDER-FREE SURGICAL GLOVES WITH EMOLLIENT COATING: Brand: PROTEXIS

## (undated) DEVICE — SLV SCD KN/LEN ADJ EXPRSS BLENDED MD 1P/U

## (undated) DEVICE — LAPAROVUE VISIBILITY SYSTEM LAPAROSCOPIC SOLUTIONS: Brand: LAPAROVUE

## (undated) DEVICE — SEAL

## (undated) DEVICE — Device

## (undated) DEVICE — ANTIBACTERIAL UNDYED BRAIDED (POLYGLACTIN 910), SYNTHETIC ABSORBABLE SUTURE: Brand: COATED VICRYL

## (undated) DEVICE — SINGLE-USE BIOPSY FORCEPS: Brand: RADIAL JAW 4

## (undated) DEVICE — 450 ML BOTTLE OF 0.05% CHLORHEXIDINE GLUCONATE IN 99.95% STERILE WATER FOR IRRIGATION, USP AND APPLICATOR.: Brand: IRRISEPT ANTIMICROBIAL WOUND LAVAGE

## (undated) DEVICE — SHEET,DRAPE,70X85,STERILE: Brand: MEDLINE

## (undated) DEVICE — ENDOPATH XCEL BLADELESS TROCARS WITH STABILITY SLEEVES: Brand: ENDOPATH XCEL

## (undated) DEVICE — ADHS SKIN SURG TISS VISC PREMIERPRO EXOFIN HI/VISC 1ML

## (undated) DEVICE — SUT MNCRYL PLS ANTIB UD 4/0 PS2 18IN

## (undated) DEVICE — 2, DISPOSABLE SUCTION/IRRIGATOR WITHOUT DISPOSABLE TIP: Brand: STRYKEFLOW

## (undated) DEVICE — ENDOPATH XCEL WITH OPTIVIEW TECHNOLOGY BLADELESS TROCARS WITH STABILITY SLEEVES: Brand: ENDOPATH XCEL OPTIVIEW

## (undated) DEVICE — ADHS SKIN SURG TISS VISC PREMIERPRO EXOFIN HI/VISC FAST/DRY

## (undated) DEVICE — SOL IRRG H2O PL/BG 1000ML STRL

## (undated) DEVICE — GLV SURG SENSICARE PI ORTHO SZ6.5 LF STRL

## (undated) DEVICE — SUT VIC 3/0 SH 27IN J416H

## (undated) DEVICE — GOWN,SIRUS,POLYRNF,BRTHSLV,2XL,18/CS: Brand: MEDLINE

## (undated) DEVICE — PENCL SMOKE/EVAC MEGADYNE TELESCP 10FT

## (undated) DEVICE — THE STERILE LIGHT HANDLE COVER IS USED WITH STERIS SURGICAL LIGHTING AND VISUALIZATION SYSTEMS.

## (undated) DEVICE — SOL IRR NACL 0.9PCT BT 1000ML

## (undated) DEVICE — CONN JET HYDRA H20 AUXILIARY DISP

## (undated) DEVICE — SOL IRR NACL 0.9PCT 1000ML

## (undated) DEVICE — GLV SURG SENSICARE PI ORTHO PF SZ7 LF STRL

## (undated) DEVICE — ENDOPATH XCEL WITH OPTIVIEW TECHNOLOGY UNIVERSAL TROCAR STABILITY SLEEVES: Brand: ENDOPATH XCEL OPTIVIEW

## (undated) DEVICE — ANTIBACTERIAL VIOLET BRAIDED (POLYGLACTIN 910), SYNTHETIC ABSORBABLE SUTURE: Brand: COATED VICRYL

## (undated) DEVICE — LAPAROSCOPIC SCISSORS: Brand: EPIX LAPAROSCOPIC SCISSORS

## (undated) DEVICE — ABDOMINAL BINDER, ILIO BYPASS: Brand: DEROYAL